# Patient Record
Sex: FEMALE | Race: WHITE | Employment: UNEMPLOYED | URBAN - METROPOLITAN AREA
[De-identification: names, ages, dates, MRNs, and addresses within clinical notes are randomized per-mention and may not be internally consistent; named-entity substitution may affect disease eponyms.]

---

## 2021-07-02 ENCOUNTER — TELEPHONE (OUTPATIENT)
Dept: ORTHOPEDIC SURGERY | Age: 61
End: 2021-07-02

## 2021-07-07 ENCOUNTER — OFFICE VISIT (OUTPATIENT)
Dept: ORTHOPEDIC SURGERY | Age: 61
End: 2021-07-07
Payer: MEDICAID

## 2021-07-07 DIAGNOSIS — M25.561 RIGHT KNEE PAIN, UNSPECIFIED CHRONICITY: Primary | ICD-10-CM

## 2021-07-07 DIAGNOSIS — M25.562 LEFT KNEE PAIN, UNSPECIFIED CHRONICITY: ICD-10-CM

## 2021-07-07 PROCEDURE — G8428 CUR MEDS NOT DOCUMENT: HCPCS | Performed by: ORTHOPAEDIC SURGERY

## 2021-07-07 PROCEDURE — 99203 OFFICE O/P NEW LOW 30 MIN: CPT | Performed by: ORTHOPAEDIC SURGERY

## 2021-07-07 PROCEDURE — G8421 BMI NOT CALCULATED: HCPCS | Performed by: ORTHOPAEDIC SURGERY

## 2021-07-07 PROCEDURE — 3017F COLORECTAL CA SCREEN DOC REV: CPT | Performed by: ORTHOPAEDIC SURGERY

## 2021-07-07 PROCEDURE — 4004F PT TOBACCO SCREEN RCVD TLK: CPT | Performed by: ORTHOPAEDIC SURGERY

## 2021-07-07 NOTE — PROGRESS NOTES
Tamara Jesus M.D.            [address] [place] [address]., [facility], [id number] [place] [address]           Dept Phone: [phone]           Dept Fax:  [phone]           Judy Haile          Dept Phone: [phone]           Dept Fax:  [phone]      Chief Compliant:  Chief Complaint   Patient presents with    Pain     BILATERAL KNEES        History of Present Illness: This is a 64 y.o. female who presents to the clinic today for evaluation / follow up of bilateral knee pain which is severe. Patient is a 49-year-old female who is been seen for knee pain many years ago at outlying facility. She resides in [place]. She has had injections in the past and really not helped out she states that she is tired of living this way. She can barely ambulate. 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 .       Review of Systems   Constitutional: Negative for fever, chills, sweats. Eyes: Negative for changes in vision, or pain. HENT: Negative for ear ache, epistaxis, or sore throat. Respiratory/Cardio: Negative for Chest pain, palpitations, SOB, or cough. Gastrointestinal: Negative for abdominal pain, N/V/D. Genitourinary: Negative for dysuria, frequency, urgency, or hematuria. Neurological: Negative for headache, numbness, or weakness. Integumentary: Negative for rash, itching, laceration, or abrasion. Musculoskeletal: Positive for Pain (BILATERAL KNEES)       Physical Exam:  Constitutional: Patient is oriented to person, place, and time. Patient appears well-developed and well nourished. HENT: Negative otherwise noted  Head: Normocephalic and Atraumatic  Nose: Normal  Eyes: Conjunctivae and EOM are normal  Neck: Normal range of motion Neck supple. Respiratory/Cardio: Effort normal. No respiratory distress.   Musculoskeletal: require preoperative clearance per her primary care physician    Patient was also made aware that bilaterals are typically in a longer recovery time but she wishes to get this all done 1 time she may require rehab facility post discharge and she is aware of this as well. She wishes to schedule for bilateral knee replacements pending medical clearance  Provider Attestation:  I, Sharon Kennedy, personally performed the services described in this documentation. All medical record entries made by the scribe were at my direction and in my presence. I have reviewed the chart and discharge instructions and agree that the records reflect my personal performance and is accurate and complete. Sharon Kennedy MD. 07/07/21      Please note that this chart was generated using voice recognition Dragon dictation software. Although every effort was made to ensure the accuracy of this automated transcription, some errors in transcription may have occurred.

## 2021-08-10 ENCOUNTER — TELEPHONE (OUTPATIENT)
Dept: ORTHOPEDIC SURGERY | Age: 61
End: 2021-08-10

## 2021-08-10 DIAGNOSIS — M25.561 ACUTE PAIN OF BOTH KNEES: Primary | ICD-10-CM

## 2021-08-10 DIAGNOSIS — M25.562 ACUTE PAIN OF BOTH KNEES: Primary | ICD-10-CM

## 2021-08-10 RX ORDER — LISINOPRIL 20 MG/1
20 TABLET ORAL DAILY
COMMUNITY

## 2021-08-10 NOTE — TELEPHONE ENCOUNTER
Called patient and left voice message for her to check with her insurance company to see if this a covered benefit or not and what DME location is approved for her. Are you willing to give patient an order for a ramp to go over her 4 steps for after surgery?

## 2021-08-11 ENCOUNTER — HOSPITAL ENCOUNTER (OUTPATIENT)
Dept: PREADMISSION TESTING | Age: 61
Discharge: HOME | End: 2021-08-15
Payer: MEDICAID

## 2021-08-11 VITALS
DIASTOLIC BLOOD PRESSURE: 112 MMHG | WEIGHT: 245 LBS | RESPIRATION RATE: 16 BRPM | SYSTOLIC BLOOD PRESSURE: 194 MMHG | HEART RATE: 66 BPM | TEMPERATURE: 97.6 F | BODY MASS INDEX: 45.08 KG/M2 | OXYGEN SATURATION: 97 % | HEIGHT: 62 IN

## 2021-08-11 LAB
ABSOLUTE EOS #: 0.1 K/UL (ref 0–0.4)
ABSOLUTE IMMATURE GRANULOCYTE: ABNORMAL K/UL (ref 0–0.3)
ABSOLUTE LYMPH #: 2.7 K/UL (ref 1–4.8)
ABSOLUTE MONO #: 0.6 K/UL (ref 0.1–1.3)
ANION GAP SERPL CALCULATED.3IONS-SCNC: 12 MMOL/L (ref 9–17)
BASOPHILS # BLD: 1 % (ref 0–2)
BASOPHILS ABSOLUTE: 0.1 K/UL (ref 0–0.2)
BILIRUBIN URINE: NEGATIVE
BUN BLDV-MCNC: 7 MG/DL (ref 8–23)
BUN/CREAT BLD: ABNORMAL (ref 9–20)
CALCIUM SERPL-MCNC: 9.1 MG/DL (ref 8.6–10.4)
CHLORIDE BLD-SCNC: 105 MMOL/L (ref 98–107)
CO2: 23 MMOL/L (ref 20–31)
COLOR: YELLOW
COMMENT UA: NORMAL
CREAT SERPL-MCNC: 0.62 MG/DL (ref 0.5–0.9)
DIFFERENTIAL TYPE: ABNORMAL
EOSINOPHILS RELATIVE PERCENT: 2 % (ref 0–4)
GFR AFRICAN AMERICAN: >60 ML/MIN
GFR NON-AFRICAN AMERICAN: >60 ML/MIN
GFR SERPL CREATININE-BSD FRML MDRD: ABNORMAL ML/MIN/{1.73_M2}
GFR SERPL CREATININE-BSD FRML MDRD: ABNORMAL ML/MIN/{1.73_M2}
GLUCOSE BLD-MCNC: 93 MG/DL (ref 70–99)
GLUCOSE URINE: NEGATIVE
HCT VFR BLD CALC: 50.9 % (ref 36–46)
HEMOGLOBIN: 17.1 G/DL (ref 12–16)
IMMATURE GRANULOCYTES: ABNORMAL %
KETONES, URINE: NEGATIVE
LEUKOCYTE ESTERASE, URINE: NEGATIVE
LYMPHOCYTES # BLD: 30 % (ref 24–44)
MCH RBC QN AUTO: 30.3 PG (ref 26–34)
MCHC RBC AUTO-ENTMCNC: 33.6 G/DL (ref 31–37)
MCV RBC AUTO: 90.2 FL (ref 80–100)
MONOCYTES # BLD: 7 % (ref 1–7)
NITRITE, URINE: NEGATIVE
NRBC AUTOMATED: ABNORMAL PER 100 WBC
PDW BLD-RTO: 13.7 % (ref 11.5–14.9)
PH UA: 6.5 (ref 5–8)
PLATELET # BLD: 252 K/UL (ref 150–450)
PLATELET ESTIMATE: ABNORMAL
PMV BLD AUTO: 7.9 FL (ref 6–12)
POTASSIUM SERPL-SCNC: 4.2 MMOL/L (ref 3.7–5.3)
PROTEIN UA: NEGATIVE
RBC # BLD: 5.65 M/UL (ref 4–5.2)
RBC # BLD: ABNORMAL 10*6/UL
SEG NEUTROPHILS: 60 % (ref 36–66)
SEGMENTED NEUTROPHILS ABSOLUTE COUNT: 5.4 K/UL (ref 1.3–9.1)
SODIUM BLD-SCNC: 140 MMOL/L (ref 135–144)
SPECIFIC GRAVITY UA: 1.01 (ref 1–1.03)
TURBIDITY: CLEAR
URINE HGB: NEGATIVE
UROBILINOGEN, URINE: NORMAL
WBC # BLD: 8.9 K/UL (ref 3.5–11)
WBC # BLD: ABNORMAL 10*3/UL

## 2021-08-11 PROCEDURE — 81003 URINALYSIS AUTO W/O SCOPE: CPT

## 2021-08-11 PROCEDURE — 36415 COLL VENOUS BLD VENIPUNCTURE: CPT

## 2021-08-11 PROCEDURE — 85025 COMPLETE CBC W/AUTO DIFF WBC: CPT

## 2021-08-11 PROCEDURE — 87641 MR-STAPH DNA AMP PROBE: CPT

## 2021-08-11 PROCEDURE — 80048 BASIC METABOLIC PNL TOTAL CA: CPT

## 2021-08-11 PROCEDURE — 93005 ELECTROCARDIOGRAM TRACING: CPT | Performed by: ANESTHESIOLOGY

## 2021-08-11 RX ORDER — SENNOSIDES 8.6 MG
650 CAPSULE ORAL EVERY 8 HOURS PRN
Status: ON HOLD | COMMUNITY
End: 2021-09-21

## 2021-08-11 ASSESSMENT — PAIN DESCRIPTION - ORIENTATION: ORIENTATION: RIGHT;LEFT

## 2021-08-11 ASSESSMENT — PAIN DESCRIPTION - LOCATION: LOCATION: KNEE

## 2021-08-11 ASSESSMENT — PAIN DESCRIPTION - DESCRIPTORS: DESCRIPTORS: ACHING

## 2021-08-11 ASSESSMENT — PAIN DESCRIPTION - PAIN TYPE: TYPE: CHRONIC PAIN

## 2021-08-11 ASSESSMENT — PAIN SCALES - GENERAL: PAINLEVEL_OUTOF10: 3

## 2021-08-11 NOTE — PROGRESS NOTES
Dr. Nguyễn Rojo, anesthesia, was contacted and informed of the patient's planned surgery, history, and unconfirmed abnormal EKG results from EKG done today in [facility]. Medical clearance required. Surgery scheduling will notify Dr. Lj Deng office who will be responsible for making sure the clearance is obtained and is in the chart for surgery. Blood pressure today 170/100 manually 194/112 with machine. Patient was instructed to call PCP and tell her BP's from today and encouraged to check BP at home and keep record for her PCP.

## 2021-08-11 NOTE — H&P
vision. Patient has hx of PONV. Patient had Labs and EKG done with sinus bradycardia  And possible venticular hypertrophy. She is not on any anticoagulants. She will require preoperative clearance per her primary care physician    XR KNEE LEFT (1-2 VIEWS) 7/7/2021  X-rays taken today reviewed by me show standing AP and lateral left knee.    Patient has severe varus gonarthrosis left knee with bone-on-bone   apposition and early tibial wear with humongous osteophytes medially and   laterally.  Patient also has severe patellofemoral arthrosis as well     XR KNEE RIGHT (1-2 VIEWS) 7/7/2021  X-rays taken today reviewed by me show standing AP of the right knee as   well as a lateral.  Patient has significant varus gonarthrosis with   approaching bone-on-bone disease medial compartment of the right knee with   acute spur formation both medially and laterally given overall varus   disposition.  Patient also has a a severe patellofemoral arthrosis as   Well. BP Readings from Last 3 Encounters:   08/11/21 (S) (!) 194/112     PAST MEDICAL HISTORY     Past Medical History:   Diagnosis Date    Anxiety     Arthritis     Depression     Hypertension     Knee pain     x10 years    PONV (postoperative nausea and vomiting)     Sleep apnea     no machine       SURGICAL HISTORY       Past Surgical History:   Procedure Laterality Date    EYE SURGERY Left 2018    had a tear in left eye- had laser surgery to repair    ROTATOR CUFF REPAIR Left     SKIN BIOPSY      negative    TUMOR REMOVAL      left foot    ULNAR TUNNEL RELEASE Right     pinched nerve- has a plate and 7 screws        FAMILY HISTORY     History reviewed. No pertinent family history.     SOCIAL HISTORY       Social History     Socioeconomic History    Marital status:      Spouse name: None    Number of children: None    Years of education: None    Highest education level: None   Occupational History    None   Tobacco Use    Smoking status: Current Every Day Smoker     Packs/day: 1.00    Smokeless tobacco: Never Used   Vaping Use    Vaping Use: Some days    Substances: Nicotine, Flavoring   Substance and Sexual Activity    Alcohol use: Not Currently    Drug use: Not Currently    Sexual activity: None   Other Topics Concern    None   Social History Narrative    None     Social Determinants of Health     Financial Resource Strain:     Difficulty of Paying Living Expenses:    Food Insecurity:     Worried About Running Out of Food in the Last Year:     Ran Out of Food in the Last Year:    Transportation Needs:     Lack of Transportation (Medical):  Lack of Transportation (Non-Medical):    Physical Activity:     Days of Exercise per Week:     Minutes of Exercise per Session:    Stress:     Feeling of Stress :    Social Connections:     Frequency of Communication with Friends and Family:     Frequency of Social Gatherings with Friends and Family:     Attends Adventist Services:     Active Member of Clubs or Organizations:     Attends Club or Organization Meetings:     Marital Status:    Intimate Partner Violence:     Fear of Current or Ex-Partner:     Emotionally Abused:     Physically Abused:     Sexually Abused:        REVIEW OF SYSTEMS      No Known Allergies    Current Outpatient Medications on File Prior to Encounter   Medication Sig Dispense Refill    acetaminophen (TYLENOL 8 HOUR ARTHRITIS PAIN) 650 MG extended release tablet Take 650 mg by mouth every 8 hours as needed for Pain      lisinopril (PRINIVIL;ZESTRIL) 20 MG tablet Take 20 mg by mouth daily        No current facility-administered medications on file prior to encounter. General health:  Fairly good. No fever or chills. Skin:  No itching, redness or rash. HEENT:  No headache, epistaxis or sore throat. Neck:  No pain, stiffness or masses.                  Cardiovascular/Respiratory system:  No chest pain, palpitation or shortness of breath. Gastrointestinal tract: No abdominal pain, Dysphagia, nausea, vomiting, diarrhea or constipation. Genitourinary:  No burning on micturition. No hesitancy, urgency, frequency or discoloration of urine. Locomotor:  See HPI. Neuropsychiatric:  No referable complaints. GENERAL PHYSICAL EXAM:     Vitals: BP (S) (!) 194/112 Comment: 170/100 manual  Pulse 66   Temp 97.6 °F (36.4 °C) (Infrared)   Resp 16   Ht 5' 2\" (1.575 m)   Wt 245 lb (111.1 kg)   SpO2 97%   BMI 44.81 kg/m²  Body mass index is 44.81 kg/m². GENERAL APPEARANCE:   Kurtis Meza is 64 y.o.,  female, severely obese, nourished, conscious, alert. Does not appear to be distress or pain at this time. SKIN:  Warm, dry, no cyanosis or jaundice. HEAD:  Normocephalic, atraumatic, no swelling or tenderness. EYES:  Pupils equal, reactive to light. EARS:  No discharge, no marked hearing loss. NOSE:  No rhinorrhea, epistaxis or septal deformity. THROAT:  Not congested. No ulceration bleeding or discharge. NECK:  No stiffness, trachea central.                  CHEST:  Symmetrical and equal on expansion. HEART:  RRR S1 > S2. No audible murmurs or gallops. LUNGS:  Equal on expansion, normal breath sounds. No adventitious sounds. ABDOMEN:  Severely obese. Soft on palpation. No localized tenderness. No guarding or rigidity. LYMPHATICS:  No palpable cervical lymphadenopathy. LOCOMOTOR, BACK AND SPINE:  No tenderness or deformities. EXTREMITIES:  Symmetrical,  mild pretibial edema. No calf tenderness. No discoloration or ulcerations. Tenderness on palpation of Tj Knee  Knee joint space. TJ legs weak with SLRs.      NEUROLOGIC:  The patient is conscious, alert, oriented,Cranial nerve II-XII intact, taste and smell were not examined. No apparent focal sensory or motor deficits. PROVISIONAL DIAGNOSES / SURGERY:      KNEE TOTAL ARTHROPLASTY BILATERAL    RIGHT AND LEFT KNEE PAIN       There are no problems to display for this patient.           FERNANDO PADGETT, CATHY - CNP on 8/11/2021 at 1:13 PM

## 2021-08-12 LAB
EKG ATRIAL RATE: 59 BPM
EKG P AXIS: 63 DEGREES
EKG P-R INTERVAL: 142 MS
EKG Q-T INTERVAL: 444 MS
EKG QRS DURATION: 98 MS
EKG QTC CALCULATION (BAZETT): 439 MS
EKG R AXIS: -10 DEGREES
EKG T AXIS: 30 DEGREES
EKG VENTRICULAR RATE: 59 BPM
MRSA, DNA, NASAL: NORMAL
SPECIMEN DESCRIPTION: NORMAL

## 2021-08-12 PROCEDURE — 93010 ELECTROCARDIOGRAM REPORT: CPT | Performed by: INTERNAL MEDICINE

## 2021-08-13 NOTE — TELEPHONE ENCOUNTER
Placed order and printed for patient to . I called patient and left voice message for her that the order is here for  or she can call with fax number and we can fax it.

## 2021-08-16 ENCOUNTER — TELEPHONE (OUTPATIENT)
Dept: ORTHOPEDIC SURGERY | Age: 61
End: 2021-08-16

## 2021-08-16 NOTE — TELEPHONE ENCOUNTER
The office of Rosemarie Desai, PT's PCP is calling, saying that PT can not be cleared for Surgery due to Elevated BP (180/100). PT is scheduled for an Echo Thursday of this week.

## 2021-09-14 ENCOUNTER — HOSPITAL ENCOUNTER (OUTPATIENT)
Dept: PREADMISSION TESTING | Age: 61
Discharge: HOME | End: 2021-09-18
Payer: MEDICAID

## 2021-09-14 VITALS
TEMPERATURE: 97.8 F | BODY MASS INDEX: 44.16 KG/M2 | OXYGEN SATURATION: 97 % | DIASTOLIC BLOOD PRESSURE: 84 MMHG | RESPIRATION RATE: 16 BRPM | HEART RATE: 70 BPM | WEIGHT: 240 LBS | SYSTOLIC BLOOD PRESSURE: 140 MMHG | HEIGHT: 62 IN

## 2021-09-14 LAB
ABSOLUTE EOS #: 0.1 K/UL (ref 0–0.4)
ABSOLUTE IMMATURE GRANULOCYTE: ABNORMAL K/UL (ref 0–0.3)
ABSOLUTE LYMPH #: 2.7 K/UL (ref 1–4.8)
ABSOLUTE MONO #: 0.6 K/UL (ref 0.1–1.3)
ANION GAP SERPL CALCULATED.3IONS-SCNC: 11 MMOL/L (ref 9–17)
BASOPHILS # BLD: 1 % (ref 0–2)
BASOPHILS ABSOLUTE: 0.1 K/UL (ref 0–0.2)
BILIRUBIN URINE: NEGATIVE
BUN BLDV-MCNC: 12 MG/DL (ref 8–23)
BUN/CREAT BLD: NORMAL (ref 9–20)
CALCIUM SERPL-MCNC: 9.5 MG/DL (ref 8.6–10.4)
CHLORIDE BLD-SCNC: 101 MMOL/L (ref 98–107)
CO2: 24 MMOL/L (ref 20–31)
COLOR: YELLOW
COMMENT UA: NORMAL
CREAT SERPL-MCNC: 0.57 MG/DL (ref 0.5–0.9)
DIFFERENTIAL TYPE: ABNORMAL
EOSINOPHILS RELATIVE PERCENT: 1 % (ref 0–4)
GFR AFRICAN AMERICAN: >60 ML/MIN
GFR NON-AFRICAN AMERICAN: >60 ML/MIN
GFR SERPL CREATININE-BSD FRML MDRD: NORMAL ML/MIN/{1.73_M2}
GFR SERPL CREATININE-BSD FRML MDRD: NORMAL ML/MIN/{1.73_M2}
GLUCOSE BLD-MCNC: 91 MG/DL (ref 70–99)
GLUCOSE URINE: NEGATIVE
HCT VFR BLD CALC: 51.2 % (ref 36–46)
HEMOGLOBIN: 17.1 G/DL (ref 12–16)
IMMATURE GRANULOCYTES: ABNORMAL %
KETONES, URINE: NEGATIVE
LEUKOCYTE ESTERASE, URINE: NEGATIVE
LYMPHOCYTES # BLD: 33 % (ref 24–44)
MCH RBC QN AUTO: 29.8 PG (ref 26–34)
MCHC RBC AUTO-ENTMCNC: 33.4 G/DL (ref 31–37)
MCV RBC AUTO: 89.3 FL (ref 80–100)
MONOCYTES # BLD: 7 % (ref 1–7)
NITRITE, URINE: NEGATIVE
NRBC AUTOMATED: ABNORMAL PER 100 WBC
PDW BLD-RTO: 13.5 % (ref 11.5–14.9)
PH UA: 6 (ref 5–8)
PLATELET # BLD: 278 K/UL (ref 150–450)
PLATELET ESTIMATE: ABNORMAL
PMV BLD AUTO: 8.1 FL (ref 6–12)
POTASSIUM SERPL-SCNC: 4.3 MMOL/L (ref 3.7–5.3)
PROTEIN UA: NEGATIVE
RBC # BLD: 5.73 M/UL (ref 4–5.2)
RBC # BLD: ABNORMAL 10*6/UL
SEG NEUTROPHILS: 58 % (ref 36–66)
SEGMENTED NEUTROPHILS ABSOLUTE COUNT: 4.8 K/UL (ref 1.3–9.1)
SODIUM BLD-SCNC: 136 MMOL/L (ref 135–144)
SPECIFIC GRAVITY UA: 1.02 (ref 1–1.03)
TURBIDITY: CLEAR
URINE HGB: NEGATIVE
UROBILINOGEN, URINE: NORMAL
WBC # BLD: 8.3 K/UL (ref 3.5–11)
WBC # BLD: ABNORMAL 10*3/UL

## 2021-09-14 PROCEDURE — 85025 COMPLETE CBC W/AUTO DIFF WBC: CPT

## 2021-09-14 PROCEDURE — 87641 MR-STAPH DNA AMP PROBE: CPT

## 2021-09-14 PROCEDURE — 36415 COLL VENOUS BLD VENIPUNCTURE: CPT

## 2021-09-14 PROCEDURE — 80048 BASIC METABOLIC PNL TOTAL CA: CPT

## 2021-09-14 PROCEDURE — 81003 URINALYSIS AUTO W/O SCOPE: CPT

## 2021-09-14 RX ORDER — AMLODIPINE BESYLATE 10 MG/1
10 TABLET ORAL DAILY
COMMUNITY

## 2021-09-14 ASSESSMENT — ENCOUNTER SYMPTOMS
RESPIRATORY NEGATIVE: 1
GASTROINTESTINAL NEGATIVE: 1

## 2021-09-14 NOTE — H&P
HISTORY and [facility] [address]       NAME:  Lata Haas  MRN: [id number]   YOB: 1960   Date: 9/14/2021   Age: 64 y.o. Gender: female     COMPLAINT AND PRESENT HISTORY:   Lata Haas is 64 y.o.,  female, presents for pre-anesthesia/admission testing for KNEE TOTAL ARTHROPLASTY BILATERAL Bilateral per Dr. Fidelina Smith       Primary dx: DEGENERATIVE JOINT DISEASE BILATERAL KNEES    HPI:  Patient C/O of dual/ aching  Pain with swelling, stiffness, popping and cracking  bilateral Knee. Pt describes the pain as 10 /10 in intensity at times. Symptoms started  greater than 10 years ago. The knee does buckle, and give way under her, No recent falls or trauma. No redness or rashes. 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 Pain aggravated by  climbing steps,  walking /standing for even short time. pain decreased by taking  Tylenol/ Motrin for pain relief. Pt states resting for long time results in stiffen then causing more pain getting up. Pt had cortisone injection once without any pain relief.    Pt denies fever/chills, chest pain or SOB, no palpitation or headache     Testing completed r/t condition:  XR KNEE LEFT (1-2 VIEWS)  Narrative   X-rays taken today reviewed by me show standing AP and lateral left knee.     Patient has severe varus gonarthrosis left knee with bone-on-bone    apposition and early tibial wear with humongous osteophytes medially and    laterally.  Patient also has severe patellofemoral arthrosis as well     XR KNEE RIGHT (1-2 VIEWS)  Narrative   X-rays taken today reviewed by me show standing AP of the right knee as    well as a lateral.  Patient has significant varus gonarthrosis with    approaching bone-on-bone disease medial compartment of the right knee with    acute spur formation both medially and laterally given overall varus    disposition.  Patient also has a a severe patellofemoral arthrosis as well.       Review of additional significant medical hx:  HTN, sleep apnea , no machine   Medication r/t condition   Norvasc 10 mg tablet, lisinopril 20 mg tablet     BP Readings from Last 3 Encounters:   09/14/21 (!) 140/84   08/11/21 (S) (!) 194/112     Pulse Readings from Last 3 Encounters:   09/14/21 70   08/11/21 66     ECG on 8/11/2021  Narrative & Impression    Sinus bradycardia  Possible Left atrial enlargement  Left ventricular hypertrophy  Abnormal ECG  No previous ECGs available        Echo complete W/O contrast 8/19/2021  Narrative      Left Ventricle: Systolic function is normal with an ejection fraction   of 55-60%.   Aortic Valve: There is no regurgitation. There is mild stenosis. The   calculated aortic valve area is 1.54 cm2. The calculated aortic valve peak   gradient is 18.00 mmHg. The calculated aortic valve mean gradient is 9.00   mmHg.   Right Ventricle: Right ventricular size appears normal. Systolic   function is normal.     Tricuspid Valve: The right ventricular systolic pressure normal. RVSP   calculated at 20 mmHg. RVSP is based on RA pressure of 3 mmHg. Denies hx of MRSA infection. Denies hx of blood clots. Pt has postoperative nausea and vomiting     Clearance requested per anesthesiologist:   Medical clearance requested. Surgery was cancelled Aug 2021 due to high blood pressure. Obtained echo results from 8/2021, placed in chart. Surgery scheduling will notify Dr. Wilfredo Matute office who will be responsible for making sure the clearance is obtained and is in the chart for surgery.   PAST MEDICAL HISTORY     Past Medical History:   Diagnosis Date    Anxiety     Arthritis     Depression     Hypertension     Knee pain     x10 years    Murmur     PONV (postoperative nausea and vomiting)     Sleep apnea     no machine       SURGICAL HISTORY       Past Surgical History:   Procedure Laterality Date    EYE SURGERY Left 2018    had a tear in left eye- had laser surgery to repair    ROTATOR CUFF REPAIR Left     SKIN BIOPSY      negative    TUMOR REMOVAL      left foot    ULNAR TUNNEL RELEASE Right     pinched nerve- has a plate and 7 screws        SOCIAL HISTORY       Social History     Socioeconomic History    Marital status:      Spouse name: None    Number of children: None    Years of education: None    Highest education level: None   Occupational History    None   Tobacco Use    Smoking status: Current Every Day Smoker     Packs/day: 1.00    Smokeless tobacco: Never Used   Vaping Use    Vaping Use: Some days    Substances: Nicotine, Flavoring   Substance and Sexual Activity    Alcohol use: Not Currently    Drug use: Not Currently    Sexual activity: None   Other Topics Concern    None   Social History Narrative    None     Social Determinants of Health     Financial Resource Strain:     Difficulty of Paying Living Expenses:    Food Insecurity:     Worried About Running Out of Food in the Last Year:     Ran Out of Food in the Last Year:    Transportation Needs:     Lack of Transportation (Medical):      Lack of Transportation (Non-Medical):    Physical Activity:     Days of Exercise per Week:     Minutes of Exercise per Session:    Stress:     Feeling of Stress :    Social Connections:     Frequency of Communication with Friends and Family:     Frequency of Social Gatherings with Friends and Family:     Attends Advent Services:     Active Member of Clubs or Organizations:     Attends Club or Organization Meetings:     Marital Status:    Intimate Partner Violence:     Fear of Current or Ex-Partner:     Emotionally Abused:     Physically Abused:     Sexually Abused:        REVIEW OF SYSTEMS    No Known Allergies    Current Outpatient Medications on File Prior to Encounter   Medication Sig Dispense Refill    amLODIPine (NORVASC) 10 MG tablet Take 10 mg by mouth daily      acetaminophen (TYLENOL 8 HOUR ARTHRITIS PAIN) 650 MG extended release tablet Take 650 mg by mouth every 8 hours as needed for Pain      lisinopril (PRINIVIL;ZESTRIL) 20 MG tablet Take 20 mg by mouth daily        No current facility-administered medications on file prior to encounter. Review of Systems   Constitutional: Positive for activity change and fatigue. HENT: Negative. Respiratory: Negative. Gastrointestinal: Negative. Genitourinary: Negative. Musculoskeletal:        Bilateral knee pain    Skin: Negative. Neurological: Negative. Hematological: Negative. Psychiatric/Behavioral: Negative. GENERAL PHYSICAL EXAM     Vitals: BP (!) 140/84   Pulse 70   Temp 97.8 °F (36.6 °C)   Resp 16   Ht 5' 2\" (1.575 m)   Wt 240 lb (108.9 kg)   SpO2 97%   BMI 43.90 kg/m²               Physical Exam  Constitutional:       Appearance: Normal appearance. HENT:      Head: Normocephalic. Right Ear: External ear normal.      Left Ear: External ear normal.      Nose: Nose normal.      Mouth/Throat:      Mouth: Mucous membranes are moist.   Eyes:      General:         Right eye: No discharge. Left eye: No discharge. Pupils: Pupils are equal, round, and reactive to light. Cardiovascular:      Rate and Rhythm: Normal rate and regular rhythm. Pulses: Normal pulses. Radial pulses are 2+ on the right side and 2+ on the left side. Dorsalis pedis pulses are 2+ on the right side and 2+ on the left side. Posterior tibial pulses are 2+ on the right side and 2+ on the left side. Heart sounds: Normal heart sounds. No murmur heard. No gallop. Pulmonary:      Effort: Pulmonary effort is normal.      Breath sounds: Normal breath sounds. No wheezing or rhonchi. Abdominal:      General: Bowel sounds are normal. There is no distension. Palpations: Abdomen is soft. Tenderness: There is no abdominal tenderness. There is no guarding.    Musculoskeletal:      Cervical back: Normal range of motion and neck supple. Right lower leg: No edema. Left lower leg: No edema. Comments: Bilateral knee limitate ROM  Tenderness on palpation of Tj Knee  Knee joint space. TJ legs weak with SLRs. Skin:     General: Skin is warm and dry. Findings: No bruising or erythema. Neurological:      General: No focal deficit present. Mental Status: She is alert and oriented to person, place, and time. Psychiatric:         Mood and Affect: Mood normal.         Behavior: Behavior normal.         LAB REVIEW     Lab Results   Component Value Date    WBC 8.9 08/11/2021    HGB 17.1 (H) 08/11/2021    HCT 50.9 (H) 08/11/2021    MCV 90.2 08/11/2021     08/11/2021     Lab Results   Component Value Date     08/11/2021    K 4.2 08/11/2021     08/11/2021    CO2 23 08/11/2021    BUN 7 08/11/2021    CREATININE 0.62 08/11/2021    GLUCOSE 93 08/11/2021    CALCIUM 9.1 08/11/2021          SURGERY / PROVISIONAL DIAGNOSES:      DEGENERATIVE JOINT DISEASE BILATERAL KNEES  KNEE TOTAL ARTHROPLASTY BILATERAL         There are no problems to display for this patient.               CATHY Bucio - CNP on 9/14/2021 at 12:19 PM

## 2021-09-14 NOTE — H&P (VIEW-ONLY)
HISTORY and [facility]       NAME:  Hannah Steele  MRN: [id number]   YOB: 1960   Date: 9/14/2021   Age: 64 y.o. Gender: female     COMPLAINT AND PRESENT HISTORY:   Hannah Steele is 64 y.o.,  female, presents for pre-anesthesia/admission testing for KNEE TOTAL ARTHROPLASTY BILATERAL Bilateral per Dr. Abel Weston       Primary dx: DEGENERATIVE JOINT DISEASE BILATERAL KNEES    HPI:  Patient C/O of dual/ aching  Pain with swelling, stiffness, popping and cracking  bilateral Knee. Pt describes the pain as 10 /10 in intensity at times. Symptoms started  greater than 10 years ago. The knee does buckle, and give way under her, No recent falls or trauma. No redness or rashes. 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 Pain aggravated by  climbing steps,  walking /standing for even short time. pain decreased by taking  Tylenol/ Motrin for pain relief. Pt states resting for long time results in stiffen then causing more pain getting up. Pt had cortisone injection once without any pain relief.    Pt denies fever/chills, chest pain or SOB, no palpitation or headache     Testing completed r/t condition:  XR KNEE LEFT (1-2 VIEWS)  Narrative   X-rays taken today reviewed by me show standing AP and lateral left knee.     Patient has severe varus gonarthrosis left knee with bone-on-bone    apposition and early tibial wear with humongous osteophytes medially and    laterally.  Patient also has severe patellofemoral arthrosis as well     XR KNEE RIGHT (1-2 VIEWS)  Narrative   X-rays taken today reviewed by me show standing AP of the right knee as    well as a lateral.  Patient has significant varus gonarthrosis with    approaching bone-on-bone disease medial compartment of the right knee with    acute spur formation both medially and laterally given overall varus    disposition.  Patient also has a a severe patellofemoral arthrosis as well.       Review of additional significant medical hx:  HTN, sleep apnea , no machine   Medication r/t condition   Norvasc 10 mg tablet, lisinopril 20 mg tablet     BP Readings from Last 3 Encounters:   09/14/21 (!) 140/84   08/11/21 (S) (!) 194/112     Pulse Readings from Last 3 Encounters:   09/14/21 70   08/11/21 66     ECG on 8/11/2021  Narrative & Impression    Sinus bradycardia  Possible Left atrial enlargement  Left ventricular hypertrophy  Abnormal ECG  No previous ECGs available        Echo complete W/O contrast 8/19/2021  Narrative      Left Ventricle: Systolic function is normal with an ejection fraction   of 55-60%.   Aortic Valve: There is no regurgitation. There is mild stenosis. The   calculated aortic valve area is 1.54 cm2. The calculated aortic valve peak   gradient is 18.00 mmHg. The calculated aortic valve mean gradient is 9.00   mmHg.   Right Ventricle: Right ventricular size appears normal. Systolic   function is normal.     Tricuspid Valve: The right ventricular systolic pressure normal. RVSP   calculated at 20 mmHg. RVSP is based on RA pressure of 3 mmHg. Denies hx of MRSA infection. Denies hx of blood clots. Pt has postoperative nausea and vomiting     Clearance requested per anesthesiologist:   Medical clearance requested. Surgery was cancelled Aug 2021 due to high blood pressure. Obtained echo results from 8/2021, placed in chart. Surgery scheduling will notify Dr. Maloney Snare office who will be responsible for making sure the clearance is obtained and is in the chart for surgery.   PAST MEDICAL HISTORY     Past Medical History:   Diagnosis Date    Anxiety     Arthritis     Depression     Hypertension     Knee pain     x10 years    Murmur     PONV (postoperative nausea and vomiting)     Sleep apnea     no machine       SURGICAL HISTORY       Past Surgical History:   Procedure Laterality Date    EYE SURGERY Left 2018    had a tear in left eye- had laser surgery to repair    ROTATOR CUFF REPAIR Left     SKIN BIOPSY      negative    TUMOR REMOVAL      left foot    ULNAR TUNNEL RELEASE Right     pinched nerve- has a plate and 7 screws        SOCIAL HISTORY       Social History     Socioeconomic History    Marital status:      Spouse name: None    Number of children: None    Years of education: None    Highest education level: None   Occupational History    None   Tobacco Use    Smoking status: Current Every Day Smoker     Packs/day: 1.00    Smokeless tobacco: Never Used   Vaping Use    Vaping Use: Some days    Substances: Nicotine, Flavoring   Substance and Sexual Activity    Alcohol use: Not Currently    Drug use: Not Currently    Sexual activity: None   Other Topics Concern    None   Social History Narrative    None     Social Determinants of Health     Financial Resource Strain:     Difficulty of Paying Living Expenses:    Food Insecurity:     Worried About Running Out of Food in the Last Year:     Ran Out of Food in the Last Year:    Transportation Needs:     Lack of Transportation (Medical):      Lack of Transportation (Non-Medical):    Physical Activity:     Days of Exercise per Week:     Minutes of Exercise per Session:    Stress:     Feeling of Stress :    Social Connections:     Frequency of Communication with Friends and Family:     Frequency of Social Gatherings with Friends and Family:     Attends Worship Services:     Active Member of Clubs or Organizations:     Attends Club or Organization Meetings:     Marital Status:    Intimate Partner Violence:     Fear of Current or Ex-Partner:     Emotionally Abused:     Physically Abused:     Sexually Abused:        REVIEW OF SYSTEMS    No Known Allergies    Current Outpatient Medications on File Prior to Encounter   Medication Sig Dispense Refill    amLODIPine (NORVASC) 10 MG tablet Take 10 mg by mouth daily      acetaminophen (TYLENOL 8 HOUR ARTHRITIS PAIN) 650 MG extended release tablet Take 650 mg by mouth every 8 hours as needed for Pain      lisinopril (PRINIVIL;ZESTRIL) 20 MG tablet Take 20 mg by mouth daily        No current facility-administered medications on file prior to encounter. Review of Systems   Constitutional: Positive for activity change and fatigue. HENT: Negative. Respiratory: Negative. Gastrointestinal: Negative. Genitourinary: Negative. Musculoskeletal:        Bilateral knee pain    Skin: Negative. Neurological: Negative. Hematological: Negative. Psychiatric/Behavioral: Negative. GENERAL PHYSICAL EXAM     Vitals: BP (!) 140/84   Pulse 70   Temp 97.8 °F (36.6 °C)   Resp 16   Ht 5' 2\" (1.575 m)   Wt 240 lb (108.9 kg)   SpO2 97%   BMI 43.90 kg/m²               Physical Exam  Constitutional:       Appearance: Normal appearance. HENT:      Head: Normocephalic. Right Ear: External ear normal.      Left Ear: External ear normal.      Nose: Nose normal.      Mouth/Throat:      Mouth: Mucous membranes are moist.   Eyes:      General:         Right eye: No discharge. Left eye: No discharge. Pupils: Pupils are equal, round, and reactive to light. Cardiovascular:      Rate and Rhythm: Normal rate and regular rhythm. Pulses: Normal pulses. Radial pulses are 2+ on the right side and 2+ on the left side. Dorsalis pedis pulses are 2+ on the right side and 2+ on the left side. Posterior tibial pulses are 2+ on the right side and 2+ on the left side. Heart sounds: Normal heart sounds. No murmur heard. No gallop. Pulmonary:      Effort: Pulmonary effort is normal.      Breath sounds: Normal breath sounds. No wheezing or rhonchi. Abdominal:      General: Bowel sounds are normal. There is no distension. Palpations: Abdomen is soft. Tenderness: There is no abdominal tenderness. There is no guarding.    Musculoskeletal:      Cervical back: Normal range of motion and neck supple. Right lower leg: No edema. Left lower leg: No edema. Comments: Bilateral knee limitate ROM  Tenderness on palpation of Tj Knee  Knee joint space. TJ legs weak with SLRs. Skin:     General: Skin is warm and dry. Findings: No bruising or erythema. Neurological:      General: No focal deficit present. Mental Status: She is alert and oriented to person, place, and time. Psychiatric:         Mood and Affect: Mood normal.         Behavior: Behavior normal.         LAB REVIEW     Lab Results   Component Value Date    WBC 8.9 08/11/2021    HGB 17.1 (H) 08/11/2021    HCT 50.9 (H) 08/11/2021    MCV 90.2 08/11/2021     08/11/2021     Lab Results   Component Value Date     08/11/2021    K 4.2 08/11/2021     08/11/2021    CO2 23 08/11/2021    BUN 7 08/11/2021    CREATININE 0.62 08/11/2021    GLUCOSE 93 08/11/2021    CALCIUM 9.1 08/11/2021          SURGERY / PROVISIONAL DIAGNOSES:      DEGENERATIVE JOINT DISEASE BILATERAL KNEES  KNEE TOTAL ARTHROPLASTY BILATERAL         There are no problems to display for this patient.               Cristofer Villanueva, CATHY - CNP on 9/14/2021 at 12:19 PM

## 2021-09-14 NOTE — PROGRESS NOTES
Medical clearance requested. Surgery was cancelled Aug 2021 due to high blood pressure. Obtained echo results from 8/2021, placed in chart. Surgery scheduling will notify Dr. Genevieve Cote office who will be responsible for making sure the clearance is obtained and is in the chart for surgery.

## 2021-09-15 LAB
MRSA, DNA, NASAL: NORMAL
SPECIMEN DESCRIPTION: NORMAL

## 2021-09-17 ENCOUNTER — HOSPITAL ENCOUNTER (OUTPATIENT)
Dept: LAB | Age: 61
Setting detail: SPECIMEN
Discharge: HOME | End: 2021-09-17
Payer: MEDICAID

## 2021-09-17 DIAGNOSIS — Z01.818 PRE-OP TESTING: Primary | ICD-10-CM

## 2021-09-17 PROCEDURE — U0003 INFECTIOUS AGENT DETECTION BY NUCLEIC ACID (DNA OR RNA); SEVERE ACUTE RESPIRATORY SYNDROME CORONAVIRUS 2 (SARS-COV-2) (CORONAVIRUS DISEASE [COVID-19]), AMPLIFIED PROBE TECHNIQUE, MAKING USE OF HIGH THROUGHPUT TECHNOLOGIES AS DESCRIBED BY CMS-2020-01-R: HCPCS

## 2021-09-17 PROCEDURE — U0005 INFEC AGEN DETEC AMPLI PROBE: HCPCS

## 2021-09-18 LAB
SARS-COV-2: NORMAL
SARS-COV-2: NOT DETECTED
SOURCE: NORMAL

## 2021-09-20 ENCOUNTER — ANESTHESIA EVENT (OUTPATIENT)
Dept: OPERATING ROOM | Age: 61
End: 2021-09-20
Payer: MEDICAID

## 2021-09-21 ENCOUNTER — ANESTHESIA (OUTPATIENT)
Dept: OPERATING ROOM | Age: 61
End: 2021-09-21
Payer: MEDICAID

## 2021-09-21 ENCOUNTER — APPOINTMENT (OUTPATIENT)
Dept: GENERAL RADIOLOGY | Age: 61
End: 2021-09-21
Attending: ORTHOPAEDIC SURGERY
Payer: MEDICAID

## 2021-09-21 ENCOUNTER — HOSPITAL ENCOUNTER (OUTPATIENT)
Age: 61
Setting detail: OBSERVATION
Discharge: NURSING, REHAB OR LONG-TERM CARE | End: 2021-09-24
Attending: ORTHOPAEDIC SURGERY | Admitting: ORTHOPAEDIC SURGERY
Payer: MEDICAID

## 2021-09-21 VITALS — SYSTOLIC BLOOD PRESSURE: 161 MMHG | DIASTOLIC BLOOD PRESSURE: 76 MMHG | OXYGEN SATURATION: 94 % | TEMPERATURE: 97.9 F

## 2021-09-21 DIAGNOSIS — M17.0 PRIMARY OSTEOARTHRITIS OF BOTH KNEES: Primary | ICD-10-CM

## 2021-09-21 LAB
ALLEN TEST: ABNORMAL
CARBOXYHEMOGLOBIN: 2.7 % (ref 0–5)
FIO2: ABNORMAL
HCO3 ARTERIAL: 26.7 MMOL/L (ref 22–26)
METHEMOGLOBIN: 0.7 % (ref 0–1.9)
MODE: ABNORMAL
NEGATIVE BASE EXCESS, ART: ABNORMAL MMOL/L (ref 0–2)
NOTIFICATION TIME: ABNORMAL
NOTIFICATION: ABNORMAL
O2 DEVICE/FLOW/%: ABNORMAL
O2 SAT, ARTERIAL: 93.6 % (ref 95–98)
OXYHEMOGLOBIN: ABNORMAL % (ref 95–98)
PATIENT TEMP: 37
PCO2 ARTERIAL: 54.6 MMHG (ref 35–45)
PCO2, ART, TEMP ADJ: ABNORMAL (ref 35–45)
PEEP/CPAP: ABNORMAL
PH ARTERIAL: 7.3 (ref 7.35–7.45)
PH, ART, TEMP ADJ: ABNORMAL (ref 7.35–7.45)
PO2 ARTERIAL: 91.4 MMHG (ref 80–100)
PO2, ART, TEMP ADJ: ABNORMAL MMHG (ref 80–100)
POSITIVE BASE EXCESS, ART: 0.2 MMOL/L (ref 0–2)
PSV: ABNORMAL
PT. POSITION: ABNORMAL
RESPIRATORY RATE: 16
SAMPLE SITE: ABNORMAL
SET RATE: ABNORMAL
TEXT FOR RESPIRATORY: ABNORMAL
TOTAL HB: ABNORMAL G/DL (ref 12–16)
TOTAL RATE: ABNORMAL
VT: ABNORMAL

## 2021-09-21 PROCEDURE — 2500000003 HC RX 250 WO HCPCS: Performed by: ORTHOPAEDIC SURGERY

## 2021-09-21 PROCEDURE — 64447 NJX AA&/STRD FEMORAL NRV IMG: CPT | Performed by: ANESTHESIOLOGY

## 2021-09-21 PROCEDURE — 6360000002 HC RX W HCPCS: Performed by: ANESTHESIOLOGY

## 2021-09-21 PROCEDURE — 2500000003 HC RX 250 WO HCPCS: Performed by: NURSE ANESTHETIST, CERTIFIED REGISTERED

## 2021-09-21 PROCEDURE — C1776 JOINT DEVICE (IMPLANTABLE): HCPCS | Performed by: ORTHOPAEDIC SURGERY

## 2021-09-21 PROCEDURE — 73560 X-RAY EXAM OF KNEE 1 OR 2: CPT

## 2021-09-21 PROCEDURE — G0378 HOSPITAL OBSERVATION PER HR: HCPCS

## 2021-09-21 PROCEDURE — 6360000002 HC RX W HCPCS: Performed by: NURSE ANESTHETIST, CERTIFIED REGISTERED

## 2021-09-21 PROCEDURE — 6370000000 HC RX 637 (ALT 250 FOR IP): Performed by: ORTHOPAEDIC SURGERY

## 2021-09-21 PROCEDURE — 6360000002 HC RX W HCPCS: Performed by: ORTHOPAEDIC SURGERY

## 2021-09-21 PROCEDURE — C9290 INJ, BUPIVACAINE LIPOSOME: HCPCS | Performed by: ANESTHESIOLOGY

## 2021-09-21 PROCEDURE — 2580000003 HC RX 258: Performed by: ORTHOPAEDIC SURGERY

## 2021-09-21 PROCEDURE — 3600000013 HC SURGERY LEVEL 3 ADDTL 15MIN: Performed by: ORTHOPAEDIC SURGERY

## 2021-09-21 PROCEDURE — 96365 THER/PROPH/DIAG IV INF INIT: CPT

## 2021-09-21 PROCEDURE — 3600000003 HC SURGERY LEVEL 3 BASE: Performed by: ORTHOPAEDIC SURGERY

## 2021-09-21 PROCEDURE — 94761 N-INVAS EAR/PLS OXIMETRY MLT: CPT

## 2021-09-21 PROCEDURE — 2709999900 HC NON-CHARGEABLE SUPPLY: Performed by: ORTHOPAEDIC SURGERY

## 2021-09-21 PROCEDURE — 2500000003 HC RX 250 WO HCPCS: Performed by: ANESTHESIOLOGY

## 2021-09-21 PROCEDURE — 27447 TOTAL KNEE ARTHROPLASTY: CPT | Performed by: ORTHOPAEDIC SURGERY

## 2021-09-21 PROCEDURE — 2580000003 HC RX 258: Performed by: ANESTHESIOLOGY

## 2021-09-21 PROCEDURE — 6370000000 HC RX 637 (ALT 250 FOR IP): Performed by: NURSE ANESTHETIST, CERTIFIED REGISTERED

## 2021-09-21 PROCEDURE — 36600 WITHDRAWAL OF ARTERIAL BLOOD: CPT

## 2021-09-21 PROCEDURE — 3700000000 HC ANESTHESIA ATTENDED CARE: Performed by: ORTHOPAEDIC SURGERY

## 2021-09-21 PROCEDURE — 7100000000 HC PACU RECOVERY - FIRST 15 MIN: Performed by: ORTHOPAEDIC SURGERY

## 2021-09-21 PROCEDURE — C1713 ANCHOR/SCREW BN/BN,TIS/BN: HCPCS | Performed by: ORTHOPAEDIC SURGERY

## 2021-09-21 PROCEDURE — 3700000001 HC ADD 15 MINUTES (ANESTHESIA): Performed by: ORTHOPAEDIC SURGERY

## 2021-09-21 PROCEDURE — 94660 CPAP INITIATION&MGMT: CPT

## 2021-09-21 PROCEDURE — 2720000010 HC SURG SUPPLY STERILE: Performed by: ORTHOPAEDIC SURGERY

## 2021-09-21 PROCEDURE — 82805 BLOOD GASES W/O2 SATURATION: CPT

## 2021-09-21 PROCEDURE — 7100000001 HC PACU RECOVERY - ADDTL 15 MIN: Performed by: ORTHOPAEDIC SURGERY

## 2021-09-21 DEVICE — TRAY TIB L71MM KNEE CO CHROM FIN MOD INTLOK VANGUARD: Type: IMPLANTABLE DEVICE | Site: KNEE | Status: FUNCTIONAL

## 2021-09-21 DEVICE — BEARING TIB L71MM THK10MM KNEE ARCM ANT STBL MOD COMPLT: Type: IMPLANTABLE DEVICE | Site: KNEE | Status: FUNCTIONAL

## 2021-09-21 DEVICE — IMPLANTABLE DEVICE: Type: IMPLANTABLE DEVICE | Site: KNEE | Status: FUNCTIONAL

## 2021-09-21 DEVICE — COMPONENT PAT DIA34MM THK7.8MM THN KNEE POLY 3 PEG SER A: Type: IMPLANTABLE DEVICE | Site: KNEE | Status: FUNCTIONAL

## 2021-09-21 DEVICE — CEMENT BNE 40GM HI VISC RADPQ FOR REV SURG: Type: IMPLANTABLE DEVICE | Site: KNEE | Status: FUNCTIONAL

## 2021-09-21 DEVICE — VNGD ANT STAB BRG 13X71: Type: IMPLANTABLE DEVICE | Site: KNEE | Status: FUNCTIONAL

## 2021-09-21 RX ORDER — FENTANYL CITRATE 50 UG/ML
25 INJECTION, SOLUTION INTRAMUSCULAR; INTRAVENOUS EVERY 5 MIN PRN
Status: DISCONTINUED | OUTPATIENT
Start: 2021-09-21 | End: 2021-09-21 | Stop reason: HOSPADM

## 2021-09-21 RX ORDER — LISINOPRIL 20 MG/1
20 TABLET ORAL DAILY
Status: DISCONTINUED | OUTPATIENT
Start: 2021-09-21 | End: 2021-09-24 | Stop reason: HOSPADM

## 2021-09-21 RX ORDER — ACETAMINOPHEN 500 MG
1000 TABLET ORAL ONCE
Status: COMPLETED | OUTPATIENT
Start: 2021-09-21 | End: 2021-09-21

## 2021-09-21 RX ORDER — ACETAMINOPHEN 325 MG/1
650 TABLET ORAL EVERY 6 HOURS
Status: DISCONTINUED | OUTPATIENT
Start: 2021-09-21 | End: 2021-09-24 | Stop reason: HOSPADM

## 2021-09-21 RX ORDER — LIDOCAINE HYDROCHLORIDE 10 MG/ML
1 INJECTION, SOLUTION EPIDURAL; INFILTRATION; INTRACAUDAL; PERINEURAL
Status: COMPLETED | OUTPATIENT
Start: 2021-09-21 | End: 2021-09-21

## 2021-09-21 RX ORDER — HYDROMORPHONE HCL 110MG/55ML
PATIENT CONTROLLED ANALGESIA SYRINGE INTRAVENOUS PRN
Status: DISCONTINUED | OUTPATIENT
Start: 2021-09-21 | End: 2021-09-21 | Stop reason: SDUPTHER

## 2021-09-21 RX ORDER — LIDOCAINE HYDROCHLORIDE 10 MG/ML
INJECTION, SOLUTION EPIDURAL; INFILTRATION; INTRACAUDAL; PERINEURAL PRN
Status: DISCONTINUED | OUTPATIENT
Start: 2021-09-21 | End: 2021-09-21 | Stop reason: SDUPTHER

## 2021-09-21 RX ORDER — ROCURONIUM BROMIDE 10 MG/ML
INJECTION, SOLUTION INTRAVENOUS PRN
Status: DISCONTINUED | OUTPATIENT
Start: 2021-09-21 | End: 2021-09-21 | Stop reason: SDUPTHER

## 2021-09-21 RX ORDER — SENNA AND DOCUSATE SODIUM 50; 8.6 MG/1; MG/1
1 TABLET, FILM COATED ORAL 2 TIMES DAILY
Status: DISCONTINUED | OUTPATIENT
Start: 2021-09-21 | End: 2021-09-24 | Stop reason: HOSPADM

## 2021-09-21 RX ORDER — HYDROCODONE BITARTRATE AND ACETAMINOPHEN 5; 325 MG/1; MG/1
2 TABLET ORAL PRN
Status: DISCONTINUED | OUTPATIENT
Start: 2021-09-21 | End: 2021-09-21 | Stop reason: HOSPADM

## 2021-09-21 RX ORDER — AMLODIPINE BESYLATE 10 MG/1
10 TABLET ORAL DAILY
Status: DISCONTINUED | OUTPATIENT
Start: 2021-09-22 | End: 2021-09-24 | Stop reason: HOSPADM

## 2021-09-21 RX ORDER — SODIUM CHLORIDE, SODIUM LACTATE, POTASSIUM CHLORIDE, CALCIUM CHLORIDE 600; 310; 30; 20 MG/100ML; MG/100ML; MG/100ML; MG/100ML
INJECTION, SOLUTION INTRAVENOUS CONTINUOUS
Status: DISCONTINUED | OUTPATIENT
Start: 2021-09-21 | End: 2021-09-21

## 2021-09-21 RX ORDER — HYDRALAZINE HYDROCHLORIDE 20 MG/ML
INJECTION INTRAMUSCULAR; INTRAVENOUS PRN
Status: DISCONTINUED | OUTPATIENT
Start: 2021-09-21 | End: 2021-09-21 | Stop reason: SDUPTHER

## 2021-09-21 RX ORDER — OXYCODONE HYDROCHLORIDE 5 MG/1
5 TABLET ORAL EVERY 4 HOURS PRN
Status: DISCONTINUED | OUTPATIENT
Start: 2021-09-21 | End: 2021-09-24 | Stop reason: HOSPADM

## 2021-09-21 RX ORDER — MORPHINE SULFATE 2 MG/ML
2 INJECTION, SOLUTION INTRAMUSCULAR; INTRAVENOUS EVERY 5 MIN PRN
Status: DISCONTINUED | OUTPATIENT
Start: 2021-09-21 | End: 2021-09-21 | Stop reason: HOSPADM

## 2021-09-21 RX ORDER — LABETALOL HYDROCHLORIDE 5 MG/ML
5 INJECTION, SOLUTION INTRAVENOUS EVERY 10 MIN PRN
Status: DISCONTINUED | OUTPATIENT
Start: 2021-09-21 | End: 2021-09-21 | Stop reason: HOSPADM

## 2021-09-21 RX ORDER — SCOLOPAMINE TRANSDERMAL SYSTEM 1 MG/1
1 PATCH, EXTENDED RELEASE TRANSDERMAL ONCE
Status: DISCONTINUED | OUTPATIENT
Start: 2021-09-21 | End: 2021-09-24

## 2021-09-21 RX ORDER — ASPIRIN 81 MG/1
81 TABLET ORAL 2 TIMES DAILY
Status: DISCONTINUED | OUTPATIENT
Start: 2021-09-21 | End: 2021-09-24 | Stop reason: HOSPADM

## 2021-09-21 RX ORDER — OXYCODONE HYDROCHLORIDE 10 MG/1
10 TABLET ORAL EVERY 4 HOURS PRN
Status: DISCONTINUED | OUTPATIENT
Start: 2021-09-21 | End: 2021-09-24 | Stop reason: HOSPADM

## 2021-09-21 RX ORDER — DEXAMETHASONE SODIUM PHOSPHATE 10 MG/ML
10 INJECTION, SOLUTION INTRAMUSCULAR; INTRAVENOUS ONCE
Status: COMPLETED | OUTPATIENT
Start: 2021-09-21 | End: 2021-09-21

## 2021-09-21 RX ORDER — MIDAZOLAM HYDROCHLORIDE 1 MG/ML
INJECTION INTRAMUSCULAR; INTRAVENOUS PRN
Status: DISCONTINUED | OUTPATIENT
Start: 2021-09-21 | End: 2021-09-21 | Stop reason: SDUPTHER

## 2021-09-21 RX ORDER — ALBUTEROL SULFATE 90 UG/1
AEROSOL, METERED RESPIRATORY (INHALATION) PRN
Status: DISCONTINUED | OUTPATIENT
Start: 2021-09-21 | End: 2021-09-21 | Stop reason: SDUPTHER

## 2021-09-21 RX ORDER — TRANEXAMIC ACID 100 MG/ML
INJECTION, SOLUTION INTRAVENOUS PRN
Status: DISCONTINUED | OUTPATIENT
Start: 2021-09-21 | End: 2021-09-21 | Stop reason: SDUPTHER

## 2021-09-21 RX ORDER — SODIUM CHLORIDE 9 MG/ML
25 INJECTION, SOLUTION INTRAVENOUS PRN
Status: DISCONTINUED | OUTPATIENT
Start: 2021-09-21 | End: 2021-09-24 | Stop reason: HOSPADM

## 2021-09-21 RX ORDER — HYDROCODONE BITARTRATE AND ACETAMINOPHEN 5; 325 MG/1; MG/1
1 TABLET ORAL PRN
Status: DISCONTINUED | OUTPATIENT
Start: 2021-09-21 | End: 2021-09-21 | Stop reason: HOSPADM

## 2021-09-21 RX ORDER — KETAMINE HYDROCHLORIDE 50 MG/ML
INJECTION, SOLUTION, CONCENTRATE INTRAMUSCULAR; INTRAVENOUS PRN
Status: DISCONTINUED | OUTPATIENT
Start: 2021-09-21 | End: 2021-09-21 | Stop reason: SDUPTHER

## 2021-09-21 RX ORDER — SODIUM CHLORIDE 0.9 % (FLUSH) 0.9 %
10 SYRINGE (ML) INJECTION EVERY 12 HOURS SCHEDULED
Status: DISCONTINUED | OUTPATIENT
Start: 2021-09-21 | End: 2021-09-24 | Stop reason: HOSPADM

## 2021-09-21 RX ORDER — SODIUM CHLORIDE 9 MG/ML
25 INJECTION, SOLUTION INTRAVENOUS PRN
Status: DISCONTINUED | OUTPATIENT
Start: 2021-09-21 | End: 2021-09-21 | Stop reason: HOSPADM

## 2021-09-21 RX ORDER — GABAPENTIN 400 MG/1
800 CAPSULE ORAL ONCE
Status: COMPLETED | OUTPATIENT
Start: 2021-09-21 | End: 2021-09-21

## 2021-09-21 RX ORDER — ONDANSETRON 2 MG/ML
INJECTION INTRAMUSCULAR; INTRAVENOUS PRN
Status: DISCONTINUED | OUTPATIENT
Start: 2021-09-21 | End: 2021-09-21 | Stop reason: SDUPTHER

## 2021-09-21 RX ORDER — OXYCODONE HYDROCHLORIDE AND ACETAMINOPHEN 5; 325 MG/1; MG/1
1-2 TABLET ORAL EVERY 4 HOURS PRN
Qty: 42 TABLET | Refills: 0 | Status: ON HOLD | OUTPATIENT
Start: 2021-09-21 | End: 2021-10-04 | Stop reason: HOSPADM

## 2021-09-21 RX ORDER — METOCLOPRAMIDE HYDROCHLORIDE 5 MG/ML
10 INJECTION INTRAMUSCULAR; INTRAVENOUS
Status: DISCONTINUED | OUTPATIENT
Start: 2021-09-21 | End: 2021-09-21 | Stop reason: HOSPADM

## 2021-09-21 RX ORDER — PROPOFOL 10 MG/ML
INJECTION, EMULSION INTRAVENOUS PRN
Status: DISCONTINUED | OUTPATIENT
Start: 2021-09-21 | End: 2021-09-21 | Stop reason: SDUPTHER

## 2021-09-21 RX ORDER — ONDANSETRON 2 MG/ML
4 INJECTION INTRAMUSCULAR; INTRAVENOUS
Status: DISCONTINUED | OUTPATIENT
Start: 2021-09-21 | End: 2021-09-21 | Stop reason: HOSPADM

## 2021-09-21 RX ORDER — SODIUM CHLORIDE, SODIUM LACTATE, POTASSIUM CHLORIDE, CALCIUM CHLORIDE 600; 310; 30; 20 MG/100ML; MG/100ML; MG/100ML; MG/100ML
INJECTION, SOLUTION INTRAVENOUS CONTINUOUS
Status: DISCONTINUED | OUTPATIENT
Start: 2021-09-21 | End: 2021-09-24 | Stop reason: HOSPADM

## 2021-09-21 RX ORDER — SODIUM CHLORIDE 0.9 % (FLUSH) 0.9 %
10 SYRINGE (ML) INJECTION PRN
Status: DISCONTINUED | OUTPATIENT
Start: 2021-09-21 | End: 2021-09-24 | Stop reason: HOSPADM

## 2021-09-21 RX ORDER — DIPHENHYDRAMINE HYDROCHLORIDE 50 MG/ML
12.5 INJECTION INTRAMUSCULAR; INTRAVENOUS
Status: DISCONTINUED | OUTPATIENT
Start: 2021-09-21 | End: 2021-09-21 | Stop reason: HOSPADM

## 2021-09-21 RX ORDER — BUPIVACAINE HYDROCHLORIDE 5 MG/ML
INJECTION, SOLUTION EPIDURAL; INTRACAUDAL
Status: DISCONTINUED | OUTPATIENT
Start: 2021-09-21 | End: 2021-09-21 | Stop reason: SDUPTHER

## 2021-09-21 RX ORDER — SODIUM CHLORIDE 0.9 % (FLUSH) 0.9 %
10 SYRINGE (ML) INJECTION PRN
Status: DISCONTINUED | OUTPATIENT
Start: 2021-09-21 | End: 2021-09-21 | Stop reason: HOSPADM

## 2021-09-21 RX ORDER — DEXAMETHASONE SODIUM PHOSPHATE 4 MG/ML
INJECTION, SOLUTION INTRA-ARTICULAR; INTRALESIONAL; INTRAMUSCULAR; INTRAVENOUS; SOFT TISSUE PRN
Status: DISCONTINUED | OUTPATIENT
Start: 2021-09-21 | End: 2021-09-21 | Stop reason: SDUPTHER

## 2021-09-21 RX ORDER — HYDRALAZINE HYDROCHLORIDE 20 MG/ML
5 INJECTION INTRAMUSCULAR; INTRAVENOUS EVERY 10 MIN PRN
Status: DISCONTINUED | OUTPATIENT
Start: 2021-09-21 | End: 2021-09-21 | Stop reason: HOSPADM

## 2021-09-21 RX ORDER — FENTANYL CITRATE 50 UG/ML
50 INJECTION, SOLUTION INTRAMUSCULAR; INTRAVENOUS EVERY 5 MIN PRN
Status: DISCONTINUED | OUTPATIENT
Start: 2021-09-21 | End: 2021-09-21 | Stop reason: HOSPADM

## 2021-09-21 RX ORDER — FENTANYL CITRATE 50 UG/ML
INJECTION, SOLUTION INTRAMUSCULAR; INTRAVENOUS PRN
Status: DISCONTINUED | OUTPATIENT
Start: 2021-09-21 | End: 2021-09-21 | Stop reason: SDUPTHER

## 2021-09-21 RX ORDER — METOPROLOL TARTRATE 5 MG/5ML
INJECTION INTRAVENOUS PRN
Status: DISCONTINUED | OUTPATIENT
Start: 2021-09-21 | End: 2021-09-21 | Stop reason: SDUPTHER

## 2021-09-21 RX ORDER — CALCIUM CHLORIDE 100 MG/ML
INJECTION INTRAVENOUS; INTRAVENTRICULAR PRN
Status: DISCONTINUED | OUTPATIENT
Start: 2021-09-21 | End: 2021-09-21 | Stop reason: ALTCHOICE

## 2021-09-21 RX ORDER — SODIUM CHLORIDE 0.9 % (FLUSH) 0.9 %
10 SYRINGE (ML) INJECTION EVERY 12 HOURS SCHEDULED
Status: DISCONTINUED | OUTPATIENT
Start: 2021-09-21 | End: 2021-09-21 | Stop reason: HOSPADM

## 2021-09-21 RX ORDER — ONDANSETRON 2 MG/ML
4 INJECTION INTRAMUSCULAR; INTRAVENOUS EVERY 6 HOURS PRN
Status: DISCONTINUED | OUTPATIENT
Start: 2021-09-21 | End: 2021-09-24 | Stop reason: HOSPADM

## 2021-09-21 RX ORDER — ONDANSETRON 4 MG/1
4 TABLET, ORALLY DISINTEGRATING ORAL EVERY 8 HOURS PRN
Status: DISCONTINUED | OUTPATIENT
Start: 2021-09-21 | End: 2021-09-24 | Stop reason: HOSPADM

## 2021-09-21 RX ORDER — MEPERIDINE HYDROCHLORIDE 25 MG/ML
12.5 INJECTION INTRAMUSCULAR; INTRAVENOUS; SUBCUTANEOUS EVERY 5 MIN PRN
Status: DISCONTINUED | OUTPATIENT
Start: 2021-09-21 | End: 2021-09-21 | Stop reason: HOSPADM

## 2021-09-21 RX ORDER — ASPIRIN 81 MG/1
81 TABLET ORAL 2 TIMES DAILY
Qty: 90 TABLET | Refills: 1 | Status: ON HOLD | OUTPATIENT
Start: 2021-09-21 | End: 2021-10-04 | Stop reason: HOSPADM

## 2021-09-21 RX ADMIN — ROCURONIUM BROMIDE 5 MG: 10 INJECTION, SOLUTION INTRAVENOUS at 10:38

## 2021-09-21 RX ADMIN — ASPIRIN 81 MG: 81 TABLET, COATED ORAL at 20:43

## 2021-09-21 RX ADMIN — LIDOCAINE HYDROCHLORIDE 50 MG: 10 INJECTION, SOLUTION EPIDURAL; INFILTRATION; INTRACAUDAL; PERINEURAL at 10:38

## 2021-09-21 RX ADMIN — CEFAZOLIN 2000 MG: 10 INJECTION, POWDER, FOR SOLUTION INTRAVENOUS at 20:42

## 2021-09-21 RX ADMIN — FENTANYL CITRATE 50 MCG: 50 INJECTION, SOLUTION INTRAMUSCULAR; INTRAVENOUS at 10:36

## 2021-09-21 RX ADMIN — TRANEXAMIC ACID 1000 MG: 100 INJECTION, SOLUTION INTRAVENOUS at 12:55

## 2021-09-21 RX ADMIN — GABAPENTIN 800 MG: 400 CAPSULE ORAL at 09:05

## 2021-09-21 RX ADMIN — FENTANYL CITRATE 100 MCG: 50 INJECTION, SOLUTION INTRAMUSCULAR; INTRAVENOUS at 11:32

## 2021-09-21 RX ADMIN — HYDROMORPHONE HYDROCHLORIDE 0.5 MG: 2 INJECTION, SOLUTION INTRAMUSCULAR; INTRAVENOUS; SUBCUTANEOUS at 11:43

## 2021-09-21 RX ADMIN — HYDROMORPHONE HYDROCHLORIDE 0.5 MG: 2 INJECTION, SOLUTION INTRAMUSCULAR; INTRAVENOUS; SUBCUTANEOUS at 11:20

## 2021-09-21 RX ADMIN — METOROPROLOL TARTRATE 3 MG: 5 INJECTION, SOLUTION INTRAVENOUS at 13:05

## 2021-09-21 RX ADMIN — TRANEXAMIC ACID 1000 MG: 100 INJECTION, SOLUTION INTRAVENOUS at 10:56

## 2021-09-21 RX ADMIN — SODIUM CHLORIDE, PRESERVATIVE FREE 10 ML: 5 INJECTION INTRAVENOUS at 21:58

## 2021-09-21 RX ADMIN — DEXAMETHASONE SODIUM PHOSPHATE 8 MG: 4 INJECTION, SOLUTION INTRAMUSCULAR; INTRAVENOUS at 11:22

## 2021-09-21 RX ADMIN — SUGAMMADEX 200 MG: 100 INJECTION, SOLUTION INTRAVENOUS at 13:00

## 2021-09-21 RX ADMIN — KETAMINE HYDROCHLORIDE 25 MG: 50 INJECTION, SOLUTION INTRAMUSCULAR; INTRAVENOUS at 10:37

## 2021-09-21 RX ADMIN — ALBUTEROL SULFATE 8 PUFF: 90 AEROSOL, METERED RESPIRATORY (INHALATION) at 10:50

## 2021-09-21 RX ADMIN — LIDOCAINE HYDROCHLORIDE 1 ML: 10 INJECTION, SOLUTION EPIDURAL; INFILTRATION; INTRACAUDAL; PERINEURAL at 09:19

## 2021-09-21 RX ADMIN — FENTANYL CITRATE 100 MCG: 50 INJECTION, SOLUTION INTRAMUSCULAR; INTRAVENOUS at 11:06

## 2021-09-21 RX ADMIN — HYDROMORPHONE HYDROCHLORIDE 0.5 MG: 2 INJECTION, SOLUTION INTRAMUSCULAR; INTRAVENOUS; SUBCUTANEOUS at 11:27

## 2021-09-21 RX ADMIN — MIDAZOLAM 2 MG: 1 INJECTION INTRAMUSCULAR; INTRAVENOUS at 10:25

## 2021-09-21 RX ADMIN — LISINOPRIL 20 MG: 20 TABLET ORAL at 21:57

## 2021-09-21 RX ADMIN — FENTANYL CITRATE 50 MCG: 50 INJECTION, SOLUTION INTRAMUSCULAR; INTRAVENOUS at 11:02

## 2021-09-21 RX ADMIN — DEXAMETHASONE SODIUM PHOSPHATE 10 MG: 10 INJECTION, SOLUTION INTRAMUSCULAR; INTRAVENOUS at 09:24

## 2021-09-21 RX ADMIN — PROPOFOL 200 MG: 10 INJECTION, EMULSION INTRAVENOUS at 10:38

## 2021-09-21 RX ADMIN — ONDANSETRON 4 MG: 2 INJECTION, SOLUTION INTRAMUSCULAR; INTRAVENOUS at 12:48

## 2021-09-21 RX ADMIN — ACETAMINOPHEN 650 MG: 325 TABLET, FILM COATED ORAL at 20:43

## 2021-09-21 RX ADMIN — BUPIVACAINE HYDROCHLORIDE 20 ML: 5 INJECTION, SOLUTION EPIDURAL; INTRACAUDAL at 14:30

## 2021-09-21 RX ADMIN — BUPIVACAINE 20 ML: 13.3 INJECTION, SUSPENSION, LIPOSOMAL INFILTRATION at 14:30

## 2021-09-21 RX ADMIN — HYDRALAZINE HYDROCHLORIDE 5 MG: 20 INJECTION, SOLUTION INTRAMUSCULAR; INTRAVENOUS at 13:00

## 2021-09-21 RX ADMIN — DOCUSATE SODIUM 50MG AND SENNOSIDES 8.6MG 1 TABLET: 8.6; 5 TABLET, FILM COATED ORAL at 20:43

## 2021-09-21 RX ADMIN — SODIUM CHLORIDE, POTASSIUM CHLORIDE, SODIUM LACTATE AND CALCIUM CHLORIDE: 600; 310; 30; 20 INJECTION, SOLUTION INTRAVENOUS at 15:42

## 2021-09-21 RX ADMIN — HYDROMORPHONE HYDROCHLORIDE 0.5 MG: 2 INJECTION, SOLUTION INTRAMUSCULAR; INTRAVENOUS; SUBCUTANEOUS at 11:15

## 2021-09-21 RX ADMIN — HYDRALAZINE HYDROCHLORIDE 5 MG: 20 INJECTION, SOLUTION INTRAMUSCULAR; INTRAVENOUS at 12:20

## 2021-09-21 RX ADMIN — FENTANYL CITRATE 100 MCG: 50 INJECTION, SOLUTION INTRAMUSCULAR; INTRAVENOUS at 12:06

## 2021-09-21 RX ADMIN — CEFAZOLIN SODIUM 2000 MG: 10 INJECTION, POWDER, FOR SOLUTION INTRAVENOUS at 10:48

## 2021-09-21 RX ADMIN — METOROPROLOL TARTRATE 2 MG: 5 INJECTION, SOLUTION INTRAVENOUS at 13:10

## 2021-09-21 RX ADMIN — SODIUM CHLORIDE, POTASSIUM CHLORIDE, SODIUM LACTATE AND CALCIUM CHLORIDE: 600; 310; 30; 20 INJECTION, SOLUTION INTRAVENOUS at 21:54

## 2021-09-21 RX ADMIN — ROCURONIUM BROMIDE 45 MG: 10 INJECTION, SOLUTION INTRAVENOUS at 10:40

## 2021-09-21 RX ADMIN — ACETAMINOPHEN 1000 MG: 500 TABLET, FILM COATED ORAL at 09:05

## 2021-09-21 RX ADMIN — SODIUM CHLORIDE, POTASSIUM CHLORIDE, SODIUM LACTATE AND CALCIUM CHLORIDE: 600; 310; 30; 20 INJECTION, SOLUTION INTRAVENOUS at 09:19

## 2021-09-21 ASSESSMENT — PULMONARY FUNCTION TESTS
PIF_VALUE: 27
PIF_VALUE: 5
PIF_VALUE: 24
PIF_VALUE: 27
PIF_VALUE: 24
PIF_VALUE: 26
PIF_VALUE: 27
PIF_VALUE: 27
PIF_VALUE: 25
PIF_VALUE: 0
PIF_VALUE: 24
PIF_VALUE: 24
PIF_VALUE: 25
PIF_VALUE: 1
PIF_VALUE: 28
PIF_VALUE: 24
PIF_VALUE: 24
PIF_VALUE: 25
PIF_VALUE: 24
PIF_VALUE: 24
PIF_VALUE: 22
PIF_VALUE: 22
PIF_VALUE: 25
PIF_VALUE: 27
PIF_VALUE: 25
PIF_VALUE: 27
PIF_VALUE: 35
PIF_VALUE: 24
PIF_VALUE: 1
PIF_VALUE: 27
PIF_VALUE: 27
PIF_VALUE: 25
PIF_VALUE: 21
PIF_VALUE: 22
PIF_VALUE: 33
PIF_VALUE: 25
PIF_VALUE: 24
PIF_VALUE: 1
PIF_VALUE: 28
PIF_VALUE: 27
PIF_VALUE: 26
PIF_VALUE: 3
PIF_VALUE: 26
PIF_VALUE: 28
PIF_VALUE: 26
PIF_VALUE: 23
PIF_VALUE: 24
PIF_VALUE: 25
PIF_VALUE: 26
PIF_VALUE: 25
PIF_VALUE: 0
PIF_VALUE: 1
PIF_VALUE: 24
PIF_VALUE: 24
PIF_VALUE: 25
PIF_VALUE: 21
PIF_VALUE: 25
PIF_VALUE: 24
PIF_VALUE: 1
PIF_VALUE: 24
PIF_VALUE: 27
PIF_VALUE: 22
PIF_VALUE: 23
PIF_VALUE: 25
PIF_VALUE: 11
PIF_VALUE: 24
PIF_VALUE: 24
PIF_VALUE: 26
PIF_VALUE: 0
PIF_VALUE: 27
PIF_VALUE: 26
PIF_VALUE: 20
PIF_VALUE: 28
PIF_VALUE: 16
PIF_VALUE: 25
PIF_VALUE: 17
PIF_VALUE: 33
PIF_VALUE: 27
PIF_VALUE: 26
PIF_VALUE: 24
PIF_VALUE: 24
PIF_VALUE: 25
PIF_VALUE: 25
PIF_VALUE: 27
PIF_VALUE: 26
PIF_VALUE: 25
PIF_VALUE: 24
PIF_VALUE: 28
PIF_VALUE: 27
PIF_VALUE: 25
PIF_VALUE: 22
PIF_VALUE: 24
PIF_VALUE: 24
PIF_VALUE: 18
PIF_VALUE: 25
PIF_VALUE: 15
PIF_VALUE: 24
PIF_VALUE: 25
PIF_VALUE: 27
PIF_VALUE: 3
PIF_VALUE: 27
PIF_VALUE: 25
PIF_VALUE: 24
PIF_VALUE: 26
PIF_VALUE: 26
PIF_VALUE: 24
PIF_VALUE: 0
PIF_VALUE: 29
PIF_VALUE: 25
PIF_VALUE: 3
PIF_VALUE: 27
PIF_VALUE: 24
PIF_VALUE: 24
PIF_VALUE: 26
PIF_VALUE: 27
PIF_VALUE: 0
PIF_VALUE: 4
PIF_VALUE: 25
PIF_VALUE: 27
PIF_VALUE: 24
PIF_VALUE: 24
PIF_VALUE: 0
PIF_VALUE: 25
PIF_VALUE: 24
PIF_VALUE: 26
PIF_VALUE: 27
PIF_VALUE: 24
PIF_VALUE: 24
PIF_VALUE: 27
PIF_VALUE: 24
PIF_VALUE: 21
PIF_VALUE: 24
PIF_VALUE: 28
PIF_VALUE: 24
PIF_VALUE: 27
PIF_VALUE: 24
PIF_VALUE: 25
PIF_VALUE: 27
PIF_VALUE: 30
PIF_VALUE: 0
PIF_VALUE: 25
PIF_VALUE: 29
PIF_VALUE: 5
PIF_VALUE: 26
PIF_VALUE: 24
PIF_VALUE: 24
PIF_VALUE: 8
PIF_VALUE: 25
PIF_VALUE: 14
PIF_VALUE: 24
PIF_VALUE: 21
PIF_VALUE: 28
PIF_VALUE: 31
PIF_VALUE: 25
PIF_VALUE: 28
PIF_VALUE: 1
PIF_VALUE: 11
PIF_VALUE: 27
PIF_VALUE: 26
PIF_VALUE: 24
PIF_VALUE: 24
PIF_VALUE: 22
PIF_VALUE: 27

## 2021-09-21 ASSESSMENT — PAIN - FUNCTIONAL ASSESSMENT: PAIN_FUNCTIONAL_ASSESSMENT: 0-10

## 2021-09-21 ASSESSMENT — PAIN SCALES - GENERAL
PAINLEVEL_OUTOF10: 0
PAINLEVEL_OUTOF10: 2

## 2021-09-21 ASSESSMENT — ENCOUNTER SYMPTOMS: STRIDOR: 0

## 2021-09-21 ASSESSMENT — LIFESTYLE VARIABLES: SMOKING_STATUS: 1

## 2021-09-21 NOTE — ANESTHESIA POSTPROCEDURE EVALUATION
POST- ANESTHESIA EVALUATION       Pt Name: Zuleyma Fortuen  MRN: [id number]  YOB: 1960  Date of evaluation: 9/21/2021  Time:  3:45 PM      /75   Pulse 76   Temp 98.2 °F (36.8 °C) (Infrared)   Resp 9   Ht 5' 2\" (1.575 m)   Wt 240 lb (108.9 kg)   SpO2 96%   BMI 43.90 kg/m²      Consciousness Level  Awake  Cardiopulmonary Status  Stable  Pain Adequately Treated YES  Nausea / Vomiting  NO  Adequate Hydration  YES  Anesthesia Related Complications NONE      Electronically signed by Keely Hayes MD on 9/21/2021 at 3:45 PM       Department of Anesthesiology  Postprocedure Note    Patient: Zuleyma Fortune  MRN: [id number]  YOB: 1960  Date of evaluation: 9/21/2021  Time:  3:45 PM     Procedure Summary     Date: 09/21/21 Room / Location: [address] [place] [address]  03 / [facility] [place] [facility]    Anesthesia Start: 1027 Anesthesia Stop: 4379    Procedure: KNEE TOTAL ARTHROPLASTY BILATERAL (Bilateral Knee) Diagnosis: (DEGENERATIVE JOINT DISEASE BILATERAL KNEES)    Surgeons: Estrellita Howard MD Responsible Provider: Dilcia Andrew MD    Anesthesia Type: general, regional ASA Status: 3          Anesthesia Type: general, regional    Monico Phase I: Monico Score: 4    Monico Phase II:      Last vitals: Reviewed and per EMR flowsheets.        Anesthesia Post Evaluation

## 2021-09-21 NOTE — ANESTHESIA PROCEDURE NOTES
Peripheral Block    Patient location during procedure: post-op  Start time: 9/21/2021 2:00 PM  End time: 9/21/2021 2:20 PM  Staffing  Anesthesiologist: Zuri Barclay MD  Preanesthetic Checklist  Completed: patient identified, IV checked, site marked, risks and benefits discussed, surgical consent, monitors and equipment checked, pre-op evaluation, timeout performed, anesthesia consent given, oxygen available and patient being monitored  Peripheral Block  Patient position: supine  Prep: ChloraPrep  Patient monitoring: cardiac monitor, continuous pulse ox, frequent blood pressure checks and IV access  Block type: Femoral  Laterality: bilateral  Injection technique: single-shot  Guidance: ultrasound guided  Local infiltration: lidocaine  Infiltration strength: 1 %  Dose: 3 mL  Adductor canal  Provider prep: mask and sterile gloves  Local infiltration: lidocaine  Needle  Needle type: short-bevel   Needle gauge: 20 G  Needle localization: anatomical landmarks and ultrasound guidance  Needle insertion depth: 5 cm  Assessment  Injection assessment: negative aspiration for heme, no paresthesia on injection and local visualized surrounding nerve on ultrasound  Paresthesia pain: none  Slow fractionated injection: yes  Hemodynamics: stable  Medications Administered  Bupivacaine liposome (EXPAREL) injection 1.3%, 20 mL  bupivacaine (MARCAINE) PF injection 0.5%, 20 mL  Reason for block: post-op pain management and at surgeon's request

## 2021-09-21 NOTE — INTERVAL H&P NOTE
Update History & Physical    The patient's History and Physical of September 14, 2021 was reviewed with the patient and I examined the patient. There was no change. I concur with findings. Here today for bilateral total knee arthroscopy. Pt awake, alert, fully oriented. Heart rate and rhythm regular. No respiratory distress. LS CTA bilaterally. Pt has upper dentures in place today. Pt also reports prolonged emergence from anesthesia in the past. NPO. Took amlodipine this am with sip of water. Stopped motrin one week ago. Denies taking any other blood thinning medications. Chronic SOB with exertion. Denies chest pain/pressure, palpitations, N/V/D or constipation, recent URI, fever or chills. Review vitals per RN flowsheet.        Electronically signed by CATHY Barksdale CNP on 9/21/2021 at 8:20 AM

## 2021-09-21 NOTE — ANESTHESIA PRE PROCEDURE
Department of Anesthesiology  Preprocedure Note       Name:  Reese Sweeney   Age:  64 y.o.  :  1960                                          MRN:  [id number]         Date:  2021      Surgeon: Baljit Woodruff):  Antonieta Palacios MD    Procedure: Procedure(s):  KNEE TOTAL ARTHROPLASTY BILATERAL    Medications prior to admission:   Prior to Admission medications    Medication Sig Start Date End Date Taking?  Authorizing Provider   amLODIPine (NORVASC) 10 MG tablet Take 10 mg by mouth daily    Historical Provider, MD   acetaminophen (TYLENOL 8 HOUR ARTHRITIS PAIN) 650 MG extended release tablet Take 650 mg by mouth every 8 hours as needed for Pain    Historical Provider, MD   lisinopril (PRINIVIL;ZESTRIL) 20 MG tablet Take 20 mg by mouth daily     Historical Provider, MD       Current medications:    Current Facility-Administered Medications   Medication Dose Route Frequency Provider Last Rate Last Admin    ceFAZolin (ANCEF) 2000 mg in dextrose 5 % 50 mL IVPB  2,000 mg IntraVENous On Call to Seb Hines MD        acetaminophen (TYLENOL) tablet 1,000 mg  1,000 mg Oral Once Antonieta Palacios MD        dexamethasone (PF) (DECADRON) injection 10 mg  10 mg IntraVENous Once Antonieta Palacios MD        gabapentin (NEURONTIN) capsule 800 mg  800 mg Oral Once Antonieta Palacios MD        scopolamine (TRANSDERM-SCOP) transdermal patch 1 patch  1 patch TransDERmal Once Antonieta Palacios MD        lactated ringers infusion   IntraVENous Continuous Nelson Martinez MD        sodium chloride flush 0.9 % injection 10 mL  10 mL IntraVENous 2 times per day Nelson Martinez MD        sodium chloride flush 0.9 % injection 10 mL  10 mL IntraVENous PRN Nelson Martinez MD        0.9 % sodium chloride infusion  25 mL IntraVENous PRN Nelson Martinez MD        lidocaine PF 1 % injection 1 mL  1 mL IntraDERmal Once PRN Nelson Martinez MD           Allergies:  No Known Allergies    Problem List:  There is no problem list on file for this patient. Past Medical History:        Diagnosis Date    Anxiety     Arthritis     Depression     Hypertension     Knee pain     x10 years    Murmur     PONV (postoperative nausea and vomiting)     Sleep apnea     no machine       Past Surgical History:        Procedure Laterality Date    EYE SURGERY Left 2018    had a tear in left eye- had laser surgery to repair    ROTATOR CUFF REPAIR Left     SKIN BIOPSY      negative    TUMOR REMOVAL      left foot    ULNAR TUNNEL RELEASE Right     pinched nerve- has a plate and 7 screws        Social History:    Social History     Tobacco Use    Smoking status: Current Every Day Smoker     Packs/day: 1.00    Smokeless tobacco: Never Used   Substance Use Topics    Alcohol use: Not Currently                                Ready to quit: Not Answered  Counseling given: Not Answered      Vital Signs (Current): There were no vitals filed for this visit. BP Readings from Last 3 Encounters:   09/14/21 (!) 140/84   08/11/21 (S) (!) 194/112       NPO Status:                                                                                 BMI:   Wt Readings from Last 3 Encounters:   09/14/21 240 lb (108.9 kg)   08/11/21 245 lb (111.1 kg)     There is no height or weight on file to calculate BMI.    CBC:   Lab Results   Component Value Date    WBC 8.3 09/14/2021    RBC 5.73 09/14/2021    HGB 17.1 09/14/2021    HCT 51.2 09/14/2021    MCV 89.3 09/14/2021    RDW 13.5 09/14/2021     09/14/2021       CMP:   Lab Results   Component Value Date     09/14/2021    K 4.3 09/14/2021     09/14/2021    CO2 24 09/14/2021    BUN 12 09/14/2021    CREATININE 0.57 09/14/2021    GFRAA >60 09/14/2021    LABGLOM >60 09/14/2021    GLUCOSE 91 09/14/2021    CALCIUM 9.5 09/14/2021       POC Tests: No results for input(s): POCGLU, POCNA, POCK, POCCL, POCBUN, POCHEMO, POCHCT in the last 72 hours.     Coags: No results found for: PROTIME, INR, APTT    HCG (If Applicable): No results found for: PREGTESTUR, PREGSERUM, HCG, HCGQUANT     ABGs: No results found for: PHART, PO2ART, TLW2YYX, APW7OYN, BEART, C1KNNILX     Type & Screen (If Applicable):  No results found for: LABABO, LABRH    Drug/Infectious Status (If Applicable):  No results found for: HIV, HEPCAB    COVID-19 Screening (If Applicable):   Lab Results   Component Value Date    COVID19 Not Detected 09/17/2021           Anesthesia Evaluation  Patient summary reviewed and Nursing notes reviewed   history of anesthetic complications: PONV. Airway: Mallampati: III  TM distance: >3 FB   Neck ROM: full  Mouth opening: > = 3 FB Dental:    (+) upper dentures      Pulmonary: breath sounds clear to auscultation  (+) sleep apnea: on noncompliant,  current smoker    (-) rhonchi, wheezes, rales, stridor and no decreased breath sounds                           Cardiovascular:    (+) hypertension:,     (-) murmur, weak pulses,  friction rub, systolic click, carotid bruit,  JVD and peripheral edema    ECG reviewed  Rhythm: regular  Rate: normal  Echocardiogram reviewed                  Neuro/Psych:   (+) psychiatric history:            GI/Hepatic/Renal:   (+) morbid obesity          Endo/Other:    (+) : arthritis: no interval change. , . Abdominal:   (+) obese,           Vascular: negative vascular ROS. Other Findings:           Anesthesia Plan      general and regional     ASA 3     (GA/ bilateral adductor N block post op )  Induction: intravenous. MIPS: Postoperative opioids intended and Prophylactic antiemetics administered. Anesthetic plan and risks discussed with patient. Plan discussed with CRNA.                 Michelle Perrin MD   9/21/2021

## 2021-09-21 NOTE — OP NOTE
Operative Note      Patient: Manjinder Tena  YOB: 1960  MRN: [id number]    Date of Procedure: 9/21/2021    Pre-Op Diagnosis: DEGENERATIVE JOINT DISEASE BILATERAL KNEES    Post-Op Diagnosis: Same       Procedure(s):  KNEE TOTAL ARTHROPLASTY BILATERAL    Surgeon(s):  Susan Hassan MD    Assistant:   Resident: DO Harrison Casiano CST  Anesthesia: General    Estimated Blood Loss (mL): less than 50     Complications: None    Specimens:   * No specimens in log *    Implants:  Implant Name Type Inv. Item Serial No.  Lot No. LRB No. Used Action   CEMENT BNE 40GM HI VISC RADPQ FOR REV SURG  CEMENT BNE 40GM HI VISC RADPQ FOR REV SURG  [employer]- [id number] Left 1 Implanted   CEMENT BNE 40GM HI VISC RADPQ FOR REV SURG  CEMENT BNE 40GM HI VISC RADPQ FOR REV SURG  [employer]- [id number] Left 1 Implanted   CEMENT BNE 40GM HI VISC RADPQ FOR REV SURG  CEMENT BNE 40GM HI VISC RADPQ FOR REV SURG  [employer]- [id number] Right 1 Implanted   CEMENT BNE 40GM HI VISC RADPQ FOR REV SURG  CEMENT BNE 40GM HI VISC RADPQ FOR REV SURG  [employer]- [id number] Right 1 Implanted   COMPONENT PAT [id number] THK7. 8MM THN KNEE POLY 3 PEG SER A  COMPONENT PAT [id number] THK7. 8MM THN KNEE POLY 3 PEG SER A  [employer]- [id number] Left 1 Implanted   TRAY TIB L71MM KNEE CO CHROM FIN MOD INTLOK VANGUARD  TRAY TIB L71MM KNEE CO CHROM FIN MOD INTLOK VANGUARD  [employer]- [id number] Left 1 Implanted   COMPONENT FEM 62. 5MM L KNEE CO CHROM NP CRUCE RET INTLOK  COMPONENT FEM 62. 5MM L KNEE CO CHROM NP CRUCE RET INTLOK  [employer]- [id number] Left 1 Implanted   VNGD ANT STAB BRG 13X71  VNGD ANT STAB BRG 13X71  [facility] [id number] Left 1 Implanted   COMPONENT FEM 62. 5MM R KNEE CO CHROM NP CRUCE RET INTLOK  COMPONENT FEM 62. 5MM R KNEE CO CHROM NP CRUCE RET INTLOK  [employer]- [id number] Right 1 flexed and revealed significant  arthrosis. Osteophytes were removed via rongeur. A drill hole was made in the distal femur and the femoral canal was then irrigated and suctioned. A fluted IM apple was inserted and a distal femoral cut was made at 5 degrees of valgus at the +2 setting. The proximal tibia was then exposed after resection of the ACL and lateral meniscus. An extra-medullary tibial cutting jug was then aligned in reference to the tibia tubercle and ankle mortise and positional with a slight posterior slope. The cut was made with minimal cutting of the lowest depth of the tibial wear and the fragment removed. The distal femur was then approached and femoral sizing guide with 3 degrees of external rotation was placed flush with the surface and drill holes made and femoral size determined. The appropriate size cutting jig was then placed and anterior, posterior, and chamfer cuts made with an oscillating saw. A laminar  was inserted with care to avoid damaging the MCL. Posterior osteophytes were removed and the capsule stripped from the posterior femoral condyles. The capsule was then injected with the orthopedic cocktail at this time. The tibial cut was then assessed with a baseplate and alignment apple to assure proper cut. Spacer blocks were then inserted and the knee was assessed to determine the amount of soft tissue release and osteophyte removal necessary  to establish symmetric flexion and extension gaps. The tibia was then elevated, and the above sized tibial plate was positioned for proper external rotation with an alignment apple down the middle-third of the tibial tubercles. This was pinned in place, the proximal tibialis reamed, the fins were then repacked. The appropriate size femur was positioned and various size of the polyethylene trials were inserted. The knee was assessed for  ROM and patellar tracking.  Satisfied with this, this distal femoral logs were drilled and then all the trial components were removed. The knee was irrigated and dried while the cement was prepared. Cement was applied to to both implant and cut surfaces and the implants impacted and the compression with one size larger poly inserted and excess cement removed. The patella was placed and compressed as well. The knee was placed in full extension and then injected with the remainder  of the 100ml of the orthopedic cocktail. The Irrisept lavage was carried out for the 1 minutes. This was then irrigated and a permanent polyethylene was insert was injected with platelet rich/poor plasma and the tourniquet was released at 50 minutes. Hemostasis was excellent. The knee was closed with a #1 Strata-Fix and vastus with a double-layer of O-Vicryl suture. The subcutaneous tissue closed with a 2-0 Monocryl Strata-Fix  and then a Zip-line used for the skin. A temporary dressing was applied on top of this and wrapped with an Ace bandage. During course of closure of the left knee the right knee was exposed and placed in leg dowell. At this point we had a complete change of gloves suction tips Bovie tips and irrigation tips. The right leg was then exsanguinated and the tourniquet inflated to 350 mmHg. An identical procedure was performed to the right knee as well as the left. The difference being in the size the polyethylene is placed in the section above the implants. At the time of tourniquet deflation on the right side was also 50 minutes. The knee was closed in an identical fashion and again covered with Zipline skin incision and covered with an Aquacel and Ace bandage.     Patient was subsequently awakened and taken to postanesthesia care unit in good condition    Electronically signed by Isidoro Lara MD on 9/21/2021 at 1:10 PM

## 2021-09-21 NOTE — PROGRESS NOTES
Patient has been in PACU for over 1 hour and remains minimally reactive with oral airway intact. Dr Yonis Mccauley updated. Dr Yonis Mccauley examines pt at bedside and orders bipap. Patient placed on bipap 14/8 rate of 14 at 55% fio2.

## 2021-09-21 NOTE — DISCHARGE INSTR - COC
Continuity of Care Form    Patient Name: David Patel   :  1960  MRN:  [id number]    Admit date:  (Not on file)  Discharge date:      Code Status Order: No Order   Advance Directives:      Admitting Physician:  Demetria Shannon MD  PCP: Gemma Garcia    Discharging Nurse: Dave Parham 23 Unit/Room#: No information available for this encounter. Discharging Unit Phone Number: [phone]      Emergency Contact:   Extended Emergency Contact Information  Primary Emergency Contact: Sherlyn Nichols, [address] [place] [address] Phone: [phone]  Relation: Brother/Sister  Preferred language: English    Past Surgical History:  Past Surgical History:   Procedure Laterality Date    EYE SURGERY Left 2018    had a tear in left eye- had laser surgery to repair    ROTATOR CUFF REPAIR Left     SKIN BIOPSY      negative    TUMOR REMOVAL      left foot    ULNAR TUNNEL RELEASE Right     pinched nerve- has a plate and 7 screws        Immunization History: There is no immunization history on file for this patient. Active Problems: There is no problem list on file for this patient. Isolation/Infection:   Isolation            No Isolation          Patient Infection Status       Infection Onset Added Last Indicated Last Indicated By Review Planned Expiration Resolved Resolved By    None active    Resolved    COVID-19 Rule Out 21 COVID-19 (Ordered)   21 Rule-Out Test Resulted            Nurse Assessment:  Last Vital Signs: There were no vitals taken for this visit.     Last documented pain score (0-10 scale):    Last Weight:   Wt Readings from Last 1 Encounters:   21 240 lb (108.9 kg)     Mental Status:  oriented and alert    IV Access:  - None    Nursing Mobility/ADLs:  Walking   Assisted  Transfer  Assisted  Bathing  Assisted  Dressing  Assisted  Toileting  Assisted  Feeding  Assisted  Med Admin  Assisted  Med Delivery   whole    Wound Care Documentation and Therapy: Elimination:  Continence:   · Bowel: Yes  · Bladder: Yes  Urinary Catheter: None   Colostomy/Ileostomy/Ileal Conduit: No       Date of Last BM: ***  No intake or output data in the 24 hours ending 09/21/21 0731  No intake/output data recorded. Safety Concerns:     None    Impairments/Disabilities:      None    Nutrition Therapy:  Current Nutrition Therapy:   - Oral Diet:  General    Routes of Feeding: Oral  Liquids: No Restrictions  Daily Fluid Restriction: no  Last Modified Barium Swallow with Video (Video Swallowing Test): not done    Treatments at the Time of Hospital Discharge:   Respiratory Treatments: ***  Oxygen Therapy:  is not on home oxygen therapy.   Ventilator:    - No ventilator support    Rehab Therapies: ***  Weight Bearing Status/Restrictions: No weight bearing restirctions  Other Medical Equipment (for information only, NOT a DME order):  walker  Other Treatments: ***    Patient's personal belongings (please select all that are sent with patient):  clothes, shoes    RN SIGNATURE:  Electronically signed by Park Stokes RN on 9/24/21 at 3:03 PM EDT    CASE MANAGEMENT/SOCIAL WORK SECTION    Inpatient Status Date: ***    Readmission Risk Assessment Score:  Readmission Risk              Risk of Unplanned Readmission:  0           Discharging to Facility/ Agency   · Name:   · Address:  · Phone:  · Fax:    Dialysis Facility (if applicable)   · Name:  · Address:  · Dialysis Schedule:  · Phone:  · Fax:    / signature: {Esignature:[id number]:::0}    PHYSICIAN SECTION    Prognosis: {Prognosis:5507649381:::0}    Condition at Discharge: [address] [place] [address] Patient Condition:862732329:::0}    Rehab Potential (if transferring to Rehab): {Prognosis:6373654344:::0}    Recommended Labs or Other Treatments After Discharge: ***    Physician Certification: I certify the above information and transfer of Coleman Ge  is necessary for the continuing treatment of the diagnosis listed and that she requires {Admit to Appropriate Level of Care:41868:::0} for {GREATER/LESS:846077419} 30 days.      Update Admission H&P: {CHP DME Changes in HandP:265700692:::0}    PHYSICIAN SIGNATURE:  Electronically signed by Roberto Goncalves MD on 9/21/21 at 7:31 AM EDT

## 2021-09-22 ENCOUNTER — APPOINTMENT (OUTPATIENT)
Dept: GENERAL RADIOLOGY | Age: 61
End: 2021-09-22
Attending: ORTHOPAEDIC SURGERY
Payer: MEDICAID

## 2021-09-22 LAB
-: NORMAL
ABSOLUTE EOS #: 0 K/UL (ref 0–0.4)
ABSOLUTE IMMATURE GRANULOCYTE: ABNORMAL K/UL (ref 0–0.3)
ABSOLUTE LYMPH #: 1.37 K/UL (ref 1–4.8)
ABSOLUTE MONO #: 0.98 K/UL (ref 0.1–1.3)
ALLEN TEST: ABNORMAL
ANION GAP SERPL CALCULATED.3IONS-SCNC: 11 MMOL/L (ref 9–17)
BASOPHILS # BLD: 0 % (ref 0–2)
BASOPHILS ABSOLUTE: 0 K/UL (ref 0–0.2)
BUN BLDV-MCNC: 12 MG/DL (ref 8–23)
BUN/CREAT BLD: ABNORMAL (ref 9–20)
CALCIUM SERPL-MCNC: 8.6 MG/DL (ref 8.6–10.4)
CARBOXYHEMOGLOBIN: 1.1 % (ref 0–5)
CHLORIDE BLD-SCNC: 105 MMOL/L (ref 98–107)
CO2: 24 MMOL/L (ref 20–31)
CREAT SERPL-MCNC: 0.62 MG/DL (ref 0.5–0.9)
DIFFERENTIAL TYPE: ABNORMAL
EOSINOPHILS RELATIVE PERCENT: 0 % (ref 0–4)
FIO2: ABNORMAL
GFR AFRICAN AMERICAN: >60 ML/MIN
GFR NON-AFRICAN AMERICAN: >60 ML/MIN
GFR SERPL CREATININE-BSD FRML MDRD: ABNORMAL ML/MIN/{1.73_M2}
GFR SERPL CREATININE-BSD FRML MDRD: ABNORMAL ML/MIN/{1.73_M2}
GLUCOSE BLD-MCNC: 147 MG/DL (ref 70–99)
HCO3 ARTERIAL: 27.5 MMOL/L (ref 22–26)
HCT VFR BLD CALC: 43.4 % (ref 36–46)
HCT VFR BLD CALC: 44.7 % (ref 36–46)
HEMOGLOBIN: 14.3 G/DL (ref 12–16)
HEMOGLOBIN: 14.8 G/DL (ref 12–16)
IMMATURE GRANULOCYTES: ABNORMAL %
LYMPHOCYTES # BLD: 7 % (ref 24–44)
MCH RBC QN AUTO: 30.2 PG (ref 26–34)
MCHC RBC AUTO-ENTMCNC: 33.2 G/DL (ref 31–37)
MCV RBC AUTO: 90.9 FL (ref 80–100)
METHEMOGLOBIN: 0.8 % (ref 0–1.9)
MODE: ABNORMAL
MONOCYTES # BLD: 5 % (ref 1–7)
MORPHOLOGY: NORMAL
NEGATIVE BASE EXCESS, ART: ABNORMAL MMOL/L (ref 0–2)
NOTIFICATION TIME: ABNORMAL
NOTIFICATION: ABNORMAL
NRBC AUTOMATED: ABNORMAL PER 100 WBC
O2 DEVICE/FLOW/%: ABNORMAL
O2 SAT, ARTERIAL: 94 % (ref 95–98)
OXYHEMOGLOBIN: ABNORMAL % (ref 95–98)
PATIENT TEMP: 37
PCO2 ARTERIAL: 41.3 MMHG (ref 35–45)
PCO2, ART, TEMP ADJ: ABNORMAL (ref 35–45)
PDW BLD-RTO: 13.8 % (ref 11.5–14.9)
PEEP/CPAP: ABNORMAL
PH ARTERIAL: 7.43 (ref 7.35–7.45)
PH, ART, TEMP ADJ: ABNORMAL (ref 7.35–7.45)
PLATELET # BLD: 262 K/UL (ref 150–450)
PLATELET ESTIMATE: ABNORMAL
PMV BLD AUTO: 7.5 FL (ref 6–12)
PO2 ARTERIAL: 67.7 MMHG (ref 80–100)
PO2, ART, TEMP ADJ: ABNORMAL MMHG (ref 80–100)
POSITIVE BASE EXCESS, ART: 3.3 MMOL/L (ref 0–2)
POTASSIUM SERPL-SCNC: 4.6 MMOL/L (ref 3.7–5.3)
PSV: ABNORMAL
PT. POSITION: ABNORMAL
RBC # BLD: 4.92 M/UL (ref 4–5.2)
RBC # BLD: ABNORMAL 10*6/UL
REASON FOR REJECTION: NORMAL
RESPIRATORY RATE: 24
SAMPLE SITE: ABNORMAL
SEG NEUTROPHILS: 88 % (ref 36–66)
SEGMENTED NEUTROPHILS ABSOLUTE COUNT: 17.25 K/UL (ref 1.3–9.1)
SET RATE: ABNORMAL
SODIUM BLD-SCNC: 140 MMOL/L (ref 135–144)
TEXT FOR RESPIRATORY: ABNORMAL
TOTAL HB: ABNORMAL G/DL (ref 12–16)
TOTAL RATE: ABNORMAL
VT: ABNORMAL
WBC # BLD: 19.6 K/UL (ref 3.5–11)
WBC # BLD: ABNORMAL 10*3/UL
ZZ NTE CLEAN UP: ORDERED TEST: NORMAL
ZZ NTE WITH NAME CLEAN UP: SPECIMEN SOURCE: NORMAL

## 2021-09-22 PROCEDURE — 6360000002 HC RX W HCPCS: Performed by: ORTHOPAEDIC SURGERY

## 2021-09-22 PROCEDURE — G0378 HOSPITAL OBSERVATION PER HR: HCPCS

## 2021-09-22 PROCEDURE — 6370000000 HC RX 637 (ALT 250 FOR IP): Performed by: ORTHOPAEDIC SURGERY

## 2021-09-22 PROCEDURE — 94660 CPAP INITIATION&MGMT: CPT

## 2021-09-22 PROCEDURE — 82805 BLOOD GASES W/O2 SATURATION: CPT

## 2021-09-22 PROCEDURE — 97166 OT EVAL MOD COMPLEX 45 MIN: CPT

## 2021-09-22 PROCEDURE — 85018 HEMOGLOBIN: CPT

## 2021-09-22 PROCEDURE — 85014 HEMATOCRIT: CPT

## 2021-09-22 PROCEDURE — 94761 N-INVAS EAR/PLS OXIMETRY MLT: CPT

## 2021-09-22 PROCEDURE — 96372 THER/PROPH/DIAG INJ SC/IM: CPT

## 2021-09-22 PROCEDURE — 36415 COLL VENOUS BLD VENIPUNCTURE: CPT

## 2021-09-22 PROCEDURE — 6370000000 HC RX 637 (ALT 250 FOR IP): Performed by: INTERNAL MEDICINE

## 2021-09-22 PROCEDURE — 36600 WITHDRAWAL OF ARTERIAL BLOOD: CPT

## 2021-09-22 PROCEDURE — 97530 THERAPEUTIC ACTIVITIES: CPT

## 2021-09-22 PROCEDURE — 85025 COMPLETE CBC W/AUTO DIFF WBC: CPT

## 2021-09-22 PROCEDURE — 96366 THER/PROPH/DIAG IV INF ADDON: CPT

## 2021-09-22 PROCEDURE — 6360000002 HC RX W HCPCS: Performed by: INTERNAL MEDICINE

## 2021-09-22 PROCEDURE — 71045 X-RAY EXAM CHEST 1 VIEW: CPT

## 2021-09-22 PROCEDURE — 2580000003 HC RX 258: Performed by: ORTHOPAEDIC SURGERY

## 2021-09-22 PROCEDURE — 80048 BASIC METABOLIC PNL TOTAL CA: CPT

## 2021-09-22 PROCEDURE — 94640 AIRWAY INHALATION TREATMENT: CPT

## 2021-09-22 PROCEDURE — 97110 THERAPEUTIC EXERCISES: CPT

## 2021-09-22 PROCEDURE — 97535 SELF CARE MNGMENT TRAINING: CPT

## 2021-09-22 PROCEDURE — 97162 PT EVAL MOD COMPLEX 30 MIN: CPT

## 2021-09-22 PROCEDURE — 99024 POSTOP FOLLOW-UP VISIT: CPT | Performed by: ORTHOPAEDIC SURGERY

## 2021-09-22 PROCEDURE — 2700000000 HC OXYGEN THERAPY PER DAY

## 2021-09-22 RX ORDER — HEPARIN SODIUM 5000 [USP'U]/ML
5000 INJECTION, SOLUTION INTRAVENOUS; SUBCUTANEOUS EVERY 8 HOURS SCHEDULED
Status: DISCONTINUED | OUTPATIENT
Start: 2021-09-22 | End: 2021-09-23

## 2021-09-22 RX ORDER — IPRATROPIUM BROMIDE AND ALBUTEROL SULFATE 2.5; .5 MG/3ML; MG/3ML
1 SOLUTION RESPIRATORY (INHALATION) 4 TIMES DAILY
Status: DISCONTINUED | OUTPATIENT
Start: 2021-09-22 | End: 2021-09-24 | Stop reason: HOSPADM

## 2021-09-22 RX ADMIN — IPRATROPIUM BROMIDE AND ALBUTEROL SULFATE 1 AMPULE: .5; 3 SOLUTION RESPIRATORY (INHALATION) at 15:38

## 2021-09-22 RX ADMIN — ASPIRIN 81 MG: 81 TABLET, COATED ORAL at 08:39

## 2021-09-22 RX ADMIN — ASPIRIN 81 MG: 81 TABLET, COATED ORAL at 19:54

## 2021-09-22 RX ADMIN — ACETAMINOPHEN 650 MG: 325 TABLET, FILM COATED ORAL at 08:39

## 2021-09-22 RX ADMIN — ACETAMINOPHEN 650 MG: 325 TABLET, FILM COATED ORAL at 14:18

## 2021-09-22 RX ADMIN — IPRATROPIUM BROMIDE AND ALBUTEROL SULFATE 1 AMPULE: .5; 3 SOLUTION RESPIRATORY (INHALATION) at 19:06

## 2021-09-22 RX ADMIN — ACETAMINOPHEN 650 MG: 325 TABLET, FILM COATED ORAL at 19:54

## 2021-09-22 RX ADMIN — HEPARIN SODIUM 5000 UNITS: 5000 INJECTION INTRAVENOUS; SUBCUTANEOUS at 14:17

## 2021-09-22 RX ADMIN — CEFAZOLIN 2000 MG: 10 INJECTION, POWDER, FOR SOLUTION INTRAVENOUS at 03:54

## 2021-09-22 RX ADMIN — HEPARIN SODIUM 5000 UNITS: 5000 INJECTION INTRAVENOUS; SUBCUTANEOUS at 22:10

## 2021-09-22 RX ADMIN — OXYCODONE HYDROCHLORIDE 5 MG: 5 TABLET ORAL at 11:53

## 2021-09-22 RX ADMIN — LISINOPRIL 20 MG: 20 TABLET ORAL at 08:40

## 2021-09-22 RX ADMIN — ACETAMINOPHEN 650 MG: 325 TABLET, FILM COATED ORAL at 02:42

## 2021-09-22 RX ADMIN — OXYCODONE HYDROCHLORIDE 5 MG: 5 TABLET ORAL at 18:59

## 2021-09-22 RX ADMIN — OXYCODONE HYDROCHLORIDE 5 MG: 5 TABLET ORAL at 23:03

## 2021-09-22 RX ADMIN — AMLODIPINE BESYLATE 10 MG: 10 TABLET ORAL at 08:40

## 2021-09-22 RX ADMIN — SODIUM CHLORIDE, POTASSIUM CHLORIDE, SODIUM LACTATE AND CALCIUM CHLORIDE: 600; 310; 30; 20 INJECTION, SOLUTION INTRAVENOUS at 11:56

## 2021-09-22 ASSESSMENT — PAIN DESCRIPTION - DESCRIPTORS
DESCRIPTORS: SHARP
DESCRIPTORS: SHARP

## 2021-09-22 ASSESSMENT — PAIN SCALES - GENERAL
PAINLEVEL_OUTOF10: 0
PAINLEVEL_OUTOF10: 4
PAINLEVEL_OUTOF10: 7
PAINLEVEL_OUTOF10: 7
PAINLEVEL_OUTOF10: 0
PAINLEVEL_OUTOF10: 1
PAINLEVEL_OUTOF10: 4
PAINLEVEL_OUTOF10: 4
PAINLEVEL_OUTOF10: 7
PAINLEVEL_OUTOF10: 7

## 2021-09-22 ASSESSMENT — PAIN DESCRIPTION - FREQUENCY
FREQUENCY: CONTINUOUS
FREQUENCY: CONTINUOUS

## 2021-09-22 ASSESSMENT — PAIN DESCRIPTION - PAIN TYPE
TYPE: SURGICAL PAIN
TYPE: SURGICAL PAIN

## 2021-09-22 ASSESSMENT — PAIN DESCRIPTION - ORIENTATION
ORIENTATION: RIGHT
ORIENTATION: RIGHT

## 2021-09-22 ASSESSMENT — PAIN DESCRIPTION - LOCATION
LOCATION: KNEE
LOCATION: KNEE

## 2021-09-22 ASSESSMENT — PAIN DESCRIPTION - ONSET: ONSET: ON-GOING

## 2021-09-22 NOTE — PROGRESS NOTES
Patient transferred per bed with belongings to 2105, hand off to RN at bedside. No complaints at this time.

## 2021-09-22 NOTE — PROGRESS NOTES
[address]   Occupational Therapy Evaluation  Date: 21  Patient Name: Mike Diaz       Room:   MRN: [id number]  Account: [de-identified]   : 1960  (64 y.o.) Gender: female     Discharge Recommendations:  Further Occupational Therapy is recommended upon facility discharge. Referring Practitioner: Bing Quiroz MD  Diagnosis: B TKA       Treatment Diagnosis: Impaired self-care status  Past Medical History:  has a past medical history of Anxiety, Arthritis, Depression, Hypertension, Knee pain, Murmur, PONV (postoperative nausea and vomiting), and Sleep apnea. Past Surgical History:   has a past surgical history that includes Rotator cuff repair (Left); tumor removal; Ulnar tunnel release (Right); skin biopsy; eye surgery (Left, 2018); and knee surgery (Bilateral, 2021).     Restrictions  Restrictions/Precautions: Weight Bearing, Fall Risk, Up as Tolerated  Implants present? : Metal implants (B TKA, Metal plate and screws in R UE)  Other position/activity restrictions: FWBAT RLE/LLE  Right Lower Extremity Weight Bearing: Weight Bearing As Tolerated  Left Lower Extremity Weight Bearing: Weight Bearing As Tolerated  Required Braces or Orthoses?: No     Vitals  Temp: 98.7 °F (37.1 °C)  Pulse: 87  Resp: 19  BP: (!) 141/109  Height: 5' 2\" (157.5 cm)  Weight: 240 lb (108.9 kg)  BMI (Calculated): 44  Oxygen Therapy  SpO2: 93 %  Pulse Oximeter Device Mode: Continuous  Pulse Oximeter Device Location: Finger, Right  O2 Device: Nasal cannula  Skin Assessment: Clean, dry, & intact  Skin Protection for O2 Device: No  FiO2 : 40 %  O2 Flow Rate (L/min): 2.5 L/min  Blood Gas  Performed?: Yes  Garcia's Test #1: Collateral flow confirmed  Site #1: Right Radial  Number of Attempts #1: 1  Pressure Held #1: Yes  Complications #1: None  Post-procedure #1: Standard  Specimen Status #1: Point of care  How Tolerated?: Tolerated well  Level of Consciousness: Alert (0)    Subjective  Comments: Okay for OT per RN. Pt pleasant and agreeable for today's session. Overall Orientation Status: Within Functional Limits  Vision  Vision: Impaired  Vision Exceptions: Wears glasses at all times  Hearing  Hearing: Within functional limits  Social/Functional History  Lives With: Son  Type of Home: Mobile home (Double wide)  Home Layout: One level  Home Access: Stairs to enter without rails, Ramped entrance (Bilateral hand rails on ramp, not by step)  Entrance Stairs - Number of Steps: 1 step in addition to ramp  Bathroom Shower/Tub: Tub/Shower unit  Bathroom Toilet: Standard (sink close by for support)  Bathroom Accessibility: Walker accessible  Home Equipment: 4 wheeled walker  Receives Help From: Family, Friend(s)  ADL Assistance: Independent  Homemaking Assistance: Independent  Homemaking Responsibilities: Yes  Ambulation Assistance: Independent  Transfer Assistance: Independent (used 4WW PRN)  Active : Yes  Mode of Transportation: SUV  IADL Comments: pt reports sleeping in flat bed and denies any recent falls  Additional Comments: Pt has 2 sons, 1 son recently broke his leg, but his GF is able to assist pt and provide 24/hr care (GF does not work). Pt's second son is due to come home Sunday or Monday but works full time. per pt has firned and family to provide transport and meals.   Pain Assessment  Pain Assessment: 0-10  Pain Level: 4  Pain Type: Surgical pain  Pain Location: Knee  Pain Orientation: Right  Pain Descriptors: Sharp (After ambulation, pt reported \"sharp\" pain was gone)  Pain Frequency: Continuous    Objective      Cognition  Overall Cognitive Status: WFL   Sensation  Overall Sensation Status: WFL (Pt denies)   ADL  Feeding: Independent  Grooming: Modified independent   UE Bathing: Contact guard assistance  LE Bathing: Maximum assistance  UE Dressing: Contact guard assistance  LE Dressing: Maximum assistance  Toileting: Dependent/Total  Additional Comments: ADL scores based on skilled observation and clinical reasoning, unless otherwise noted. Per PT, pt unable osorio socks, requiring total A to complete. Pt requested urgent need to go to the bathroom, dependent on perineal care due to pain in BLE. Pt was able to wash her face with setup. Assist required due to pain and limited mobility in BLE, reducing ability to complete meaningful activities    UE Function           LUE Strength  Gross LUE Strength: WFL  L Hand General: 4-/5  LUE Strength Comment: Overall 4-/5     LUE Tone: Normotonic     LUE AROM (degrees)  LUE AROM : WFL     Left Hand AROM (degrees)  Left Hand AROM: WFL  RUE Strength  Gross RUE Strength: WFL  R Hand General: 4-/5  RUE Strength Comment: Overall 4-/5      RUE Tone: Normotonic     RUE AROM (degrees)  RUE AROM : WFL     Right Hand AROM (degrees)  Right Hand AROM: WFL    Fine Motor Skills  Coordination  Movements Are Fluid And Coordinated: Yes (Pt reported B hands cramp intermittently)                           Mobility  Supine to Sit: Minimal assistance  Sit to Supine: Moderate assistance, 2 Person assistance       Balance  Sitting Balance: Supervision  Standing Balance: Contact guard assistance  Standing Balance  Time: ~1-2 minutes, ~2-3 minutes  Activity: self care, transfers, functional mobility  Comment: Bilateral support on RW during perineal care  Functional Mobility  Functional - Mobility Device: Rolling Walker  Activity: Other (To/from bedside commode)  Assist Level: Contact guard assistance  Functional Mobility Comments: 2 - 3 side steps; pt indicated feeling \"unsteady. \" Assist with line management  Bed mobility  Rolling to Right: Minimal assistance  Supine to Sit: Minimal assistance  Sit to Supine: Moderate assistance;2 Person assistance  Scooting: Contact guard assistance  Comment: Supine-to-sit: HOB elevated (~90-degrees) with use of hand rail, assist with lower body. Sit-to-supine:  Mod A x 2 (upper and lower body) due to increased fatigue Transfers  Stand Pivot Transfers: Contact guard assistance, 2 Person assistance  Sit to stand: Minimal assistance, 2 Person assistance  Stand to sit: Minimal assistance, 2 Person assistance  Transfer Comments: Max cues for hand placement for safety with no demonstration of understanding; increased time to complete  Toilet Transfers  Toilet - Technique: Stand pivot  Equipment Used: Extra wide bedside commode  Toilet Transfer: Minimal assistance, 2 Person assistance  Toilet Transfers Comments: cues for hand placement for safety; increased time to complete due to pain  Functional Activity Tolerance  Functional Activity Tolerance: Tolerates 30 min exercise with multiple rests   Assessment  Performance deficits / Impairments: Decreased functional mobility , Decreased ADL status, Decreased ROM, Decreased strength, Decreased safe awareness, Decreased endurance, Decreased balance, Decreased high-level IADLs  Treatment Diagnosis: Impaired self-care status  Prognosis: Good  Decision Making: Medium Complexity  REQUIRES OT FOLLOW UP: Yes  Discharge Recommendations: Patient would benefit from continued therapy after discharge, IP Rehab  Activity Tolerance: Patient limited by pain, Patient limited by fatigue         Functional Outcome Measures  AM-PAC Daily Activity Inpatient   How much help for putting on and taking off regular lower body clothing?: A Lot  How much help for Bathing?: A Lot  How much help for Toileting?: Total  How much help for putting on and taking off regular upper body clothing?: A Little  How much help for taking care of personal grooming?: A Little  How much help for eating meals?: None  -[facility] Inpatient Daily Activity Raw Score: 15  AM-PAC Inpatient ADL T-Scale Score : 34.69  ADL Inpatient CMS 0-100% Score: 56.46  ADL Inpatient CMS G-Code Modifier : CK       Goals  Patient Goals   Patient goals :  To return to independence  Short term goals  Time Frame for Short term goals: By discharge  Short term goal 1: Patient will complete upper body dressing/bathing with modified independence and lower body dressing/bathing with CGA, and good safety  Short term goal 2: Patient will perform transfers/functional mobility, with least restrictive device, with supervision and good safety  Short term goal 3: Patient will tolerate standing 6+ minutes, with least restrictive device, and good safety while engaged in self care/mobility  Short term goal 4: Patient will actively participate in 15+ minutes of therapeutic exercise/functional mobility to promote independence with self care and mobility    Plan  Safety Devices  Safety Devices in place: Yes  Type of devices:  All fall risk precautions in place, Call light within reach, Gait belt, Patient at risk for falls, Left in bed, Nurse notified     Plan  Times per week: 5-7  Current Treatment Recommendations: Strengthening, ROM, Balance Training, Functional Mobility Training, Endurance Training, Pain Management, Safety Education & Training, Patient/Caregiver Education & Training, Equipment Evaluation, Education, & procurement, Self-Care / ADL          OT Individual Minutes  Time In: 0926  Time Out: 1000  Minutes: 34    Electronically signed by Shai Dobbs OT on 9/22/21 at 10:53 AM EDT         09/22/21 1052   OT Individual Minutes   Time In 9144   Time Out 1000   Minutes 29

## 2021-09-22 NOTE — PROGRESS NOTES
Post Operative Progress Note    9/22/2021 2:01 PM  Info:   Admit Date: 9/21/2021  PCP: Narcisa Srinivasan      Medications:   Scheduled Meds:   ipratropium-albuterol  1 ampule Inhalation 4x daily    heparin (porcine)  5,000 Units SubCUTAneous 3 times per day    amLODIPine  10 mg Oral Daily    lisinopril  20 mg Oral Daily    sodium chloride flush  10 mL IntraVENous 2 times per day    acetaminophen  650 mg Oral Q6H    sennosides-docusate sodium  1 tablet Oral BID    aspirin  81 mg Oral BID     Continuous Infusions:   lactated ringers 50 mL/hr at 09/22/21 1225    sodium chloride       PRN Meds:sodium chloride flush, sodium chloride, oxyCODONE **OR** oxyCODONE, HYDROmorphone **OR** HYDROmorphone, magnesium hydroxide, ondansetron **OR** ondansetron    Diet:   Diet: ADULT DIET;  Regular    Subjective:   Systemic or Specific Complaints:Pain Control    Objective:     Patient Vitals for the past 24 hrs:   BP Temp Temp src Pulse Resp SpO2   09/22/21 1215 -- -- -- 90 26 --   09/22/21 1200 (!) 144/64 98.6 °F (37 °C) -- 85 21 --   09/22/21 1145 -- -- -- 73 21 --   09/22/21 1138 -- -- -- 76 22 93 %   09/22/21 1137 -- -- -- 70 22 92 %   09/22/21 1136 -- -- -- 74 23 92 %   09/22/21 1135 -- -- -- 75 23 93 %   09/22/21 1130 -- -- -- 77 22 --   09/22/21 1115 -- -- -- 78 20 --   09/22/21 1100 (!) 142/64 -- -- 80 18 --   09/22/21 1045 -- -- -- 84 19 --   09/22/21 1039 -- -- -- -- 21 93 %   09/22/21 1030 -- -- -- 92 18 --   09/22/21 1015 -- -- -- 80 21 --   09/22/21 1000 (!) 147/66 -- -- 82 24 --   09/22/21 0945 -- -- -- 94 13 --   09/22/21 0926 -- -- -- -- -- 93 %   09/22/21 0915 -- -- -- 94 28 93 %   09/22/21 0900 (!) 146/65 -- -- 84 21 --   09/22/21 0845 -- -- -- 84 20 --   09/22/21 0830 -- -- -- 84 22 --   09/22/21 0815 -- -- -- 87 19 --   09/22/21 0800 (!) 141/109 98.5 °F (36.9 °C) Oral 80 21 --   09/22/21 0745 -- -- -- 71 23 --   09/22/21 0730 -- -- -- 69 21 --   09/22/21 0725 -- -- -- -- 20 --   09/22/21 0715 -- -- -- 89 17 --   09/22/21 0700 129/70 -- -- 67 20 --   09/22/21 0615 -- -- -- 68 20 --   09/22/21 0613 -- -- -- 70 24 93 %   09/22/21 0600 127/69 -- -- 65 18 93 %   09/22/21 0500 137/71 -- -- 67 17 94 %   09/22/21 0400 (!) 150/85 -- -- 76 19 94 %   09/22/21 0318 -- -- -- -- 18 95 %   09/22/21 0315 -- -- -- -- 19 --   09/22/21 0300 (!) 171/94 -- -- 67 16 96 %   09/22/21 0200 130/73 -- -- 70 16 91 %   09/22/21 0100 (!) 156/91 -- -- 75 17 (!) 86 %   09/22/21 0000 (!) 140/76 98.7 °F (37.1 °C) Axillary 52 18 93 %   09/21/21 2342 -- -- -- -- 11 --   09/21/21 2300 127/77 -- -- 60 16 92 %   09/21/21 2244 -- -- -- 67 -- --   09/21/21 2200 (!) 155/83 -- -- 56 11 95 %   09/21/21 2100 (!) 152/87 -- -- 56 10 95 %   09/21/21 2030 (!) 155/81 98.1 °F (36.7 °C) Axillary 57 14 95 %   09/21/21 2020 -- -- -- -- 25 --   09/21/21 1940 117/73 -- -- 59 17 96 %   09/21/21 1930 117/69 98 °F (36.7 °C) -- 59 17 96 %   09/21/21 1920 122/73 -- -- 59 16 96 %   09/21/21 1910 111/69 -- -- 61 16 96 %   09/21/21 1900 117/74 -- -- 64 17 96 %   09/21/21 1850 120/70 -- -- 60 17 96 %   09/21/21 1840 123/80 -- -- 67 17 96 %   09/21/21 1830 124/72 -- -- 64 16 96 %   09/21/21 1820 136/82 -- -- 72 15 96 %   09/21/21 1810 117/77 -- -- 65 17 96 %   09/21/21 1800 129/77 -- -- 67 11 94 %   09/21/21 1750 (!) 143/106 -- -- 78 16 95 %   09/21/21 1740 128/85 -- -- 74 13 94 %   09/21/21 1730 132/80 -- -- 74 16 95 %   09/21/21 1720 139/82 -- -- 72 12 96 %   09/21/21 1710 (!) 123/99 -- -- 70 16 95 %   09/21/21 1700 131/75 -- -- 71 17 95 %   09/21/21 1650 118/71 -- -- 70 12 95 %   09/21/21 1640 136/86 -- -- 78 19 93 %   09/21/21 1630 139/81 -- -- 73 11 92 %   09/21/21 1620 132/79 -- -- 81 17 92 %   09/21/21 1610 128/74 96.9 °F (36.1 °C) -- 78 11 92 %   09/21/21 1600 (!) 141/79 -- -- 79 13 92 %   09/21/21 1550 (!) 141/83 -- -- 80 18 90 %   09/21/21 1540 138/78 -- -- 77 13 97 %   09/21/21 1530 127/75 98.2 °F (36.8 °C) Infrared 76 9 96 %   09/21/21 1520 125/80 -- -- 76 9 96 % 09/21/21 1510 125/72 -- -- 78 15 96 %   09/21/21 1500 128/70 -- -- 78 13 90 %   09/21/21 1452 -- -- -- -- 17 --   09/21/21 1450 128/73 -- -- 79 9 91 %   09/21/21 1440 (!) 151/82 -- -- 81 11 90 %   09/21/21 1430 (!) 151/83 -- -- 84 12 91 %   09/21/21 1420 (!) 159/93 97.1 °F (36.2 °C) Infrared 85 12 91 %   09/21/21 1410 (!) 146/76 -- -- 85 12 91 %         Wound: incision clean and dry without sign of infection   Motion: expected discomfort with range of motion in affected extremity   DVT Exam:  no evidence of DVT on physical examination         Data Review  CBC:   Recent Labs     09/22/21  0536 09/22/21  0949   WBC  --  19.6*   HGB 14.3 14.8   PLT  --  262           Assessment:   Patient is S/P bilateral Knee, Arthoplasty  Pain is not well controlled. She has no nausea and no vomiting.     Current activity is up with assistance  Obstructive sleep apnea  Pulmonary consult    Plan:      1:  Continue Physical Therapy  2:  Continue Deep venous thrombosis prophylaxis  3:  Continue Pain Control  4:  Discharge plans home vs SNF       Electronically signed by Dev Noe MD on 9/22/2021 at 2:01 PM

## 2021-09-22 NOTE — CARE COORDINATION
Joint Replacement Discharge Planning Note:    Admission Date:  9/21/2021 Fátima Longo is a 64 y.o.  female    Admitted for : Primary osteoarthritis of knees, bilateral [M17.0]    Met with:  Patient    PCP:  [address]  [address]    Current Residence/ Living Arrangements:  independently at home             Current Services PTA:  No    Is patient agreeable to [facility] [employer]: Yes    Is patient agreeable to outpatient physical therapy:  Yes    Freedom of choice provided: Yes         [address] [place] [email or web address] Agency/Outpatient Therapy chosen:  No    Potential Fort[facility] Alvarado needed: Yes    Current home DME:  [employer]    Ph[facility]acy:  [facility]    Does Patient want to use MEDS to BEDS?([facility] only) No    Transportation Provider:  Patient and Family                       Discharge Plan:   Patient intends to discharge to [facility] at this time - She prefers outpatient PT, however mentioned possibly needing to go somewhere - [facility] notified to follow patient for possible rehab    Patient needs a wheeled walker.      Anticipated discharge date 9/24/2021      Readmission Risk              Risk of Unplanned Readmission:  0           Electronically signed by: Flavio Hector RN on 9/22/2021 at 11:19 AM

## 2021-09-22 NOTE — PLAN OF CARE
Problem: Gas Exchange - Impaired:  Goal: Levels of oxygenation will improve  Description: Levels of oxygenation will improve  Outcome: Ongoing     Problem: Falls - Risk of:  Goal: Will remain free from falls  Description: Will remain free from falls  Outcome: Ongoing  Goal: Absence of physical injury  Description: Absence of physical injury  Outcome: Ongoing     Problem: Pain:  Goal: Pain level will decrease  Description: Pain level will decrease  Outcome: Ongoing  Goal: Control of acute pain  Description: Control of acute pain  Outcome: Ongoing  Goal: Control of chronic pain  Description: Control of chronic pain  Outcome: Ongoing     Problem: Skin Integrity:  Goal: Will show no infection signs and symptoms  Description: Will show no infection signs and symptoms  Outcome: Ongoing  Goal: Absence of new skin breakdown  Description: Absence of new skin breakdown  Outcome: Ongoing     Problem: Musculor/Skeletal Functional Status  Goal: Highest potential functional level  Outcome: Ongoing  Goal: Absence of falls  Outcome: Ongoing

## 2021-09-22 NOTE — ANESTHESIA POSTPROCEDURE EVALUATION
Department of Anesthesiology  Postprocedure Note    Patient: Susannah Noel  MRN: [id number]  YOB: 1960  Date of evaluation: 9/22/2021  Time:  1:53 PM     Procedure Summary     Date: 09/21/21 Room / Location: [facility] [address] / [facility]: CASIMIRO KRISHNAMURTHY    Anesthesia Start: 1027 Anesthesia Stop: 5397    Procedure: KNEE TOTAL ARTHROPLASTY BILATERAL (Bilateral Knee) Diagnosis: (DEGENERATIVE JOINT DISEASE BILATERAL KNEES)    Surgeons: Pardeep Haq MD Responsible Provider: Matt Basilio MD    Anesthesia Type: general, regional ASA Status: 3          Anesthesia Type: general, regional    Monico Phase I: Monico Score: 8    Monico Phase II:      Last vitals: Reviewed and per EMR flowsheets. Anesthesia Post Evaluation    Comments: POD #1. Patient seen at bedside. Monitored overnight in progressive care due to BIPAP requirement for likely undiagnosed sleep apnea. Saturations noted to be 85% while sleeping on nasal cannula. Patient easily arousable, with saturations increasing to 90s with deep breaths, and responds appropriately. Denies complications with anesthesia and says she just needs to sleep. Advised patient to follow up with pulmonologist for KEVIN.

## 2021-09-22 NOTE — FLOWSHEET NOTE
09/22/21 8994   Encounter Summary   Services provided to: Patient not available  (patient with therapy)

## 2021-09-22 NOTE — CONSULTS
[facility] PULMONARY & CRITICAL CARE SPECIALISTS   CONSULT NOTE:      DATE OF CONSULT 9/22/2021    REASON FOR CONSULTATION:  Hypercapnia and hypoxemia, critical care management      PCP LISSETTE FLORES     CHIEF COMPLAINT: Hypoxia and hypercapnia    HISTORY OF PRESENT ILLNESS:     Dante Malik is a morbidly obese white female with a history of obstructive sleep apnea (noncompliant with CPAP) who underwent bilateral total knee arthroplasty. In the PACU, the patient was felt to be lethargic/minimally reactive and anesthesia ordered BiPAP. An arterial blood gas showed acute hypercapnic and hypoxic respiratory failure with a pH of 7.297, a PCO2 of 56, and a PO2 of 97 but she was on 55% FiO2. The patient does not recall what happened the last thing she remembers is being wheeled to surgery. She was placed on BiPAP overnight. No ABG or labs ordered for the morning so I went ahead and did 1 and it showed that the pH had improved to 7.433 with a PCO2 of 41 and PO2 of 67. At that time, she was on just 1 L nasal cannula according to the blood gas. Currently, the patient is alert and oriented denies any fevers, chills, worsening cough or sputum production. She denies any chest pain. Her Covid swab prior to surgery was negative. Patient admits to smoking approximately a pack a day for roughly 25 years. She also states that she had a CPAP machine but lost her insurance and therefore does not use it anymore. When she had her insurance back, she did not go for repeat sleep study. She also has a nebulizer machine at home presumably for her \"COPD\" but has never had pulmonary function test and does not take it regularly.       ALLERGIES:  No Known Allergies    HOME MEDICATIONS:  Medications Prior to Admission: amLODIPine (NORVASC) 10 MG tablet, Take 10 mg by mouth daily  [DISCONTINUED] acetaminophen (TYLENOL 8 HOUR ARTHRITIS PAIN) 650 MG extended release tablet, Take 650 mg by mouth every 8 hours as needed for Pain  lisinopril (PRINIVIL;ZESTRIL) 20 MG tablet, Take 20 mg by mouth daily       PAST MEDICAL HISTORY:  Past Medical History:   Diagnosis Date    Anxiety     Arthritis     Depression     Hypertension     Knee pain     x10 years    Murmur     PONV (postoperative nausea and vomiting)     Sleep apnea     no machine       PAST SURGICAL HISTORY:  Past Surgical History:   Procedure Laterality Date    EYE SURGERY Left 2018    had a tear in left eye- had laser surgery to repair    KNEE SURGERY Bilateral 9/21/2021    KNEE TOTAL ARTHROPLASTY BILATERAL performed by Amalia Cantrell MD at [place] [address] Left     SKIN BIOPSY      negative    TUMOR REMOVAL      left foot    ULNAR TUNNEL RELEASE Right     pinched nerve- has a plate and 7 screws           SOCIAL HISTORY:  Social History     Socioeconomic History    Marital status:      Spouse name: Not on file    Number of children: Not on file    Years of education: Not on file    Highest education level: Not on file   Occupational History    Not on file   Tobacco Use    Smoking status: Current Every Day Smoker     Packs/day: 1.00    Smokeless tobacco: Never Used   Vaping Use    Vaping Use: Some days    Substances: Nicotine, Flavoring   Substance and Sexual Activity    Alcohol use: Not Currently    Drug use: Not Currently    Sexual activity: Not on file   Other Topics Concern    Not on file   Social History Narrative    Not on file     Social Determinants of Health     Financial Resource Strain:     Difficulty of Paying Living Expenses:    Food Insecurity:     Worried About Running Out of Food in the Last Year:     Clari of Food in the Last Year:    Transportation Needs:     Lack of Transportation (Medical):      Lack of Transportation (Non-Medical):    Physical Activity:     Days of Exercise per Week:     Minutes of Exercise per Session:    Stress:     Feeling of Stress :    Social Connections:     Frequency of Communication with Friends and Family:     Frequency of Social Gatherings with Friends and Family:     Attends [employer] Services:     Active Member of Clubs or Organizations:     Attends Club or Organization Meetings:     Marital Status:    Intimate Partner Violence:     Fear of Current or Ex-Partner:     Emotionally Abused:     Physically Abused:     Sexually Abused:        FAMILY HISTORY:  History reviewed. No pertinent family history. REVIEW OF SYSTEMS:  All other systems reviewed and are negative. PHYSICAL EXAM:  Vital Signs Blood pressure (!) 141/109, pulse 87, temperature 98.7 °F (37.1 °C), temperature source Axillary, resp. rate 19, height 5' 2\" (1.575 m), weight 240 lb (108.9 kg), SpO2 93 %. Oxygen Amount and Delivery: O2 Flow Rate (L/min): 5 L/min    Admission Weight Weight: 240 lb (108.9 kg)    General Appearance   [place] obese middle-aged female no acute respite distress  Head  Normocephalic, without obvious abnormality, atraumatic    Eyes  conjunctivae/corneas clear. PERRL, EOM's intact. Fundi benign. ENT macroglossia low overhanging soft palate neck size 17 inches in diameter  Neck  no adenopathy, no carotid bruit, no JVD, supple, symmetrical, trachea midline and thyroid not enlarged, symmetric, no tenderness/mass/nodules  Lungs diminished poor air movement  Heart: regular rate and rhythm, S1, S2 normal, no murmur, click, rub or gallop  Abdomen  soft, non-tender; bowel sounds normal; no masses,  no organomegaly  Extremities  Lower extremities are wrapped  Skin  Skin color, texture, turgor normal. No rashes or lesions  Neurologic: Alert and oriented X 3, normal strength and tone.          Imaging      Lab Review  CBC     Lab Results   Component Value Date    WBC 19.6 09/22/2021    RBC 4.92 09/22/2021    HGB 14.8 09/22/2021    HCT 44.7 09/22/2021     09/22/2021    MCV 90.9 09/22/2021    MCH 30.2 09/22/2021    MCHC 33.2 09/22/2021    RDW 13.8 09/22/2021    LYMPHOPCT 7 09/22/2021 MONOPCT 5 09/22/2021    BASOPCT 0 09/22/2021    MONOSABS 0.98 09/22/2021    LYMPHSABS 1.37 09/22/2021    EOSABS 0.00 09/22/2021    BASOSABS 0.00 09/22/2021    DIFFTYPE NOT REPORTED 09/22/2021       BMP   Lab Results   Component Value Date     09/14/2021    K 4.3 09/14/2021     09/14/2021    CO2 24 09/14/2021    BUN 12 09/14/2021    CREATININE 0.57 09/14/2021    GLUCOSE 91 09/14/2021    CALCIUM 9.5 09/14/2021       LFTS  No results found for: ALKPHOS, ALT, AST, PROT, BILITOT, BILIDIR, IBILI, LABALBU    INR  No results for input(s): PROTIME, INR in the last 72 hours. APTT  No results for input(s): APTT in the last 72 hours. Lactic Acid  No results found for: LACTA     PRO-BNP   No results for input(s): PROBNP in the last 72 hours.         ABGs:   Lab Results   Component Value Date    PHART 7.297 09/21/2021    PO2ART 91.4 09/21/2021    VNE4YVW 54.6 09/21/2021       Lab Results   Component Value Date    MODE 14/8 09/21/2021         Impression    Acute hypoxic and hypercapnic respiratory failure, improved with BiPAP; most likely this was due to anesthesia was given for the procedure  Clinical diagnosis of COPD given her history  KEVIN, clinically severe  Morbid obesity  History of tobacco use  Leukocytosis     Plan:      Do not feel that she is stable for discharge today  We will place her on nebulizer treatments 4 times a day, does not need steroids  Continue BiPAP while in the hospital when she is sleeping  Told her to use her incentive spirometer which she is not too happy about  Smoking cessation  She needs outpatient PFTs  Outpatient sleep study  We will decrease IV fluids since she is eating and drinking  Will recheck labs including CBC and BMP tomorrow  Unclear to me why her white count should be up other than stress related  Her chest x-ray is fairly clear may have some mild microatelectasis  Wean oxygen off keep saturation greater than 88%  DVT prophylaxis with subcu heparin  Transfer to stepdown

## 2021-09-22 NOTE — PROGRESS NOTES
Physical Therapy    Facility/Department: [facility] ICU  Initial Assessment    NAME: Hannah Steele  : 1960  MRN: [id number]    Date of Service: 2021    Discharge Recommendations: The patient would benefit from an intensive level of therapy after discharge from the facility. They should be able to tolerate 3-hours of Combined OT/PT/ST over 5 days/week or at least 900 minutes of  Combined Therapy over 7 days. Patient would benefit from continued therapy after discharge   PT Equipment Recommendations  Equipment Needed: Yes  Mobility Devices: Walker (TBD)  Walker: Rolling (TBD)  Other: Need for RW TBD, pt has a 4WW but may require a more stable AD upon d/c    Assessment   Body structures, Functions, Activity limitations: Decreased functional mobility ; Decreased ROM; Decreased strength;Decreased endurance;Decreased balance; Increased pain  Assessment: pt requires Sue - ModAx2 for bed mobility, MinAx2 for transfers with RW, and CGAx1 for 4 side steps and 2 steps forward. Stairs assessment deferred this date d/t inc fatigue and pain with level ground mobility. pt requires significant assistance at this time with limited assistance that can be provided at home. Pt is not safe to return home at their current functional level. Recommending continued PT services in ARU to address impaired balance, ROM, strength, and functional mobility and to progress pt towards prior level of independence to allow a safe return home. Treatment Diagnosis: impaired functional mobility secondary to Bilateral TKA procedure  Specific instructions for Next Treatment: Gait with RW, Strengthening/ROM Exercises, Review HEP, Assess Stair mobility (pt has ramp and 1 step to enter house)  Prognosis: Fair  Decision Making: Medium Complexity  History: H/O HTN, Sleep apnea, current smoker, depression/anxiety. Exam: ROM, MMT, Balance, and functional mobility assessments.   Clinical Presentation: pt presents with BLE deficits in ROM, strength, and increased pain. Impaired balance and mobility requiring 1-2 person assist for mobility at this time. REQUIRES PT FOLLOW UP: Yes  Activity Tolerance  Activity Tolerance: Patient limited by pain; Patient limited by endurance  Activity Tolerance: seated rest breaks provided PRN. SpO2 levels remain above 90 throughout most of the session on room air. Post mobility pt desats to 88% and is placed back on O2 @ 1L/min and O2 saturation returns to 93% while resting in bed. Patient Diagnosis(es): The encounter diagnosis was Primary osteoarthritis of both knees. has a past medical history of Anxiety, Arthritis, Depression, Hypertension, Knee pain, Murmur, PONV (postoperative nausea and vomiting), and Sleep apnea. has a past surgical history that includes Rotator cuff repair (Left); tumor removal; Ulnar tunnel release (Right); skin biopsy; eye surgery (Left, 2018); and knee surgery (Bilateral, 9/21/2021). Restrictions  Restrictions/Precautions  Restrictions/Precautions: Weight Bearing, Fall Risk, Up as Tolerated  Required Braces or Orthoses?: No  Implants present? : Metal implants (B TKA, Metal plate and screws in R UE)  Lower Extremity Weight Bearing Restrictions  Right Lower Extremity Weight Bearing: Weight Bearing As Tolerated  Left Lower Extremity Weight Bearing: Weight Bearing As Tolerated  Position Activity Restriction  Other position/activity restrictions: FWBAT RLE/LLE  Vision/Hearing  Vision: Impaired  Vision Exceptions: Wears glasses at all times  Hearing: Within functional limits     Subjective  General  Chart Reviewed: Yes  Patient assessed for rehabilitation services?: Yes  Additional Pertinent Hx: pt is a 64 y.o. woman recently admitted to hospital for Bilateral TKA procedure on 9/21/21 by Dr. Keerthi Grande. Pt with initial diagnosis of Bilateral knee Degenerative Joint Disease and H/O osteoarthritis, sleep apnea (no machine), and HTN.   Response To Previous Treatment: Not applicable  Family / Caregiver Present: No  Referring Practitioner: Dr. Ru Carver  Referral Date : 09/21/21  Diagnosis: Primary Osteoarthritis of bilateral knees, s/p Bilateral TKAs on 9/21/21 by Dr. Nance Rho: Within Functional Limits  General Comment  Comments: OK per nurse Amanda Nation to proceed with PT eval  Subjective  Subjective: pt is pleasant and agreeable to therapy assessment. States \"I really need to go to the bathroom\"  Pain Screening  Patient Currently in Pain: Yes  Pain Assessment  Pain Assessment: 0-10  Pain Level: 4 (pt states that L surgical Knee has no pain currently)  Pain Type: Surgical pain  Pain Location: Knee  Pain Orientation: Right  Pain Descriptors: Sharp  Pain Frequency: Continuous  Pain Onset: On-going  Vital Signs  Patient Currently in Pain: Yes  Oxygen Therapy  SpO2: 93 %  Pulse Oximeter Device Mode: Continuous  Pulse Oximeter Device Location: Finger;Right  O2 Device: Nasal cannula  O2 Flow Rate (L/min): 2.5 L/min  Patient Observation  Observations: pt resting supine with HOB elevated, continuous BP, Telemetry, SpO2 monitoring, Bilateral Ace wraps on BLEs.  O2 2.5 L/min via nasal cannula       Orientation  Orientation  Overall Orientation Status: Within Functional Limits  Social/Functional History  Social/Functional History  Lives With: Son  Type of Home: Mobile home (Double wide)  Home Layout: One level  Home Access: Stairs to enter without rails, Ramped entrance (Bilateral hand rails on ramp, not by step)  Entrance Stairs - Number of Steps: 1 step in addition to ramp  Bathroom Shower/Tub: Tub/Shower unit  [employer]: Standard (sink close by for support)  Bathroom Accessibility: Walker accessible  Home Equipment: 4 wheeled walker  Receives Help From: Family, Friend(s)  ADL Assistance: Independent  Homemaking Assistance: Independent  Homemaking Responsibilities: Yes  Ambulation Assistance: Independent  Transfer Assistance: Independent (used 4WW PRN)  Active : Yes  Mode of Transportation: [place]  IADL Comments: pt reports sleeping in flat bed and denies any recent falls  Additional Comments: Pt has 2 sons, 1 son recently broke his leg, but his GF is able to assist pt and provide 24/hr care (GF does not work). Pt's second son is due to come home Sunday or Monday but works full time. per pt has firned and family to provide transport and meals. Cognition        Objective    PROM RLE (degrees)  RLE PROM: Exceptions  R Knee Flexion 0-145: 4- 75  R Knee Extension 0: lacking 4 degrees from 0  AROM RLE (degrees)  RLE AROM: Exceptions  R Knee Flexion 0-145: 4- 75  R Knee Extension 0: lacking 4 degrees from 0  PROM LLE (degrees)  LLE PROM: Exceptions  L Knee Flexion 0-145: 5- 63  L Knee Extension 0: lacking 5 degrees from 0  AROM LLE (degrees)  LLE AROM : Exceptions  L Knee Flexion 0-145: 8- 60  L Knee Extension 0: lacking 8 degrees from 0  PROM RUE (degrees)  RUE General PROM:  (See OT assessments)  AROM RUE (degrees)  RUE General AROM:  (See OT assessments)  PROM LUE (degrees)  LUE General PROM:  (See OT assessments)  AROM LUE (degrees)  LUE General AROM:  (See OT assessments)  Strength RLE  Comment: pt with at least 3/5 grossly observed during bed mobility, standing, and transfers  Strength LLE  Comment: pt with at least 3/5 grossly observed during bed mobility, standing, and transfers  Strength RUE  Comment:  (See OT assessments)  Strength LUE  Comment:  (See OT assessments)     Sensation  Overall Sensation Status: WFL (pt denies N/T)  Bed mobility  Scooting: Contact guard assistance  Transfers  Sit to Stand: Minimal Assistance;2 Person Assistance (MinAx2)  Stand to sit: Contact guard assistance  Bed to Chair: 2 Person Assistance (CGAx2 for line/lead management and safety)  Stand Pivot Transfers: 2 Person Assistance (CGAx2)  Comment: All transfers performed with RW. 2 person assist (MinAx2 - CGAx2). VCs for hands placement. Ed on RW use vs [id number] as RW offers more stability.   Ambulation  Ambulation?: Yes  WB Status: FWBAT BLEs  Ambulation 1  Surface: level tile  Device: Rolling Walker  Assistance: Contact guard assistance  Quality of Gait: inc pain with WB, antalgic gait, Wide LUX, pt moves slowly/cautiously and barely clears BLEs from floor  Gait Deviations: Slow Janet; Increased LUX; Decreased step length;Decreased step height  Distance: 4 side steps; 2 steps forward  Comments: pt amb short distance within room using RW and CGA. pt with forward flexed trunk and heavy reliance of BUEs on RW to support self during gait. pt becomes SOB during task.   Stairs/Curb  Stairs?: No (unsafe to assess currently; pt fatigued post mobility )     Balance  Posture: Fair  Sitting - Static: Fair  Sitting - Dynamic: Fair  Standing - Static: Fair (with RW)  Standing - Dynamic: Fair (with RW)  Comments: standing balance assessed with RW  Exercises  Quad Sets: x5 R/L  Heelslides: x5 R/L  Gluteal Sets: x10  Ankle Pumps: x10 R/L  Comments: Ed on importance of performing exercises throughout the day, Handouts provided     Plan   Plan  Times per week:  (once per day in ICU, once out of ICU proceed with BID tx's)  Times per day:  (once per day in ICU, once out of ICU proceed with BID tx's)  Specific instructions for Next Treatment: Gait with RW, Strengthening/ROM Exercises, Review HEP, Assess Stair mobility (pt has ramp and 1 step to enter house)  Current Treatment Recommendations: Strengthening, ROM, Balance Training, Functional Mobility Training, Gait Training, Stair training, Pain Management  Safety Devices  Type of devices: Call light within reach, Gait belt, Patient at risk for falls, Left in bed, Nurse notified (Nurse Juju Quintanilla)  Restraints  Initially in place: No    G-Code       OutComes Score    AM-PAC Score  AM-PAC Inpatient Mobility Raw Score : 9 (09/22/21 0915)  AM-PAC Inpatient T-Scale Score : 30.55 (09/22/21 0915)  Mobility Inpatient CMS 0-100% Score: 81.38 (09/22/21 0915)  Mobility Inpatient CMS G-Code Modifier : CM (09/22/21 0915) Goals  Short term goals  Time Frame for Short term goals: 7 visits  Short term goal 1: pt to improve Bilat knee flexion AROM by 20 degrees R knee and 30 degrees L knee to improve overall functional mobility during transfers, gait, stairs   Short term goal 2: pt to improve BLE strength to 4/5 to improve strength and safety during functional transfers and mobility  Short term goal 3: pt to improve standing balance with RW from fair to fair+ to improve safety with functional mobility   Short term goal 4: pt to ambulate 22' with RW and CGA to improve mobility for household distances  Short term goal 5: pt to improve Bilat knee extension ROM to 0 degrees to improve joint mechanics for safe amb and transfers  Short term goal 6: Continue to assess mobility, plan to assess stair mobility (pt has ramp and 1 step to enter house)  Patient Goals   Patient goals : pt does not state goals       Therapy Time   Individual Concurrent Group Co-treatment   Time In 0915         Time Out 1019         Minutes 64         Timed Code Treatment Minutes: 89 Evan Cotto, PT

## 2021-09-22 NOTE — PLAN OF CARE
Problem: Gas Exchange - Impaired:  Goal: Levels of oxygenation will improve  Description: Levels of oxygenation will improve  Outcome: Ongoing  Note: Pt on bipap ,sat 94     Problem: Falls - Risk of:  Goal: Will remain free from falls  Description: Will remain free from falls  Outcome: Ongoing  Note: Bed alarm is on  Goal: Absence of physical injury  Description: Absence of physical injury  Outcome: Ongoing     Problem: Pain:  Goal: Pain level will decrease  Description: Pain level will decrease  Outcome: Ongoing  Goal: Control of acute pain  Description: Control of acute pain  Outcome: Ongoing  Note: Ass pain,  Goal: Control of chronic pain  Description: Control of chronic pain  Outcome: Ongoing

## 2021-09-23 PROBLEM — E66.01 MORBID OBESITY (HCC): Chronic | Status: ACTIVE | Noted: 2021-09-23

## 2021-09-23 PROBLEM — J96.01 ACUTE RESPIRATORY FAILURE WITH HYPOXIA AND HYPERCAPNIA (HCC): Status: ACTIVE | Noted: 2021-09-23

## 2021-09-23 PROBLEM — J44.9 COPD (CHRONIC OBSTRUCTIVE PULMONARY DISEASE) (HCC): Chronic | Status: ACTIVE | Noted: 2021-09-23

## 2021-09-23 PROBLEM — J96.02 ACUTE RESPIRATORY FAILURE WITH HYPOXIA AND HYPERCAPNIA (HCC): Status: ACTIVE | Noted: 2021-09-23

## 2021-09-23 PROBLEM — Z87.891 HISTORY OF TOBACCO USE: Chronic | Status: ACTIVE | Noted: 2021-09-23

## 2021-09-23 PROBLEM — G47.33 OSA (OBSTRUCTIVE SLEEP APNEA): Chronic | Status: ACTIVE | Noted: 2021-09-23

## 2021-09-23 LAB
ABSOLUTE EOS #: 0 K/UL (ref 0–0.4)
ABSOLUTE IMMATURE GRANULOCYTE: ABNORMAL K/UL (ref 0–0.3)
ABSOLUTE LYMPH #: 2.2 K/UL (ref 1–4.8)
ABSOLUTE MONO #: 1 K/UL (ref 0.1–1.3)
ANION GAP SERPL CALCULATED.3IONS-SCNC: 6 MMOL/L (ref 9–17)
BASOPHILS # BLD: 0 % (ref 0–2)
BASOPHILS ABSOLUTE: 0 K/UL (ref 0–0.2)
BUN BLDV-MCNC: 9 MG/DL (ref 8–23)
BUN/CREAT BLD: ABNORMAL (ref 9–20)
CALCIUM SERPL-MCNC: 8.2 MG/DL (ref 8.6–10.4)
CHLORIDE BLD-SCNC: 106 MMOL/L (ref 98–107)
CO2: 25 MMOL/L (ref 20–31)
CREAT SERPL-MCNC: 0.54 MG/DL (ref 0.5–0.9)
DIFFERENTIAL TYPE: ABNORMAL
EOSINOPHILS RELATIVE PERCENT: 0 % (ref 0–4)
GFR AFRICAN AMERICAN: >60 ML/MIN
GFR NON-AFRICAN AMERICAN: >60 ML/MIN
GFR SERPL CREATININE-BSD FRML MDRD: ABNORMAL ML/MIN/{1.73_M2}
GFR SERPL CREATININE-BSD FRML MDRD: ABNORMAL ML/MIN/{1.73_M2}
GLUCOSE BLD-MCNC: 131 MG/DL (ref 70–99)
HCT VFR BLD CALC: 39.8 % (ref 36–46)
HEMOGLOBIN: 13 G/DL (ref 12–16)
IMMATURE GRANULOCYTES: ABNORMAL %
LYMPHOCYTES # BLD: 19 % (ref 24–44)
MCH RBC QN AUTO: 29.7 PG (ref 26–34)
MCHC RBC AUTO-ENTMCNC: 32.8 G/DL (ref 31–37)
MCV RBC AUTO: 90.6 FL (ref 80–100)
MONOCYTES # BLD: 9 % (ref 1–7)
NRBC AUTOMATED: ABNORMAL PER 100 WBC
PDW BLD-RTO: 13.9 % (ref 11.5–14.9)
PLATELET # BLD: 196 K/UL (ref 150–450)
PLATELET ESTIMATE: ABNORMAL
PMV BLD AUTO: 7.4 FL (ref 6–12)
POTASSIUM SERPL-SCNC: 4.2 MMOL/L (ref 3.7–5.3)
RBC # BLD: 4.39 M/UL (ref 4–5.2)
RBC # BLD: ABNORMAL 10*6/UL
SEG NEUTROPHILS: 72 % (ref 36–66)
SEGMENTED NEUTROPHILS ABSOLUTE COUNT: 8.3 K/UL (ref 1.3–9.1)
SODIUM BLD-SCNC: 137 MMOL/L (ref 135–144)
WBC # BLD: 11.5 K/UL (ref 3.5–11)
WBC # BLD: ABNORMAL 10*3/UL

## 2021-09-23 PROCEDURE — 6370000000 HC RX 637 (ALT 250 FOR IP): Performed by: ORTHOPAEDIC SURGERY

## 2021-09-23 PROCEDURE — 97535 SELF CARE MNGMENT TRAINING: CPT

## 2021-09-23 PROCEDURE — 80048 BASIC METABOLIC PNL TOTAL CA: CPT

## 2021-09-23 PROCEDURE — 99024 POSTOP FOLLOW-UP VISIT: CPT | Performed by: ORTHOPAEDIC SURGERY

## 2021-09-23 PROCEDURE — 97110 THERAPEUTIC EXERCISES: CPT

## 2021-09-23 PROCEDURE — G0378 HOSPITAL OBSERVATION PER HR: HCPCS

## 2021-09-23 PROCEDURE — 6370000000 HC RX 637 (ALT 250 FOR IP): Performed by: INTERNAL MEDICINE

## 2021-09-23 PROCEDURE — 97530 THERAPEUTIC ACTIVITIES: CPT

## 2021-09-23 PROCEDURE — 99253 IP/OBS CNSLTJ NEW/EST LOW 45: CPT | Performed by: STUDENT IN AN ORGANIZED HEALTH CARE EDUCATION/TRAINING PROGRAM

## 2021-09-23 PROCEDURE — 36415 COLL VENOUS BLD VENIPUNCTURE: CPT

## 2021-09-23 PROCEDURE — 94660 CPAP INITIATION&MGMT: CPT

## 2021-09-23 PROCEDURE — 94640 AIRWAY INHALATION TREATMENT: CPT

## 2021-09-23 PROCEDURE — 2700000000 HC OXYGEN THERAPY PER DAY

## 2021-09-23 PROCEDURE — 94761 N-INVAS EAR/PLS OXIMETRY MLT: CPT

## 2021-09-23 PROCEDURE — 6360000002 HC RX W HCPCS: Performed by: INTERNAL MEDICINE

## 2021-09-23 PROCEDURE — 85025 COMPLETE CBC W/AUTO DIFF WBC: CPT

## 2021-09-23 PROCEDURE — 97116 GAIT TRAINING THERAPY: CPT

## 2021-09-23 PROCEDURE — 96372 THER/PROPH/DIAG INJ SC/IM: CPT

## 2021-09-23 RX ADMIN — HEPARIN SODIUM 5000 UNITS: 5000 INJECTION INTRAVENOUS; SUBCUTANEOUS at 05:30

## 2021-09-23 RX ADMIN — IPRATROPIUM BROMIDE AND ALBUTEROL SULFATE 1 AMPULE: .5; 3 SOLUTION RESPIRATORY (INHALATION) at 11:02

## 2021-09-23 RX ADMIN — OXYCODONE HYDROCHLORIDE 10 MG: 10 TABLET ORAL at 08:26

## 2021-09-23 RX ADMIN — AMLODIPINE BESYLATE 10 MG: 10 TABLET ORAL at 08:08

## 2021-09-23 RX ADMIN — ASPIRIN 81 MG: 81 TABLET, COATED ORAL at 08:08

## 2021-09-23 RX ADMIN — OXYCODONE HYDROCHLORIDE 5 MG: 5 TABLET ORAL at 12:32

## 2021-09-23 RX ADMIN — OXYCODONE HYDROCHLORIDE 10 MG: 10 TABLET ORAL at 03:55

## 2021-09-23 RX ADMIN — IPRATROPIUM BROMIDE AND ALBUTEROL SULFATE 1 AMPULE: .5; 3 SOLUTION RESPIRATORY (INHALATION) at 07:34

## 2021-09-23 RX ADMIN — IPRATROPIUM BROMIDE AND ALBUTEROL SULFATE 1 AMPULE: .5; 3 SOLUTION RESPIRATORY (INHALATION) at 19:01

## 2021-09-23 RX ADMIN — ACETAMINOPHEN 650 MG: 325 TABLET, FILM COATED ORAL at 08:08

## 2021-09-23 RX ADMIN — ACETAMINOPHEN 650 MG: 325 TABLET, FILM COATED ORAL at 02:25

## 2021-09-23 RX ADMIN — IPRATROPIUM BROMIDE AND ALBUTEROL SULFATE 1 AMPULE: .5; 3 SOLUTION RESPIRATORY (INHALATION) at 15:47

## 2021-09-23 RX ADMIN — LISINOPRIL 20 MG: 20 TABLET ORAL at 08:08

## 2021-09-23 RX ADMIN — ACETAMINOPHEN 650 MG: 325 TABLET, FILM COATED ORAL at 20:29

## 2021-09-23 RX ADMIN — OXYCODONE HYDROCHLORIDE 5 MG: 5 TABLET ORAL at 17:17

## 2021-09-23 RX ADMIN — ACETAMINOPHEN 650 MG: 325 TABLET, FILM COATED ORAL at 14:45

## 2021-09-23 RX ADMIN — ASPIRIN 81 MG: 81 TABLET, COATED ORAL at 20:29

## 2021-09-23 ASSESSMENT — PAIN SCALES - GENERAL
PAINLEVEL_OUTOF10: 4
PAINLEVEL_OUTOF10: 7
PAINLEVEL_OUTOF10: 4
PAINLEVEL_OUTOF10: 2
PAINLEVEL_OUTOF10: 4
PAINLEVEL_OUTOF10: 3
PAINLEVEL_OUTOF10: 7
PAINLEVEL_OUTOF10: 7
PAINLEVEL_OUTOF10: 3
PAINLEVEL_OUTOF10: 4
PAINLEVEL_OUTOF10: 7
PAINLEVEL_OUTOF10: 4
PAINLEVEL_OUTOF10: 5

## 2021-09-23 ASSESSMENT — PAIN DESCRIPTION - PAIN TYPE
TYPE: SURGICAL PAIN

## 2021-09-23 ASSESSMENT — ENCOUNTER SYMPTOMS
SHORTNESS OF BREATH: 1
ABDOMINAL PAIN: 0

## 2021-09-23 ASSESSMENT — PAIN DESCRIPTION - ORIENTATION
ORIENTATION: LEFT;RIGHT
ORIENTATION: RIGHT
ORIENTATION_2: LEFT
ORIENTATION: LEFT;RIGHT

## 2021-09-23 ASSESSMENT — PAIN DESCRIPTION - FREQUENCY
FREQUENCY: CONTINUOUS
FREQUENCY: CONTINUOUS

## 2021-09-23 ASSESSMENT — PAIN DESCRIPTION - DESCRIPTORS
DESCRIPTORS: SHARP
DESCRIPTORS: SHARP

## 2021-09-23 ASSESSMENT — PAIN DESCRIPTION - LOCATION
LOCATION: KNEE

## 2021-09-23 ASSESSMENT — PAIN DESCRIPTION - INTENSITY: RATING_2: 7

## 2021-09-23 ASSESSMENT — PAIN DESCRIPTION - ONSET: ONSET: ON-GOING

## 2021-09-23 NOTE — PROGRESS NOTES
NONINVASIVE VENTILATION    PROVIDE OPTIMAL VENTILATION/ACCEPTABLE SPO2   IMPLEMENT NONINVASIVE VENTILATION PROTOCOL   MAINTAIN ACCEPTABLE SPO2   ASSESS SKIN INTEGRITY/BREAKDOWN SCORE   PATIENT EDUCATION AS NEEDED   BIPAP AS NEEDED        Pt refusing BIPAP at this time.   Resting on 3lpm NC 90%

## 2021-09-23 NOTE — PROGRESS NOTES
Dr Ashanti Monteroy ok with pt transfer to med surg. Ok to remove ace wraps   . Awaiting dr Lucy Mccall to see pt today to ensure he is also ok with med surg.  -420 pm dr Lucy Mccall is ok with pt transfer to med surg with heart monitor on.  [facility] notified bed needed

## 2021-09-23 NOTE — PROGRESS NOTES
Pulmonary Progress Note  [facility]      Patient - Ambar Burgos,  Age - 64 y.o.    - 1960      Room Number - 2105/2105-01   N -  [id number]   [facility]  Date of Admission -  2021  8:12 AM        Consulting Service/Physician   Consulting - Sharmila Busch MD  Primary Care Physician - [facility]     SUBJECTIVE   Did not use her BiPAP, feels exhausted after therapy    OBJECTIVE   VITALS    height is 5' 2\" (1.575 m) and weight is 258 lb 2.5 oz (117.1 kg). Her oral temperature is 98.1 °F (36.7 °C). Her blood pressure is 121/71 and her pulse is 79. Her respiration is 18 and oxygen saturation is 94%. Body mass index is 47.22 kg/m². Temperature Range: Temp: 98.1 °F (36.7 °C) Temp  Av.4 °F (36.9 °C)  Min: 98.1 °F (36.7 °C)  Max: 98.6 °F (37 °C)  BP Range:  Systolic (35GIX), DVT:584 , Min:121 , AQP:846     Diastolic (87CEH), VAC:79, Min:60, Max:71    Pulse Range: Pulse  Av.5  Min: 77  Max: 89  Respiration Range: Resp  Av.4  Min: 18  Max: 20  Current Pulse Ox[de-identified]  SpO2: 94 %  24HR Pulse Ox Range:  SpO2  Av %  Min: 90 %  Max: 94 %  Oxygen Amount and Delivery: O2 Flow Rate (L/min): 4 L/min    Wt Readings from Last 3 Encounters:   21 258 lb 2.5 oz (117.1 kg)   21 240 lb (108.9 kg)   21 245 lb (111.1 kg)       I/O (24 Hours)    Intake/Output Summary (Last 24 hours) at 2021 1620  Last data filed at 2021 0845  Gross per 24 hour   Intake 2498 ml   Output 2000 ml   Net 498 ml       EXAM     General Appearance  Awake, alert, oriented, in no acute distress  HEENT - normocephalic, atraumatic.  [x]  Mallampati  [x] Crowded airway   [x] Macroglossia  []  Retrognathia  [] Micrognathia  []  Normal tongue size []  Normal Bite  [] [place] sign positive    Neck - Supple,  trachea midline   Lungs -diminished  Cardiovascular - Heart sounds are normal.  Regular rate and rhythm   Abdomen - Soft, nontender, nondistended, no masses or organomegaly  Neurologic - There are no focal motor or sensory deficits  Skin - No bruising or bleeding  Extremities - No clubbing, cyanosis, edema    MEDS      ipratropium-albuterol  1 ampule Inhalation 4x daily    amLODIPine  10 mg Oral Daily    lisinopril  20 mg Oral Daily    sodium chloride flush  10 mL IntraVENous 2 times per day    acetaminophen  650 mg Oral Q6H    sennosides-docusate sodium  1 tablet Oral BID    aspirin  81 mg Oral BID      lactated ringers 50 mL/hr at 09/22/21 1225    sodium chloride       sodium chloride flush, sodium chloride, oxyCODONE **OR** oxyCODONE, HYDROmorphone **OR** HYDROmorphone, magnesium hydroxide, ondansetron **OR** ondansetron    LABS   CBC   Recent Labs     09/23/21  0549   WBC 11.5*   HGB 13.0   HCT 39.8   MCV 90.6        BMP:   Lab Results   Component Value Date     09/23/2021    K 4.2 09/23/2021     09/23/2021    CO2 25 09/23/2021    BUN 9 09/23/2021    CREATININE 0.54 09/23/2021    CALCIUM 8.2 09/23/2021    GFRAA >60 09/23/2021    LABGLOM >60 09/23/2021     ABGs:  Lab Results   Component Value Date    PHART 7.433 09/22/2021    PO2ART 67.7 09/22/2021    JQB2DNX 41.3 09/22/2021      Lab Results   Component Value Date    MODE NOT REPORTED 09/22/2021     Ionized Calcium:  No results found for: IONCA  Magnesium:  No results found for: MG  Phosphorus:  No results found for: PHOS     LIVER PROFILE No results for input(s): AST, ALT, LIPASE, BILIDIR, BILITOT, ALKPHOS in the last 72 hours. Invalid input(s): AMYLASE,  ALB  INR No results for input(s): INR in the last 72 hours.   PTT No results found for: APTT      RADIOLOGY     (See actual reports for details)    ASSESSMENT/PLAN   Active Problems:    Primary osteoarthritis of knees, bilateral    Acute respiratory failure with hypoxia and hypercapnia (HCC)    KEVIN (obstructive sleep apnea)    Morbid obesity (HCC)    History of tobacco use    COPD (chronic obstructive pulmonary disease) ([facility] [address]  Resolved Problems:    * No resolved hospital problems.  *    Seems to be close to her baseline  Agree she will need rehab  Discontinue BiPAP as she is no longer using and it is needed for other patients in the hospital  Okay to move to medical surgical floor with telemetry  Watch for oversedation with pain medications due to KEVIN  Encourage her to use incentive spirometry    Electronically signed by Abrahan Davis MD on 9/23/2021 at 4:20 PM

## 2021-09-23 NOTE — PLAN OF CARE
Problem: Gas Exchange - Impaired:  Goal: Levels of oxygenation will improve  Description: Levels of oxygenation will improve  9/23/2021 0516 by Shannan Martinez RN  Outcome: Ongoing     Problem: Falls - Risk of:  Goal: Will remain free from falls  Description: Will remain free from falls  9/23/2021 0516 by Shannan Martinez RN  Outcome: Ongoing     Problem: Falls - Risk of:  Goal: Absence of physical injury  Description: Absence of physical injury  9/23/2021 0516 by Shannan Martinez RN  Outcome: Ongoing     Problem: Pain:  Goal: Pain level will decrease  Description: Pain level will decrease  9/23/2021 0516 by Shannan Martinez RN  Outcome: Ongoing     Problem: Pain:  Goal: Control of acute pain  Description: Control of acute pain  9/23/2021 0516 by Shannan Martinez RN  Outcome: Ongoing     Problem: Pain:  Goal: Control of chronic pain  Description: Control of chronic pain  9/23/2021 0516 by Shannan Martinez RN  Outcome: Ongoing     Problem: Skin Integrity:  Goal: Will show no infection signs and symptoms  Description: Will show no infection signs and symptoms  9/23/2021 0516 by Shannan Martinez RN  Outcome: Ongoing     Problem: Skin Integrity:  Goal: Absence of new skin breakdown  Description: Absence of new skin breakdown  9/23/2021 0516 by Shannan Martinez RN  Outcome: Ongoing     Problem: Musculor/Skeletal Functional Status  Goal: Highest potential functional level  9/23/2021 0516 by Shannan Martinez RN  Outcome: Ongoing     Problem: Musculor/Skeletal Functional Status  Goal: Absence of falls  9/23/2021 0516 by Shannan Martinez RN  Outcome: Ongoing

## 2021-09-23 NOTE — PROGRESS NOTES
[address] [facility]    INPATIENT OCCUPATIONAL THERAPY  PROGRESS NOTE  Date: 2021  Patient Name: Jennifer Terry      Room: 4286/8891-42  MRN: [id number]    : 1960  (64 y.o.) Gender: female     Discharge Recommendations: The patient would benefit from an intensive level of therapy after discharge from the facility. They should be able to tolerate 3-hours of Combined OT/PT/ST over 5 days/week or at least 900 minutes of  Combined Therapy over 7 days. Referring Practitioner: Sharon Godinez MD  Diagnosis: B TKA  General  Chart Reviewed: Yes  Patient assessed for rehabilitation services?: Yes  Family / Caregiver Present: No  Referring Practitioner: Sharon Godinez MD  Diagnosis: B TKA    Restrictions  Restrictions/Precautions: Weight Bearing, Fall Risk, Up as Tolerated  Implants present? : Metal implants (B TKA, Metal plate and screws in R UE)  Other position/activity restrictions: FWBAT RLE/LLE  Right Lower Extremity Weight Bearing: Weight Bearing As Tolerated  Left Lower Extremity Weight Bearing: Weight Bearing As Tolerated  Required Braces or Orthoses?: No      Subjective  Subjective: pt states that she will not have any support at home  Comments: pt alert and motivated  Patient Currently in Pain: Yes  Pain Level: 7  Pain Location: Knee  Pain Orientation: Left;Right  Overall Orientation Status: Within Functional Limits  Patient Observation  Observations: BLE wrapped knee>ankles  Pain Assessment  Pain Assessment: 0-10  Pain Level: 7  Pain Type: Surgical pain  Pain Location: Knee  Pain Orientation: Left, Right  Pain Descriptors: Sharp  Pain Frequency: Continuous  Response to Pain Intervention: Patient Satisfied    Objective  Cognition  Overall Cognitive Status: WFL  Bed mobility  Rolling to Right: Minimal assistance  Supine to Sit: Minimal assistance  Sit to Supine:  Moderate assistance;2 Person assistance  Scooting: Maximal assistance (x1 to EOB)  Balance  Sitting Balance: Supervision  Standing Balance: Contact guard assistance  Standing Balance  Time: AM: ~1-2 minutes, PM: 2-3 minutes x 2  Activity: self care, transfers, functional mobility  Comment: 1 UE support on RW during perineal care  Functional Mobility  Functional - Mobility Device: Rolling Walker  Activity: Other (AM:4-5 steps forward/back; pt indicated feeling \"unsteady. \" PM: bed>BSC, BSC>chair transfers, Assist with line management, no LOB noted)  Assist Level: Contact guard assistance  Functional Mobility Comments: slow guarded movements noted    ADL  Feeding: Independent  Grooming: Setup  UE Bathing: Setup  LE Bathing: Maximum assistance  UE Dressing: Contact guard assistance  LE Dressing: Maximum assistance  Toileting: Contact guard assistance   Additional Comments: pt completes grooming and UE bathing sitting EOB with SBA for safety other ADL scores based on skilled observation and clinical reasoning unless otherwise noted. Per PT, pt unable osorio socks, requiring TA to complete. Fiona care completed in stand with 1 UE support on RW and CGA x 2     Transfers  Stand Pivot Transfers: Contact guard assistance;2 Person assistance  Sit to stand: Minimal assistance;2 Person assistance  Stand to sit: Minimal assistance;2 Person assistance  Transfer Comments: VCs for hand placement for safety with F understanding; increased time to complete  Toilet Transfers  Toilet - Technique: Stand pivot  Equipment Used: Extra wide bedside commode  Toilet Transfer: Minimal assistance;2 Person assistance  Toilet Transfers Comments: cues for hand placement for safety; increased time to complete due to pain           Assessment  Performance deficits / Impairments: Decreased functional mobility ; Decreased ADL status; Decreased ROM; Decreased strength;Decreased safe awareness;Decreased endurance;Decreased balance;Decreased high-level IADLs  Prognosis: Good  Discharge Recommendations: Patient would benefit from continued therapy after discharge;IP Rehab  Activity Tolerance: Patient Tolerated treatment well  Activity Tolerance: pt pushes through the pain  Safety Devices in place: Yes  Type of devices: All fall risk precautions in place;Call light within reach;Gait belt;Patient at risk for falls; Left in bed;Nurse notified; Left in chair (pt placed in chair after PM tx)             Patient Education:  Patient Education: OT POC, AE/DME, RW saferty and tech during func mobility/transfers, bed mobility, home safety/fall prevention,  Learner:patient  Method: demonstration, explanation and handout       Outcome: acknowledged understanding, demonstrated understanding and asked questions     Plan  Safety Devices  Safety Devices in place: Yes  Type of devices:  All fall risk precautions in place, Call light within reach, Gait belt, Patient at risk for falls, Left in bed, Nurse notified, Left in chair (pt placed in chair after PM tx)  Plan  Times per week: 5-7  Current Treatment Recommendations: Strengthening, ROM, Balance Training, Functional Mobility Training, Endurance Training, Pain Management, Safety Education & Training, Patient/Caregiver Education & Training, Equipment Evaluation, Education, & procurement, Self-Care / ADL      Goals  Short term goals  Time Frame for Short term goals: By discharge  Short term goal 1: Patient will complete upper body dressing/bathing with modified independence and lower body dressing/bathing with CGA, and good safety  Short term goal 2: Patient will perform transfers/functional mobility, with least restrictive device, with supervision and good safety  Short term goal 3: Patient will tolerate standing 6+ minutes, with least restrictive device, and good safety while engaged in self care/mobility  Short term goal 4: Patient will actively participate in 15+ minutes of therapeutic exercise/functional mobility to promote independence with self care and mobility      Electronically signed by CALISTA Strong on 9/23/21 at 2:26 PM EDT 09/23/21 1402 09/23/21 1424   OT Individual Minutes   Time In [phone]   Time Out 1029 1350   Minutes 43 45

## 2021-09-23 NOTE — CARE COORDINATION
EMILE spoke with Sonya in ARU; stated Dr. Davis Perrin will see pt later today that rehab beds would be opening up tomorrow 9/24/2021.

## 2021-09-23 NOTE — CARE COORDINATION
SW spoke with pt to discuss discharge plans. Pt stated that she was willing to do what staff recommends whether it is intensive therapy or a SNF. SW contacted nurse Delmi Conroy to request a PMNR evaluation. SW left message with Sonya in ARU to continue referral process.

## 2021-09-23 NOTE — FLOWSHEET NOTE
PT said \"I am OK today. \"       09/23/21 1447   Encounter Summary   Services provided to: Patient   Referral/Consult From: Rounding   Continue Visiting   (9/23/21)   Complexity of Encounter Moderate   Length of Encounter 15 minutes   Routine   Type Initial   Assessment Calm; Approachable;Coping   Intervention Active listening;[place];Explored feelings, thoughts, concerns;Sustaining presence/ Ministry of presence   Outcome Acceptance; Coping;Engaged in conversation

## 2021-09-23 NOTE — PROGRESS NOTES
Post Operative Progress Note    9/23/2021 10:22 AM  Info:   Admit Date: 9/21/2021  PCP: Rodríguez Lo      Medications:   Scheduled Meds:   ipratropium-albuterol  1 ampule Inhalation 4x daily    heparin (porcine)  5,000 Units SubCUTAneous 3 times per day    amLODIPine  10 mg Oral Daily    lisinopril  20 mg Oral Daily    sodium chloride flush  10 mL IntraVENous 2 times per day    acetaminophen  650 mg Oral Q6H    sennosides-docusate sodium  1 tablet Oral BID    aspirin  81 mg Oral BID     Continuous Infusions:   lactated ringers 50 mL/hr at 09/22/21 1225    sodium chloride       PRN Meds:sodium chloride flush, sodium chloride, oxyCODONE **OR** oxyCODONE, HYDROmorphone **OR** HYDROmorphone, magnesium hydroxide, ondansetron **OR** ondansetron    Diet:   Diet: ADULT DIET; Regular    Subjective:   Systemic or Specific Complaints: Patient having fairly moderate pain in both knees and moving very slowly around her room. Patient seen while up with therapy. She is moderate at 90% after a few steps.     Objective:     Patient Vitals for the past 24 hrs:   BP Temp Temp src Pulse Resp SpO2 Weight   09/23/21 0901 -- -- -- -- -- 93 % --   09/23/21 0845 -- -- -- -- -- 94 % --   09/23/21 0741 129/70 98.4 °F (36.9 °C) Oral 77 18 92 % --   09/23/21 0734 -- -- -- -- 18 91 % --   09/23/21 0530 -- -- -- -- -- -- 258 lb 2.5 oz (117.1 kg)   09/23/21 0505 -- -- -- -- 18 92 % --   09/23/21 0000 123/64 98.4 °F (36.9 °C) Oral 81 19 91 % --   09/22/21 2356 -- -- -- -- 20 90 % --   09/22/21 1946 (!) 143/60 98.6 °F (37 °C) Oral 89 18 91 % --   09/22/21 1906 -- -- -- -- -- 91 % --   09/22/21 1538 -- -- -- -- -- 91 % --   09/22/21 1430 (!) 141/82 98.3 °F (36.8 °C) Oral 80 20 91 % --   09/22/21 1407 -- -- -- 76 21 90 % --   09/22/21 1406 -- -- -- 76 21 90 % --   09/22/21 1405 -- -- -- 79 20 90 % --   09/22/21 1404 -- -- -- 79 21 90 % --   09/22/21 1400 (!) 140/67 -- -- 77 22 -- --   09/22/21 1345 -- -- -- 80 20 -- --   09/22/21 1330 -- -- -- 83 21 -- --   09/22/21 1315 -- -- -- 81 26 -- --   09/22/21 1215 -- -- -- 90 26 -- --   09/22/21 1200 (!) 144/64 98.6 °F (37 °C) -- 85 21 -- --   09/22/21 1145 -- -- -- 73 21 -- --   09/22/21 1138 -- -- -- 76 22 93 % --   09/22/21 1137 -- -- -- 70 22 92 % --   09/22/21 1136 -- -- -- 74 23 92 % --   09/22/21 1135 -- -- -- 75 23 93 % --   09/22/21 1130 -- -- -- 77 22 -- --   09/22/21 1115 -- -- -- 78 20 -- --   09/22/21 1100 (!) 142/64 -- -- 80 18 -- --   09/22/21 1045 -- -- -- 84 19 -- --   09/22/21 1039 -- -- -- -- 21 93 % --   09/22/21 1030 -- -- -- 92 18 -- --         Wound: incision clean and dry without sign of infection   Motion: Painful range of Motion in affected extremity   DVT Exam:  no evidence of DVT on physical examination         Data Review  CBC:   Recent Labs     09/22/21  0536 09/22/21  0949 09/23/21  0549   WBC  --  19.6* 11.5*   HGB 14.3 14.8 13.0   PLT  --  262 196           Assessment:   Patient is S/P bilateral Knee, Arthoplasty  Pain is not well controlled. She has no nausea and no vomiting.     Current activity is up with assistance, requiring 2 standby assist with minimal ambulation at this time        Plan:      1:  Continue Physical Therapy  2:  Continue Deep venous thrombosis prophylaxis  3:  Continue Pain Control  4:  Discharge plans SNF, patient definitely will require and benefit from being in a acute rehab setting      Electronically signed by Amalia Cantrell MD on 9/23/2021 at 10:22 AM

## 2021-09-23 NOTE — PROGRESS NOTES
Patient transferred into room 2063. Patient is A&Ox4. Vitals stable. Call light within reach. Bed wheels locked. Telemetry initiated. No signs of distress.

## 2021-09-23 NOTE — PROGRESS NOTES
Physical Therapy  [address] [facility]   Physical Therapy Progress Note    Date: 21  Patient Name: Matt Arenas       Room: 1980/5227-26  MRN: [id number]   Account: [de-identified]   : 1960  (64 y.o.) Gender: female     Referring Practitioner: Shaggy Michelle MD (P)  Diagnosis: (P) B TKA  Past Medical History:  has a past medical history of Anxiety, Arthritis, Depression, Hypertension, Knee pain, Murmur, PONV (postoperative nausea and vomiting), and Sleep apnea. Past Surgical History:   has a past surgical history that includes Rotator cuff repair (Left); tumor removal; Ulnar tunnel release (Right); skin biopsy; eye surgery (Left, 2018); and knee surgery (Bilateral, 2021). Additional Pertinent Hx: pt is a 64 y.o. woman recently admitted to hospital for Bilateral TKA procedure on 21 by Dr. Alexandru Davis. Pt with initial diagnosis of Bilateral knee Degenerative Joint Disease and H/O osteoarthritis, sleep apnea (no machine), and HTN. Restrictions/Precautions  Restrictions/Precautions: Weight Bearing; Fall Risk;Up as Tolerated  Required Braces or Orthoses?: No  Implants present? : Metal implants (B TKA, Metal plate and screws in R UE)  Lower Extremity Weight Bearing Restrictions  Right Lower Extremity Weight Bearing: Weight Bearing As Tolerated  Left Lower Extremity Weight Bearing: Weight Bearing As Tolerated  Position Activity Restriction  Other position/activity restrictions: FWBAT RLE/LLE       Comments: Pt awake resting in bed upon arrival to room. Agreeable to with with PT/OT for therapy. Co-treat done with BATEMAN both am/pm . Ace wraps removed from (B) LE's during afternoon session. Dr. Alexandru Davis had verbalized they could come off during am tx session. Vital Signs  Patient Currently in Pain: Yes  Pain Assessment: 0-10  Pain Level: 7  Pain Type: Surgical pain  Pain Location: Knee  Pain Orientation: Right  Multiple Pain Sites:  Yes                Bed Mobility:   Bed Mobility  Supine to Sit: Moderate assistance (CGA for safety/back support of 2nd person)  Sit to Supine: Dependent/Total (Mod Ax2)  Scooting: Maximal assistance (x1 to EOB, Mod A in pm)  Bed mobility  Scooting: Maximal assistance (x1 to EOB, Mod A in pm)    Transfers:  Sit to Stand: Moderate Assistance;2 Person Assistance  Stand to sit: Contact guard assistance;2 Person Assistance              WB Status: FWBAT BLEs  Ambulation 1  Surface: level tile  Device: Rolling Walker  Other Apparatus: O2 (1)  Assistance: Contact guard assistance;2 Person assistance  Quality of Gait: inc pain with WB, antalgic gait, Wide LUX, pt moves slowly/cautiously and barely clears BLEs from floor  Gait Deviations: Slow Janet; Increased LUX; Decreased step length;Decreased step height  Distance: 4ft fwd/4ft retro and 2 side steps up toward [facility]  Comments: Declined sitting up in chair as she had already been up to chair earlier this morning. Just wanted to get back into bed to take a nap. Ambulation 2  Surface - 2: level tile  Device 2: Rolling Walker  Other Apparatus 2: O2 (1)  Assistance 2: Contact guard assistance;2 Person assistance  Quality of Gait 2: slow antalgic gait, continues to rely heavily on UE's  Gait Deviations: Slow Janet; Increased LUX; Decreased step length;Decreased step height  Distance: 4ft  Comments: Stand pivot completed from bed to [facility] initially during pm session. From there pt amb 4ft from [facility] to recliner.      Stairs/Curb  Stairs?: No          BALANCE Posture: Fair  Sitting - Static: Good;-  Standing - Static: Fair;+ (w/RW)  Standing - Dynamic: Fair (w/RW)    EXERCISES    Other exercises?: Yes  Other exercises 1: (B) LE seated ex AAROM x 5ea  Other exercises 2: standing holly at RW x 4min total-heavy reliance on RW with UE's  Other exercises 3: Dangled at EOB x 15mins total while performing functional tasks and strengthening ex  Other exercises 4: (B) seated heel slides working on ROM  Other exercises 5: Seated Hip add isometric

## 2021-09-23 NOTE — PLAN OF CARE
Problem: Gas Exchange - Impaired:  Goal: Levels of oxygenation will improve  Description: Levels of oxygenation will improve  9/23/2021 1625 by Magdiel Forbes RN  Outcome: Ongoing   Pt on NC 1L awake, 4L night. Refusing PAP    Problem: Falls - Risk of:  Goal: Will remain free from falls  Description: Will remain free from falls  9/23/2021 1625 by Magdiel Forbes RN  Outcome: Ongoing   High fall risk. Precautions in place    Problem: Pain:  Goal: Pain level will decrease  Description: Pain level will decrease  9/23/2021 1625 by Magdiel Forbes RN  Outcome: Ongoing   Non pharm utilized and meds. Problem: Skin Integrity:  Goal: Will show no infection signs and symptoms  Description: Will show no infection signs and symptoms  9/23/2021 1625 by Magdiel Forbes RN  Outcome: Ongoing   Head to toe charted. LDAs on    Problem: Musculor/Skeletal Functional Status  Goal: Highest potential functional level  9/23/2021 1625 by Magdiel Forbes RN  Outcome: Ongoing   Pt/ot on for pt.  Pt up to chair and ambulate in room multiple times

## 2021-09-24 ENCOUNTER — HOSPITAL ENCOUNTER (INPATIENT)
Age: 61
LOS: 11 days | Discharge: HOME HEALTH | DRG: 862 | End: 2021-10-05
Attending: STUDENT IN AN ORGANIZED HEALTH CARE EDUCATION/TRAINING PROGRAM | Admitting: STUDENT IN AN ORGANIZED HEALTH CARE EDUCATION/TRAINING PROGRAM
Payer: MEDICAID

## 2021-09-24 VITALS
HEART RATE: 80 BPM | WEIGHT: 258.16 LBS | BODY MASS INDEX: 47.51 KG/M2 | TEMPERATURE: 98.2 F | SYSTOLIC BLOOD PRESSURE: 144 MMHG | HEIGHT: 62 IN | DIASTOLIC BLOOD PRESSURE: 71 MMHG | OXYGEN SATURATION: 93 % | RESPIRATION RATE: 18 BRPM

## 2021-09-24 DIAGNOSIS — Z96.653 S/P TOTAL KNEE ARTHROPLASTY, BILATERAL: Primary | ICD-10-CM

## 2021-09-24 PROBLEM — Z47.1 AFTERCARE FOLLOWING BILATERAL KNEE JOINT REPLACEMENT SURGERY: Status: ACTIVE | Noted: 2021-09-24

## 2021-09-24 PROCEDURE — 6370000000 HC RX 637 (ALT 250 FOR IP): Performed by: INTERNAL MEDICINE

## 2021-09-24 PROCEDURE — 97535 SELF CARE MNGMENT TRAINING: CPT

## 2021-09-24 PROCEDURE — 1180000000 HC REHAB R&B

## 2021-09-24 PROCEDURE — 97530 THERAPEUTIC ACTIVITIES: CPT

## 2021-09-24 PROCEDURE — 94640 AIRWAY INHALATION TREATMENT: CPT

## 2021-09-24 PROCEDURE — 97116 GAIT TRAINING THERAPY: CPT

## 2021-09-24 PROCEDURE — 6370000000 HC RX 637 (ALT 250 FOR IP): Performed by: ORTHOPAEDIC SURGERY

## 2021-09-24 PROCEDURE — G0378 HOSPITAL OBSERVATION PER HR: HCPCS

## 2021-09-24 PROCEDURE — 97110 THERAPEUTIC EXERCISES: CPT

## 2021-09-24 PROCEDURE — 1200000000 HC SEMI PRIVATE

## 2021-09-24 PROCEDURE — 2700000000 HC OXYGEN THERAPY PER DAY

## 2021-09-24 PROCEDURE — 94761 N-INVAS EAR/PLS OXIMETRY MLT: CPT

## 2021-09-24 RX ORDER — SENNA PLUS 8.6 MG/1
2 TABLET ORAL DAILY PRN
Status: DISCONTINUED | OUTPATIENT
Start: 2021-09-24 | End: 2021-10-02

## 2021-09-24 RX ORDER — BISACODYL 10 MG
10 SUPPOSITORY, RECTAL RECTAL DAILY PRN
Status: DISCONTINUED | OUTPATIENT
Start: 2021-09-24 | End: 2021-10-05 | Stop reason: HOSPADM

## 2021-09-24 RX ORDER — ACETAMINOPHEN 325 MG/1
650 TABLET ORAL EVERY 6 HOURS
Status: CANCELLED | OUTPATIENT
Start: 2021-09-24

## 2021-09-24 RX ORDER — LISINOPRIL 20 MG/1
20 TABLET ORAL DAILY
Status: DISCONTINUED | OUTPATIENT
Start: 2021-09-25 | End: 2021-10-05 | Stop reason: HOSPADM

## 2021-09-24 RX ORDER — AMLODIPINE BESYLATE 10 MG/1
10 TABLET ORAL DAILY
Status: DISCONTINUED | OUTPATIENT
Start: 2021-09-25 | End: 2021-10-05 | Stop reason: HOSPADM

## 2021-09-24 RX ORDER — IPRATROPIUM BROMIDE AND ALBUTEROL SULFATE 2.5; .5 MG/3ML; MG/3ML
1 SOLUTION RESPIRATORY (INHALATION) 4 TIMES DAILY
Status: CANCELLED | OUTPATIENT
Start: 2021-09-24

## 2021-09-24 RX ORDER — LISINOPRIL 20 MG/1
20 TABLET ORAL DAILY
Status: CANCELLED | OUTPATIENT
Start: 2021-09-25

## 2021-09-24 RX ORDER — SENNA AND DOCUSATE SODIUM 50; 8.6 MG/1; MG/1
1 TABLET, FILM COATED ORAL 2 TIMES DAILY
Status: CANCELLED | OUTPATIENT
Start: 2021-09-24

## 2021-09-24 RX ORDER — ASPIRIN 81 MG/1
81 TABLET ORAL 2 TIMES DAILY
Status: DISCONTINUED | OUTPATIENT
Start: 2021-09-24 | End: 2021-10-05 | Stop reason: HOSPADM

## 2021-09-24 RX ORDER — IPRATROPIUM BROMIDE AND ALBUTEROL SULFATE 2.5; .5 MG/3ML; MG/3ML
1 SOLUTION RESPIRATORY (INHALATION) 4 TIMES DAILY
Status: DISCONTINUED | OUTPATIENT
Start: 2021-09-24 | End: 2021-10-05

## 2021-09-24 RX ORDER — OXYCODONE HYDROCHLORIDE 5 MG/1
5 TABLET ORAL EVERY 4 HOURS PRN
Status: CANCELLED | OUTPATIENT
Start: 2021-09-24

## 2021-09-24 RX ORDER — POLYETHYLENE GLYCOL 3350 17 G/17G
17 POWDER, FOR SOLUTION ORAL DAILY
Status: DISCONTINUED | OUTPATIENT
Start: 2021-09-24 | End: 2021-10-05 | Stop reason: HOSPADM

## 2021-09-24 RX ORDER — OXYCODONE HYDROCHLORIDE 10 MG/1
10 TABLET ORAL EVERY 4 HOURS PRN
Status: CANCELLED | OUTPATIENT
Start: 2021-09-24

## 2021-09-24 RX ORDER — ASPIRIN 81 MG/1
81 TABLET ORAL 2 TIMES DAILY
Status: CANCELLED | OUTPATIENT
Start: 2021-09-24

## 2021-09-24 RX ORDER — OXYCODONE HYDROCHLORIDE 10 MG/1
10 TABLET ORAL EVERY 4 HOURS PRN
Status: DISCONTINUED | OUTPATIENT
Start: 2021-09-24 | End: 2021-09-28

## 2021-09-24 RX ORDER — OXYCODONE HYDROCHLORIDE 5 MG/1
5 TABLET ORAL EVERY 4 HOURS PRN
Status: DISCONTINUED | OUTPATIENT
Start: 2021-09-24 | End: 2021-09-28

## 2021-09-24 RX ORDER — SENNA AND DOCUSATE SODIUM 50; 8.6 MG/1; MG/1
1 TABLET, FILM COATED ORAL 2 TIMES DAILY
Status: DISCONTINUED | OUTPATIENT
Start: 2021-09-24 | End: 2021-10-02

## 2021-09-24 RX ORDER — ACETAMINOPHEN 325 MG/1
650 TABLET ORAL EVERY 4 HOURS PRN
Status: DISCONTINUED | OUTPATIENT
Start: 2021-09-24 | End: 2021-10-05 | Stop reason: HOSPADM

## 2021-09-24 RX ORDER — AMLODIPINE BESYLATE 10 MG/1
10 TABLET ORAL DAILY
Status: CANCELLED | OUTPATIENT
Start: 2021-09-25

## 2021-09-24 RX ADMIN — IPRATROPIUM BROMIDE AND ALBUTEROL SULFATE 1 AMPULE: .5; 3 SOLUTION RESPIRATORY (INHALATION) at 15:06

## 2021-09-24 RX ADMIN — IPRATROPIUM BROMIDE AND ALBUTEROL SULFATE 1 AMPULE: .5; 3 SOLUTION RESPIRATORY (INHALATION) at 20:28

## 2021-09-24 RX ADMIN — OXYCODONE HYDROCHLORIDE 5 MG: 5 TABLET ORAL at 01:18

## 2021-09-24 RX ADMIN — ASPIRIN 81 MG: 81 TABLET, COATED ORAL at 09:00

## 2021-09-24 RX ADMIN — ASPIRIN 81 MG: 81 TABLET, COATED ORAL at 21:01

## 2021-09-24 RX ADMIN — LISINOPRIL 20 MG: 20 TABLET ORAL at 09:00

## 2021-09-24 RX ADMIN — ACETAMINOPHEN 650 MG: 325 TABLET, FILM COATED ORAL at 01:17

## 2021-09-24 RX ADMIN — IPRATROPIUM BROMIDE AND ALBUTEROL SULFATE 1 AMPULE: .5; 3 SOLUTION RESPIRATORY (INHALATION) at 06:57

## 2021-09-24 RX ADMIN — OXYCODONE HYDROCHLORIDE 10 MG: 10 TABLET ORAL at 08:59

## 2021-09-24 RX ADMIN — IPRATROPIUM BROMIDE AND ALBUTEROL SULFATE 1 AMPULE: .5; 3 SOLUTION RESPIRATORY (INHALATION) at 10:42

## 2021-09-24 RX ADMIN — OXYCODONE HYDROCHLORIDE 10 MG: 10 TABLET ORAL at 17:30

## 2021-09-24 RX ADMIN — ACETAMINOPHEN 650 MG: 325 TABLET, FILM COATED ORAL at 14:18

## 2021-09-24 RX ADMIN — AMLODIPINE BESYLATE 10 MG: 10 TABLET ORAL at 08:59

## 2021-09-24 ASSESSMENT — PAIN SCALES - GENERAL
PAINLEVEL_OUTOF10: 7
PAINLEVEL_OUTOF10: 6
PAINLEVEL_OUTOF10: 5
PAINLEVEL_OUTOF10: 6
PAINLEVEL_OUTOF10: 7
PAINLEVEL_OUTOF10: 10
PAINLEVEL_OUTOF10: 5

## 2021-09-24 ASSESSMENT — PAIN DESCRIPTION - ONSET: ONSET: GRADUAL

## 2021-09-24 ASSESSMENT — PAIN DESCRIPTION - PAIN TYPE
TYPE: SURGICAL PAIN;ACUTE PAIN
TYPE: ACUTE PAIN;SURGICAL PAIN
TYPE: ACUTE PAIN;SURGICAL PAIN

## 2021-09-24 ASSESSMENT — PAIN DESCRIPTION - LOCATION
LOCATION: KNEE

## 2021-09-24 ASSESSMENT — PAIN DESCRIPTION - FREQUENCY
FREQUENCY: INTERMITTENT
FREQUENCY: CONTINUOUS

## 2021-09-24 ASSESSMENT — PAIN DESCRIPTION - ORIENTATION
ORIENTATION: RIGHT;LEFT

## 2021-09-24 ASSESSMENT — PAIN DESCRIPTION - DESCRIPTORS
DESCRIPTORS: THROBBING
DESCRIPTORS: SHARP

## 2021-09-24 ASSESSMENT — PAIN DESCRIPTION - PROGRESSION: CLINICAL_PROGRESSION: NOT CHANGED

## 2021-09-24 ASSESSMENT — PAIN - FUNCTIONAL ASSESSMENT: PAIN_FUNCTIONAL_ASSESSMENT: PREVENTS OR INTERFERES SOME ACTIVE ACTIVITIES AND ADLS

## 2021-09-24 NOTE — PROGRESS NOTES
Pt's IV was leaking and could not be saved. IV was removed and pt states that she does wish to be poked again at this time.

## 2021-09-24 NOTE — PROGRESS NOTES
Physical Therapy  Facility/Department: [facility] MED SURG  Daily Treatment Note  NAME: Nohemi Bullock  : 1960  MRN: [id number]    Date of Service: 2021    Discharge Recommendations:  Patient would benefit from continued therapy after discharge   PT Equipment Recommendations  Equipment Needed: Yes  Walker: Rolling  Other: Need for RW TBD, pt has a 4WW but may require a more stable AD upon d/c    Assessment   Body structures, Functions, Activity limitations: Decreased functional mobility ; Decreased ROM; Decreased strength;Decreased endurance;Decreased balance; Increased pain  Assessment: Pt is not safe to return home at their current functional level. Recommending continued PT services in ARU to address impaired balance, ROM, strength, and functional mobility and to progress pt towards prior level of independence to allow a safe return home. Treatment Diagnosis: impaired functional mobility secondary to Bilateral TKA procedure  Specific instructions for Next Treatment: Gait with RW, Strengthening/ROM Exercises, Review HEP, Assess Stair mobility (pt has ramp and 1 step to enter house)  Prognosis: Fair  Decision Making: Medium Complexity  History: H/O HTN, Sleep apnea, current smoker, depression/anxiety. Exam: ROM, MMT, Balance, and functional mobility assessments. Clinical Presentation: pt presents with BLE deficits in ROM, strength, and increased pain. Impaired balance and mobility requiring 1-2 person assist for mobility at this time. REQUIRES PT FOLLOW UP: Yes  Activity Tolerance  Activity Tolerance: Patient limited by pain; Patient limited by endurance  Activity Tolerance: seated rest breaks provided PRN. Patient Diagnosis(es): The encounter diagnosis was Primary osteoarthritis of both knees. has a past medical history of Anxiety, Arthritis, Depression, Hypertension, Knee pain, Murmur, PONV (postoperative nausea and vomiting), and Sleep apnea.    has a past surgical history that includes Rotator cuff repair (Left); tumor removal; Ulnar tunnel release (Right); skin biopsy; eye surgery (Left, 2018); and knee surgery (Bilateral, 9/21/2021). Restrictions  Restrictions/Precautions  Restrictions/Precautions: Weight Bearing, Fall Risk, Up as Tolerated  Required Braces or Orthoses?: No  Implants present? : Metal implants (B TKA, Metal plate and screws in R UE)  Lower Extremity Weight Bearing Restrictions  Right Lower Extremity Weight Bearing: Weight Bearing As Tolerated  Left Lower Extremity Weight Bearing: Weight Bearing As Tolerated  Position Activity Restriction  Other position/activity restrictions: FWBAT RLE/LLE  Subjective   General  Chart Reviewed: Yes  Additional Pertinent Hx: pt is a 64 y.o. woman recently admitted to hospital for Bilateral TKA procedure on 9/21/21 by Dr. Mikala Jiménez. Pt with initial diagnosis of Bilateral knee Degenerative Joint Disease and H/O osteoarthritis, sleep apnea (no machine), and HTN. Family / Caregiver Present: No  Referring Practitioner: Dr. Long Lerma: Pt is in bed working with OT upon arrival. Pt reports increase pain in BLE knees. Pt also reports decrease sleep last night. General Comment  Comments: Co-treat with Ambreen Carpio. Pain Screening  Patient Currently in Pain: Yes  Pain Assessment  Pain Assessment: 0-10  Pain Level: 7  Pain Type: Acute pain;Surgical pain  Pain Location: Knee  Pain Orientation: Right;Left  Non-Pharmaceutical Pain Intervention(s): Ambulation/Increased Activity;Cold applied;Distraction;Repositioned  Response to Pain Intervention: Patient Satisfied  Vital Signs  Patient Currently in Pain: Yes  Oxygen Therapy  O2 Device: Nasal cannula  Patient Observation  Observations: BLE not ace wrapped this date.         Orientation  Orientation  Overall Orientation Status: Within Functional Limits  Objective   Bed mobility  Rolling to Left: Moderate assistance  Rolling to Right: Moderate assistance  Supine to Sit: Moderate assistance  Sit to Supine: Moderate assistance  Scooting: Maximal assistance (x2 to HOB, x1 to EOB)  Comment: Pt requires Min A with BLE's to completed bed mob. instructed pt to utilized bed rails with UE's to increase independence, good carryover. Pt requires increase time to complete all tasks due to pain. Upon sitting EOB, pt requires Mod Ax1 to scoot to EOB. Encouraged use of bed rails however unable to at this time. Pt demos reaching for BATEMAN's UE for assistance. Pt requires Mod A to complete partial rolling in each direction x2. Transfers  Sit to Stand: Moderate Assistance;2 Person Assistance;Minimal Assistance (Second person Min A x1)  Stand to sit: Contact guard assistance;2 Person Assistance  Comment: All transfers performed with RW. VCs for hand placement. Pt continues to require two person A to stand from EOB and BSC at this time. Second person Min A for safety. Pt initialy demos a very forward flexed posture however able to correct with increase time and cues. Edu provided on the importance of improving posture for energy conservation and stability. Ambulation  Ambulation?: Yes  WB Status: FWBAT BLEs  More Ambulation?: Yes  Ambulation 1  Surface: level tile  Device: Rolling Walker  Other Apparatus: O2  Assistance: Contact guard assistance;2 Person assistance  Quality of Gait: inc pain with WB, antalgic gait, Wide LUX, pt moves slowly/cautiously and barely clears BLEs from floor. Edu provided on the importance of improving heel strike and increasing WB into BLE's to offload UE's energy expediture. Gait Deviations: Slow Janet; Increased LUX; Decreased step length;Decreased step height  Distance: 6'x2  Comments: Declined sitting up in chair at this time despite edu. Just wanted to get back into bed to take a nap.   Stairs/Curb  Stairs?: No     Balance  Posture: Fair  Sitting - Static: Good;-  Sitting - Dynamic: Fair  Standing - Static: Fair;+ (w/RW)  Standing - Dynamic: Fair (w/RW)  Comments: standing balance assessed with RW  Exercises  Comments: Ed on importance of performing exercises throughout the day, Handouts provided  Other exercises  Other exercises?: Yes  Other exercises 1: (B) LE seated ex AAROM x 10 LAQ's and HS curls. [place] TB utilized for HS curls. Other exercises 2: standing holly at RW x 4min total-heavy reliance on RW with UE's. PT requires total assist this date for analia care despite encouragement to complete with independence. Other exercises 3: Dangled at EOB x 30mins total while performing functional tasks and strengthening ex  Other exercises 6: STS x2 total.  Other exercises 7: Edu on the imporance of CP, heel protection, continued ther ex, relaxing without anything under BLE's knees to precent knee flexion contracture, and the importance of OOB mobility/sitting up in a chair throughout the day. Comment: rest breaks prn, increase time required for all tasks.                Goals  Short term goals  Time Frame for Short term goals: 7 visits  Short term goal 1: pt to improve Bilat knee flexion AROM by 20 degrees R knee and 30 degrees L knee to improve overall functional mobility during transfers, gait, stairs   Short term goal 2: pt to improve BLE strength to 4/5 to improve strength and safety during functional transfers and mobility  Short term goal 3: pt to improve standing balance with RW from fair to fair+ to improve safety with functional mobility   Short term goal 4: pt to ambulate 22' with RW and CGA to improve mobility for household distances  Short term goal 5: pt to improve Bilat knee extension ROM to 0 degrees to improve joint mechanics for safe amb and transfers  Short term goal 6: Continue to assess mobility, plan to assess stair mobility (pt has ramp and 1 step to enter house)  Patient Goals   Patient goals : pt does not state goals    Plan    Plan  Times per week:  (once per day in ICU, once out of ICU proceed with BID tx's)  Times per day:  (once per day in ICU, once out of ICU proceed with BID tx's)  Specific instructions for Next Treatment: Gait with RW, Strengthening/ROM Exercises, Review HEP, Assess Stair mobility (pt has ramp and 1 step to enter house)  Current Treatment Recommendations: Strengthening, ROM, Balance Training, Functional Mobility Training, Gait Training, Stair training, Pain Management  Safety Devices  Type of devices:  All fall risk precautions in place, Call light within reach, Gait belt, Left in bed  Restraints  Initially in place: No     Therapy Time   Individual Concurrent Group Co-treatment   Time In 0929         Time Out 1032         Minutes 6 [address]

## 2021-09-24 NOTE — PROGRESS NOTES
Pulmonary Progress Note  [facility]      Patient - Zhao Valle,  Age - 64 y.o.    - 1960      Room Number - 3-01   N -  [id number]   [facility]  Date of Admission -  2021  8:12 AM        Consulting Service/Physician   Consulting - Tracey Waggoner MD  Primary Care Physician - [facility]     SUBJECTIVE   Seen on her way to rehab, oxygen weaned off saturations during the day are 93% on room air    OBJECTIVE   VITALS    height is 5' 2\" (1.575 m) and weight is 258 lb 2.5 oz (117.1 kg). Her temperature is 98.2 °F (36.8 °C). Her blood pressure is 144/71 (abnormal) and her pulse is 80. Her respiration is 18 and oxygen saturation is 93%. Body mass index is 47.22 kg/m². Temperature Range: Temp: 98.2 °F (36.8 °C) Temp  Av.1 °F (37.3 °C)  Min: 98.2 °F (36.8 °C)  Max: 100.2 °F (37.9 °C)  BP Range:  Systolic (18TBG), SIT:851 , Min:124 , BFX:850     Diastolic (55USZ), PLF:24, Min:68, Max:77    Pulse Range: Pulse  Av.5  Min: 77  Max: 92  Respiration Range: Resp  Av.5  Min: 16  Max: 20  Current Pulse Ox[de-identified]  SpO2: 93 %  24HR Pulse Ox Range:  SpO2  Av.7 %  Min: 90 %  Max: 96 %  Oxygen Amount and Delivery: O2 Flow Rate (L/min): 1 L/min    Wt Readings from Last 3 Encounters:   21 258 lb 2.5 oz (117.1 kg)   21 240 lb (108.9 kg)   21 245 lb (111.1 kg)       I/O (24 Hours)    Intake/Output Summary (Last 24 hours) at 2021 1532  Last data filed at 2021 0850  Gross per 24 hour   Intake --   Output 950 ml   Net -950 ml       EXAM     General Appearance  Awake, alert, oriented, in no acute distress  HEENT - normocephalic, atraumatic.  []  Mallampati  [x] Crowded airway   [x] Macroglossia  []  Retrognathia  [] Micrognathia  []  Normal tongue size []  Normal Bite  [] Erin sign positive    Neck - Supple,  trachea midline   Lungs -diminished but clear  Cardiovascular - Heart sounds are normal. Regular rate and rhythm   Abdomen - Soft, nontender, nondistended, no masses or organomegaly  Neurologic - There are no focal motor or sensory deficits  Skin - No bruising or bleeding  Extremities - No clubbing, cyanosis, edema    MEDS      ipratropium-albuterol  1 ampule Inhalation 4x daily    amLODIPine  10 mg Oral Daily    lisinopril  20 mg Oral Daily    sodium chloride flush  10 mL IntraVENous 2 times per day    acetaminophen  650 mg Oral Q6H    sennosides-docusate sodium  1 tablet Oral BID    aspirin  81 mg Oral BID      lactated ringers 50 mL/hr at 09/22/21 1225    sodium chloride       sodium chloride flush, sodium chloride, oxyCODONE **OR** oxyCODONE, HYDROmorphone **OR** HYDROmorphone, magnesium hydroxide, ondansetron **OR** ondansetron    LABS   CBC   Recent Labs     09/23/21  0549   WBC 11.5*   HGB 13.0   HCT 39.8   MCV 90.6        BMP:   Lab Results   Component Value Date     09/23/2021    K 4.2 09/23/2021     09/23/2021    CO2 25 09/23/2021    BUN 9 09/23/2021    CREATININE 0.54 09/23/2021    CALCIUM 8.2 09/23/2021    GFRAA >60 09/23/2021    LABGLOM >60 09/23/2021     ABGs:  Lab Results   Component Value Date    PHART 7.433 09/22/2021    PO2ART 67.7 09/22/2021    PSG1TOR 41.3 09/22/2021      Lab Results   Component Value Date    MODE NOT REPORTED 09/22/2021     Ionized Calcium:  No results found for: IONCA  Magnesium:  No results found for: MG  Phosphorus:  No results found for: PHOS     LIVER PROFILE No results for input(s): AST, ALT, LIPASE, BILIDIR, BILITOT, ALKPHOS in the last 72 hours. Invalid input(s): AMYLASE,  ALB  INR No results for input(s): INR in the last 72 hours.   PTT No results found for: APTT      RADIOLOGY     (See actual reports for details)    ASSESSMENT/PLAN   Active Problems:    Primary osteoarthritis of knees, bilateral    Acute respiratory failure with hypoxia and hypercapnia (HCC)    KEVIN (obstructive sleep apnea)    Morbid obesity ([facility] [address]    History

## 2021-09-24 NOTE — PROGRESS NOTES
Writer called rehab to let Arpit perry RN know about last BM on Monday. JOSE Perez took message to give to [place].

## 2021-09-24 NOTE — PLAN OF CARE
Problem: Falls - Risk of:  Goal: Will remain free from falls  Outcome: Ongoing     Problem: Falls - Risk of:  Goal: Absence of physical injury  Outcome: Ongoing     Problem: Skin Integrity:  Goal: Will show no infection signs and symptoms  Outcome: Ongoing     Problem: Skin Integrity:  Goal: Absence of new skin breakdown  Outcome: Ongoing

## 2021-09-24 NOTE — PROGRESS NOTES
Patient admitted from [address] [place] [address], with diagnosis of bilateral knee arthroscopy, by Dr. Kareen Cody. Orders verified with Dr. Mario Keith. Dr Gail Kaiser notified for medical management.

## 2021-09-24 NOTE — PROGRESS NOTES
Notified Tracey, [address], that per Dr Juliet Hutchison pt is approp for ARU, and Zack Ross confirmed that pt is requesting [facility]. [facility] can [facility] pt later today after a bed is available. Will need d/c readmit completed with continuation of DVT prophylaxis and IV BP meds d/c'd as [place] to [phone]. Admission Assessment completed and Dr Juliet Hutchison notified. Writer initiated precert with Louis Sahni @ [facility] (for notification purposes only as Managed Medicaid plans are following the precert waiver r/t COVID as mandated by the governor of [place]. Auth # is [id number].

## 2021-09-24 NOTE — PROGRESS NOTES
[facility]   Acute Inpatient Rehab Preadmission Assessment    Patient Name: Anais Patino        MRN: [id number]    : 1960  (64 y.o.)  Gender: female     Admitted from:   [address] [place] [address]  [][facility]   [][facility]   [][facility]   []Outside Admission - Location:                                 [x]Initial         []Updated    Date of Onset / Admission to the acute hospital: 21    Inpatient Rehabilitation Admitting Diagnosis:  Bilat TKA    Did patient have surgery/procedures? []No  [x]Yes:      Procedure(s):  KNEE TOTAL ARTHROPLASTY BILATERAL    Physicians: Leela Mancera for clinical complications: Moderate    Co-morbidities:      1. Acute hypercapnic and hypoxic respiratory failure  2. Leukocytosis  3. HTN  4. Clinical diagnosis of COPD  5. KEVIN  6. Depression  7. Anxiety  8.  Obesity    Financial Information  Primary insurance:  []Medicare [] Medicare HMO  []Commercial insurance    []Medicaid   [x] Medicaid HMO []Workers Compensation   []Personal Pay    Secondary Insurance:  []Medicare [] Medicare HMO  []Commercial insurance    []Medicaid  []Medicaid HMO  []Workers Compensation [x]None    Precautions:   []Cardiac Precautions:    []Total hip precautions:    [x]Weight Bearing status: FWBAT BLE  [x]Safety Precautions/Concerns:  Fall Risk, General Precautions  [x]Visually impaired:  Wears glasses at all times    []Hard of Hearing:     Isolation Precautions:         []Yes  [x]No  If Yes:  [] Droplet  []Contact []Airborne     []VRE     []MRSA     []C-diff         [] TB       [] ESBL [] MDRO          [] Other:        Physiatrist:  []   Dr. Ortega Weathers     []   Dr. Becky Tovar  [x]   Dr. Pasquale Pina  []   Dr. Poly Ayon    Patients Occupation:  Unknown    Reviewed Lab and Diagnostic reports from Current Admission: Yes    Patients Prior Functional  Level: Prior Function  Receives Help From: Family, Friend(s)  ADL Assistance: Independent  Homemaking Assistance: Independent  Ambulation Assistance: Independent  Transfer Assistance: Independent (used 4WW PRN)  Additional Comments: Pt has 2 sons, 1 son recently broke his leg, but his GF is able to assist pt and provide 24/hr care (GF does not work). Pt's second son is due to come home Sunday or Monday but works full time. per pt has firned and family to provide transport and meals. History of current illness, per PM&R Consult:  Wanda Vallejo is a 64 y.o. female with history of bilateral knee osteoarthritis, HTN, KEVIN, depression, and anxiety admitted to [facility] on 9/21/2021.       She initially presented for elective bilateral total knee arthroplasty, which took place on 9/21/21 (Dr. Radha Austin). Hospital course was complicated by acute hypercapnic and hypoxic respiratory failure after surgery, which was treated with bipap.     She currently reports feeling fatigued after therapies. She also notes ongoing pain in the bilateral knees. She denies any numbness/tingling in the bilateral lower limbs. Prognosis: Fair    Current functional status for upper extremity ADLs:  UE Bathing: Contact guard assistance  UE Dressing: Contact guard assistance    Current functional status for lower extremity ADLs:  LE Bathing: Maximum assistance  LE Dressing: Setup (pt able to osorio socks after sock aid education)    Current functional status for bed, chair, wheelchair transfers:  Transfers  Sit to Stand: Moderate Assistance, 2 Person Assistance, Minimal Assistance (Second person Min A x1)  Stand to sit: Contact guard assistance, 2 Person Assistance  Bed to Chair: 2 Person Assistance (CGAx2 for line/lead management and safety)  Stand Pivot Transfers: 2 Person Assistance (CGAx2)  Comment: All transfers performed with RW. VCs for hand placement. Pt continues to require two person A to stand from EOB and BSC at this time. Second person Min A for safety. Pt initialy demos a very forward flexed posture however able to correct with increase time and cues.  Edu provided on the importance of improving posture for energy conservation and stability. Current functional status for toilet transfers: Toilet Transfers  Toilet - Technique: Ambulating (with RW)  Equipment Used: Extra wide bedside commode  Toilet Transfer: 2 Person assistance, Moderate assistance  Toilet Transfers Comments: cues for hand placement for safety; increased time to complete due to pain    Current functional status for locomotion:  Ambulation  Ambulation?: Yes  WB Status: FWBAT BLEs  More Ambulation?: Yes  Ambulation 1  Surface: level tile  Device: Rolling Walker  Other Apparatus: O2  Assistance: Contact guard assistance, 2 Person assistance  Quality of Gait: inc pain with WB, antalgic gait, Wide LUX, pt moves slowly/cautiously and barely clears BLEs from floor. Edu provided on the importance of improving heel strike and increasing WB into BLE's to offload UE's energy expediture. Gait Deviations: Slow Janet, Increased LUX, Decreased step length, Decreased step height  Distance: 6'x2  Comments: Declined sitting up in chair at this time despite edu. Just wanted to get back into bed to take a nap.     Current functional status for comprehension: Complete independence    Current functional status for expression: Complete independence    Current functional status for social interaction: Complete independence    Current functional status for problem solving: Complete independence    Current functional status for memory: Complete independence    Current Deficits R/T Impairment: Impaired Functional Mobility and Decreased ADLs    Required Therapy:   [x] Physical Therapy  [x] Occupational Therapy   [] Speech Therapy, as appropriate    Additional Services:    [x]     [] Recreational Therapy, as appropriate    [x] Nutrition    [] Dialysis  [] Cultural Needs Identified  [] Special Equipment Needs  [] Other    Rehab Justification:  Needs 3 hrs therapy per day or 15 hours per week:  Yes  Identified Rehab Nursing needs: Yes  Intense Interdisciplinary need:  Yes  Need for 24 hr physician supervision:  Yes  Measurable improved quality of life:  Yes  Willingness to participate:  Yes  Medical Necessity:  Yes  Patient able to tolerate care proposed:  Yes    Expected Discharge Destination/Functional Level:  Home with assist  Expected length of time to achieve that level of improvement: 1-2 weeks  Expected Post Discharge Treatments: Home with possible Home Care    Other information relevant to patient's care needs:  N/A    Acute Inpatient Rehabilitation Disclosure Statement will be provided to patient upon admission to ARU with patient's verbalization of understanding. I have reviewed and concur with the findings and results of the pre-admission screening assessment completed by the Inpatient Rehabilitation Admissions Coordinator.

## 2021-09-24 NOTE — CONSULTS
Late Entry - Patient was evaluated this afternoon. Physical Medicine & Rehabilitation  Consult Note      Admitting Physician:  Jason Roe MD    Primary Care Provider:  Yunior Rios     Reason for Consult:  Acute Inpatient Rehabilitation    Chief Complaint:  Bilateral knee osteoarthritis    History of Present Illness:  Referring Provider is requesting an evaluation for appropriate placement upon discharge from acute care. History from chart review and patient. Dufm Cheadle is a 64 y.o. female with history of bilateral knee osteoarthritis, HTN, KEVIN, depression, and anxiety admitted to [facility] on 9/21/2021. She initially presented for elective bilateral total knee arthroplasty, which took place on 9/21/21 (Dr. Ophelia Castaneda). Hospital course was complicated by acute hypercapnic and hypoxic respiratory failure after surgery, which was treated with bipap. She currently reports feeling fatigued after therapies. She also notes ongoing pain in the bilateral knees. She denies any numbness/tingling in the bilateral lower limbs. Review of Systems:  Review of Systems   Constitutional: Positive for malaise/fatigue. Negative for fever. Respiratory: Positive for shortness of breath. Cardiovascular: Negative for chest pain. Gastrointestinal: Negative for abdominal pain. Musculoskeletal: Positive for joint pain. Neurological: Negative for sensory change. Premorbid function:  Independent    Current function:    PT:  Restrictions/Precautions: Weight Bearing, Fall Risk, Up as Tolerated  Implants present? : Metal implants (B TKA, Metal plate and screws in R UE)  Other position/activity restrictions: FWBAT RLE/LLE  Right Lower Extremity Weight Bearing: Weight Bearing As Tolerated  Left Lower Extremity Weight Bearing: Weight Bearing As Tolerated   Transfers  Sit to Stand:  Moderate Assistance, 2 Person Assistance  Stand to sit: Contact guard assistance, 2 Person Assistance  Bed to Chair: 2 Person wanted to get back into bed to take a nap. Surface: level tile  Ambulation 1  Surface: level tile  Device: Rolling Walker  Other Apparatus: O2 (1)  Assistance: Contact guard assistance, 2 Person assistance  Quality of Gait: inc pain with WB, antalgic gait, Wide LUX, pt moves slowly/cautiously and barely clears BLEs from floor  Gait Deviations: Slow Janet, Increased LUX, Decreased step length, Decreased step height  Distance: 4ft fwd/4ft retro and 2 side steps up toward [facility]  Comments: Declined sitting up in chair as she had already been up to chair earlier this morning. Just wanted to get back into bed to take a nap. OT:   ADL  Feeding: Independent  Grooming: Modified independent   UE Bathing: Contact guard assistance  LE Bathing: Maximum assistance  UE Dressing: Contact guard assistance  LE Dressing: Maximum assistance  Toileting: Dependent/Total  Additional Comments: ADL scores based on skilled observation and clinical reasoning, unless otherwise noted. Per PT, pt unable osorio socks, requiring total A to complete. Pt requested urgent need to go to the bathroom, dependent on perineal care due to pain in BLE. Pt was able to wash her face with setup. Assist required due to pain and limited mobility in BLE, reducing ability to complete meaningful activities         Balance  Sitting Balance: Supervision  Standing Balance: Contact guard assistance   Standing Balance  Time: AM: ~1-2 minutes, PM: 2-3 minutes x 2  Activity: self care, transfers, functional mobility  Comment: Bilateral support on RW during perineal care  Functional Mobility  Functional - Mobility Device: Rolling Walker  Activity: Other (To/from bedside commode)  Assist Level: Contact guard assistance  Functional Mobility Comments: AM:4-5 steps forward/back; pt indicated feeling \"unsteady. \" PM: bed>BSC, BSC>chair transfers, Assist with line management     Bed mobility  Rolling to Right: Minimal assistance  Supine to Sit: Minimal assistance  Sit to Supine: Moderate assistance, 2 Person assistance  Scooting: Maximal assistance (x1 to EOB, Mod A in pm)  Comment: Supine-to-sit: HOB elevated (~90-degrees) with use of hand rail, assist with lower body. Sit-to-supine:  Mod A x 2 (upper and lower body) due to increased fatigue  Transfers  Stand Pivot Transfers: Contact guard assistance, 2 Person assistance  Sit to stand: Minimal assistance, 2 Person assistance  Stand to sit: Minimal assistance, 2 Person assistance  Transfer Comments: VCs for hand placement for safety with F understanding; increased time to complete   Toilet Transfers  Toilet - Technique: Stand pivot  Equipment Used: Extra wide bedside commode  Toilet Transfer: Minimal assistance, 2 Person assistance  Toilet Transfers Comments: cues for hand placement for safety; increased time to complete due to pain             SLP:      Past Medical History:        Diagnosis Date    Anxiety     Arthritis     Depression     Hypertension     Knee pain     x10 years    Murmur     PONV (postoperative nausea and vomiting)     Sleep apnea     no machine       Past Surgical History:        Procedure Laterality Date    EYE SURGERY Left 2018    had a tear in left eye- had laser surgery to repair    KNEE SURGERY Bilateral 9/21/2021    KNEE TOTAL ARTHROPLASTY BILATERAL performed by Byron Ramirez MD at [address] [place] [address] Left     SKIN BIOPSY      negative    TUMOR REMOVAL      left foot    ULNAR TUNNEL RELEASE Right     pinched nerve- has a plate and 7 screws        Allergies:    No Known Allergies     Current Medications:   Current Facility-Administered Medications: ipratropium-albuterol (DUONEB) nebulizer solution 1 ampule, 1 ampule, Inhalation, 4x daily  amLODIPine (NORVASC) tablet 10 mg, 10 mg, Oral, Daily  lisinopril (PRINIVIL;ZESTRIL) tablet 20 mg, 20 mg, Oral, Daily  lactated ringers infusion, , IntraVENous, Continuous  sodium chloride flush 0.9 % injection 10 mL, 10 mL, IntraVENous, 2 times per day  sodium chloride flush 0.9 % injection 10 mL, 10 mL, IntraVENous, PRN  0.9 % sodium chloride infusion, 25 mL, IntraVENous, PRN  acetaminophen (TYLENOL) tablet 650 mg, 650 mg, Oral, Q6H  oxyCODONE (ROXICODONE) immediate release tablet 5 mg, 5 mg, Oral, Q4H PRN **OR** oxyCODONE HCl (OXY-IR) immediate release tablet 10 mg, 10 mg, Oral, Q4H PRN  HYDROmorphone (DILAUDID) injection 0.25 mg, 0.25 mg, IntraVENous, Q3H PRN **OR** HYDROmorphone (DILAUDID) injection 0.5 mg, 0.5 mg, IntraVENous, Q3H PRN  sennosides-docusate sodium (SENOKOT-S) 8.6-50 MG tablet 1 tablet, 1 tablet, Oral, BID  magnesium hydroxide (MILK OF MAGNESIA) 400 MG/5ML suspension 30 mL, 30 mL, Oral, Daily PRN  ondansetron (ZOFRAN-ODT) disintegrating tablet 4 mg, 4 mg, Oral, Q8H PRN **OR** ondansetron (ZOFRAN) injection 4 mg, 4 mg, IntraVENous, Q6H PRN  aspirin EC tablet 81 mg, 81 mg, Oral, BID    Family History:   History reviewed. No pertinent family history. Social History:  Lives With: Son  Type of Home: Mobile home (Double wide)  Home Layout: One level  Home Access: Stairs to enter without rails, Ramped entrance (Bilateral hand rails on ramp, not by step)  Entrance Stairs - Number of Steps: 1 step in addition to ramp  Bathroom Shower/Tub: Tub/Shower unit  Bathroom Toilet: Standard (sink close by for support)  Bathroom Accessibility: Walker accessible  Home Equipment: 4 wheeled walker  Receives Help From: Family, Friend(s)  ADL Assistance: Independent  Homemaking Assistance: Independent  Homemaking Responsibilities: Yes  Ambulation Assistance: Independent  Transfer Assistance: Independent (used 4WW PRN)  Active : Yes  Mode of Transportation: SUV  IADL Comments: pt reports sleeping in flat bed and denies any recent falls  Additional Comments: Pt has 2 sons, 1 son recently broke his leg, but his GF is able to assist pt and provide 24/hr care (GF does not work). Pt's second son is due to come home Sunday or Monday but works full time.  per pt has firned and family to provide transport and meals. Social History     Socioeconomic History    Marital status:      Spouse name: None    Number of children: None    Years of education: None    Highest education level: None   Occupational History    None   Tobacco Use    Smoking status: Current Every Day Smoker     Packs/day: 1.00    Smokeless tobacco: Never Used   Vaping Use    Vaping Use: Some days    Substances: Nicotine, Flavoring   Substance and Sexual Activity    Alcohol use: Not Currently    Drug use: Not Currently    Sexual activity: None   Other Topics Concern    None   Social History Narrative    None     Social Determinants of Health     Financial Resource Strain:     Difficulty of Paying Living Expenses:    Food Insecurity:     Worried About Running Out of Food in the Last Year:     Ran Out of Food in the Last Year:    Transportation Needs:     Lack of Transportation (Medical):  Lack of Transportation (Non-Medical):    Physical Activity:     Days of Exercise per Week:     Minutes of Exercise per Session:    Stress:     Feeling of Stress :    Social Connections:     Frequency of Communication with Friends and Family:     Frequency of Social Gatherings with Friends and Family:     Attends Christian Services:     Active Member of Clubs or Organizations:     Attends Club or Organization Meetings:     Marital Status:    Intimate Partner Violence:     Fear of Current or Ex-Partner:     Emotionally Abused:     Physically Abused:     Sexually Abused:        Physical Exam:  /77   Pulse 81   Temp 100.2 °F (37.9 °C)   Resp 16   Ht 5' 2\" (1.575 m)   Wt 258 lb 2.5 oz (117.1 kg)   SpO2 92%   BMI 47.22 kg/m²     GEN: Well developed, well nourished, no acute distress  HEENT: NCAT. EOM grossly intact. Hearing grossly intact. Mucous membranes pink and moist.  RESP: Normal breath sounds with no wheezing, rales, or rhonchi. Respirations WNL and unlabored.   On nasal cannula oxygen. CV: Regular rate and rhythm. No murmurs, rubs, or gallops. ABD: Soft, non-distended, BS+ and equal.  NEURO: Alert but appears tired. Speech fluent. Sensation to light touch intact. MSK:  Muscle tone and bulk are normal bilaterally. Moving bilateral upper limbs with at least antigravity strength. Strength 4/5 with bilateral ankle dorsiflexion and plantarflexion. LIMBS: Edema present in bilateral lower limbs. SKIN: Warm and dry with good turgor. PSYCH: Mood WNL. Affect WNL. Appropriately interactive. Diagnostics:    CBC:   Recent Labs     09/22/21  0536 09/22/21  0949 09/23/21  0549   WBC  --  19.6* 11.5*   RBC  --  4.92 4.39   HGB 14.3 14.8 13.0   HCT 43.4 44.7 39.8   MCV  --  90.9 90.6   RDW  --  13.8 13.9   PLT  --  262 196     BMP:   Recent Labs     09/22/21  1253 09/23/21  0549    137   K 4.6 4.2    106   CO2 24 25   BUN 12 9   CREATININE 0.62 0.54   GLUCOSE 147* 131*      HbA1c: No results found for: LABA1C  BNP: No results for input(s): BNP in the last 72 hours. PT/INR: No results for input(s): PROTIME, INR in the last 72 hours. APTT: No results for input(s): APTT in the last 72 hours. CARDIAC ENZYMES: No results for input(s): CKMB, CKMBINDEX, TROPONINT in the last 72 hours. Invalid input(s): CKTOTAL;3  FASTING LIPID PANEL:No results found for: CHOL, HDL, TRIG  LIVER PROFILE: No results for input(s): AST, ALT, ALB, BILIDIR, BILITOT, ALKPHOS in the last 72 hours. Radiology:  XR CHEST PORTABLE   Final Result   No acute cardiopulmonary findings         XR KNEE LEFT (1-2 VIEWS)   Final Result   1. Status post right knee arthroplasty without evidence of acute   postoperative complication. 2. Status post left knee arthroplasty without evidence of acute postoperative   complication. XR KNEE RIGHT (1-2 VIEWS)   Final Result   1. Status post right knee arthroplasty without evidence of acute   postoperative complication.    2. Status post left knee arthroplasty without evidence of acute postoperative   complication. Impression:    1. Bilateral knee osteoarthritis s/p bilateral total knee arthroplasty on 9/21  2. Acute hypercapnic and hypoxic respiratory failure - improved  3. Leukocytosis  4. HTN  5. Clinical diagnosis of COPD  6. KEVIN  7. Depression  8. Anxiety  9. Obesity    Recommendations:    1. Diagnosis:  Bilateral knee osteoarthritis s/p bilateral TKA  2. Therapy: Has PT/OT needs  3. Medical Necessity: As above  4. Support: Lives with son  5. Rehab Recommendation: The patient will benefit from acute inpatient rehabilitation once medically stable per primary service. Anticipate she will be able to tolerate 3 hours of therapy per day in rehabilitation. The patient requires multidisciplinary rehabilitation treatment including medical management by a PM&R physician, 24 hour rehabilitation nursing, physical therapy, occupational therapy, rehabilitation social work, and nutrition services. Patient and family also require education in post-hospital precautions and home exercise routine, adaptive techniques and deficit compensation strategies, strengthening and conditioning, equipment prescription and instructions in use. 6. DVT Prophylaxis: Aspirin BID    It was my pleasure to evaluate Cookie Botello today. Please call with questions.     Daily Lagunas MD

## 2021-09-24 NOTE — PLAN OF CARE
Problem: Gas Exchange - Impaired:  Goal: Levels of oxygenation will improve  9/24/2021 0204 by Sagar Ross RN  Outcome: Ongoing  Note: Pt oxygenation maintained above 90% throughout the shift. Pt remains on 2L of oxygen during the shift. 9/23/2021 1625 by Maryana Dover RN  Outcome: Ongoing     Problem: Falls - Risk of:  Goal: Will remain free from falls  9/24/2021 0204 by Sagar Ross RN  Outcome: Ongoing  9/23/2021 1625 by Maryana Dover RN  Outcome: Ongoing  Goal: Absence of physical injury  9/24/2021 0204 by Sagar Ross RN  Outcome: Ongoing  9/23/2021 1625 by Maryana Dover RN  Outcome: Ongoing     Problem: Pain:  Goal: Pain level will decrease  9/24/2021 0204 by Sagar Ross RN  Outcome: Ongoing  9/23/2021 1625 by Maryana Dover RN  Outcome: Ongoing  Goal: Control of acute pain  9/24/2021 0204 by Sagar Ross RN  Outcome: Ongoing  Note: Pt was given medications and non pharmacological methods to help control pain. Pt states satisfaction with pain control. 9/23/2021 1625 by Maryana Dover RN  Outcome: Ongoing  Goal: Control of chronic pain  9/24/2021 0204 by Sagar Ross RN  Outcome: Ongoing  9/23/2021 1625 by Maryana Dover RN  Outcome: Ongoing     Problem: Skin Integrity:  Goal: Will show no infection signs and symptoms  9/24/2021 0204 by Sagar Ross RN  Outcome: Ongoing  9/23/2021 1625 by Maryana Dover RN  Outcome: Ongoing  Goal: Absence of new skin breakdown  9/24/2021 0204 by Sagar Ross RN  Outcome: Ongoing  Note: Pt showed no new s/s of skin breakdown throughout the shift. Pt repositioned herself and was up as tolerated. Skin was assessed and maintained integrity.    9/23/2021 1625 by Maryana Dover RN  Outcome: Ongoing     Problem: Musculor/Skeletal Functional Status  Goal: Highest potential functional level  9/24/2021 0204 by Sagar Ross RN  Outcome: Ongoing  9/23/2021 1625 by Maryana Dover RN  Outcome: Ongoing  Goal: Absence of falls  9/24/2021 0204 by Ernestina Abbasi RN  Outcome: Ongoing  Note: Pt remained free of any injury or falls throughout the shift. Bed remained in lowest position, side rails up x2, call light within reach.   9/23/2021 1625 by Nicky Gaming RN  Outcome: Ongoing

## 2021-09-24 NOTE — PROGRESS NOTES
SW spoke to pt regarding discharge. Pt would like to got to ARU. Her back up choice is [facility].

## 2021-09-25 PROBLEM — Z96.653 S/P TOTAL KNEE ARTHROPLASTY, BILATERAL: Status: ACTIVE | Noted: 2021-09-25

## 2021-09-25 LAB
ANION GAP SERPL CALCULATED.3IONS-SCNC: 8 MMOL/L (ref 9–17)
BUN BLDV-MCNC: 12 MG/DL (ref 8–23)
BUN/CREAT BLD: ABNORMAL (ref 9–20)
CALCIUM SERPL-MCNC: 8.8 MG/DL (ref 8.6–10.4)
CHLORIDE BLD-SCNC: 101 MMOL/L (ref 98–107)
CO2: 29 MMOL/L (ref 20–31)
CREAT SERPL-MCNC: 0.53 MG/DL (ref 0.5–0.9)
GFR AFRICAN AMERICAN: >60 ML/MIN
GFR NON-AFRICAN AMERICAN: >60 ML/MIN
GFR SERPL CREATININE-BSD FRML MDRD: ABNORMAL ML/MIN/{1.73_M2}
GFR SERPL CREATININE-BSD FRML MDRD: ABNORMAL ML/MIN/{1.73_M2}
GLUCOSE BLD-MCNC: 139 MG/DL (ref 70–99)
HCT VFR BLD CALC: 37.3 % (ref 36–46)
HEMOGLOBIN: 12.4 G/DL (ref 12–16)
MCH RBC QN AUTO: 30.2 PG (ref 26–34)
MCHC RBC AUTO-ENTMCNC: 33.3 G/DL (ref 31–37)
MCV RBC AUTO: 90.8 FL (ref 80–100)
NRBC AUTOMATED: NORMAL PER 100 WBC
PDW BLD-RTO: 13.6 % (ref 11.5–14.9)
PLATELET # BLD: 233 K/UL (ref 150–450)
PMV BLD AUTO: 7.9 FL (ref 6–12)
POTASSIUM SERPL-SCNC: 4.6 MMOL/L (ref 3.7–5.3)
RBC # BLD: 4.11 M/UL (ref 4–5.2)
SODIUM BLD-SCNC: 138 MMOL/L (ref 135–144)
WBC # BLD: 9.4 K/UL (ref 3.5–11)

## 2021-09-25 PROCEDURE — 85027 COMPLETE CBC AUTOMATED: CPT

## 2021-09-25 PROCEDURE — 97140 MANUAL THERAPY 1/> REGIONS: CPT

## 2021-09-25 PROCEDURE — 97530 THERAPEUTIC ACTIVITIES: CPT

## 2021-09-25 PROCEDURE — 99222 1ST HOSP IP/OBS MODERATE 55: CPT | Performed by: STUDENT IN AN ORGANIZED HEALTH CARE EDUCATION/TRAINING PROGRAM

## 2021-09-25 PROCEDURE — 94761 N-INVAS EAR/PLS OXIMETRY MLT: CPT

## 2021-09-25 PROCEDURE — 36415 COLL VENOUS BLD VENIPUNCTURE: CPT

## 2021-09-25 PROCEDURE — 1180000000 HC REHAB R&B

## 2021-09-25 PROCEDURE — 6370000000 HC RX 637 (ALT 250 FOR IP): Performed by: STUDENT IN AN ORGANIZED HEALTH CARE EDUCATION/TRAINING PROGRAM

## 2021-09-25 PROCEDURE — 97110 THERAPEUTIC EXERCISES: CPT

## 2021-09-25 PROCEDURE — 97535 SELF CARE MNGMENT TRAINING: CPT

## 2021-09-25 PROCEDURE — 97162 PT EVAL MOD COMPLEX 30 MIN: CPT

## 2021-09-25 PROCEDURE — 6370000000 HC RX 637 (ALT 250 FOR IP): Performed by: ORTHOPAEDIC SURGERY

## 2021-09-25 PROCEDURE — 2700000000 HC OXYGEN THERAPY PER DAY

## 2021-09-25 PROCEDURE — 97116 GAIT TRAINING THERAPY: CPT

## 2021-09-25 PROCEDURE — 99253 IP/OBS CNSLTJ NEW/EST LOW 45: CPT | Performed by: INTERNAL MEDICINE

## 2021-09-25 PROCEDURE — 97166 OT EVAL MOD COMPLEX 45 MIN: CPT

## 2021-09-25 PROCEDURE — 80048 BASIC METABOLIC PNL TOTAL CA: CPT

## 2021-09-25 PROCEDURE — 1200000000 HC SEMI PRIVATE

## 2021-09-25 PROCEDURE — 94640 AIRWAY INHALATION TREATMENT: CPT

## 2021-09-25 RX ORDER — LIDOCAINE 40 MG/G
CREAM TOPICAL 4 TIMES DAILY PRN
Status: DISCONTINUED | OUTPATIENT
Start: 2021-09-25 | End: 2021-09-27

## 2021-09-25 RX ADMIN — OXYCODONE HYDROCHLORIDE 10 MG: 10 TABLET ORAL at 19:53

## 2021-09-25 RX ADMIN — OXYCODONE HYDROCHLORIDE 10 MG: 10 TABLET ORAL at 02:51

## 2021-09-25 RX ADMIN — POLYETHYLENE GLYCOL 3350 17 G: 17 POWDER, FOR SOLUTION ORAL at 07:50

## 2021-09-25 RX ADMIN — DOCUSATE SODIUM 50MG AND SENNOSIDES 8.6MG 1 TABLET: 8.6; 5 TABLET, FILM COATED ORAL at 20:49

## 2021-09-25 RX ADMIN — ASPIRIN 81 MG: 81 TABLET, COATED ORAL at 07:49

## 2021-09-25 RX ADMIN — OXYCODONE HYDROCHLORIDE 10 MG: 10 TABLET ORAL at 09:59

## 2021-09-25 RX ADMIN — AMLODIPINE BESYLATE 10 MG: 10 TABLET ORAL at 07:48

## 2021-09-25 RX ADMIN — DOCUSATE SODIUM 50MG AND SENNOSIDES 8.6MG 1 TABLET: 8.6; 5 TABLET, FILM COATED ORAL at 12:19

## 2021-09-25 RX ADMIN — MAGNESIUM HYDROXIDE 30 ML: 400 SUSPENSION ORAL at 20:49

## 2021-09-25 RX ADMIN — LISINOPRIL 20 MG: 20 TABLET ORAL at 07:49

## 2021-09-25 RX ADMIN — IPRATROPIUM BROMIDE AND ALBUTEROL SULFATE 1 AMPULE: .5; 3 SOLUTION RESPIRATORY (INHALATION) at 20:05

## 2021-09-25 RX ADMIN — IPRATROPIUM BROMIDE AND ALBUTEROL SULFATE 1 AMPULE: .5; 3 SOLUTION RESPIRATORY (INHALATION) at 08:08

## 2021-09-25 RX ADMIN — STANDARDIZED SENNA CONCENTRATE 17.2 MG: 8.6 TABLET ORAL at 18:12

## 2021-09-25 RX ADMIN — IPRATROPIUM BROMIDE AND ALBUTEROL SULFATE 1 AMPULE: .5; 3 SOLUTION RESPIRATORY (INHALATION) at 15:13

## 2021-09-25 RX ADMIN — ASPIRIN 81 MG: 81 TABLET, COATED ORAL at 20:49

## 2021-09-25 ASSESSMENT — PAIN DESCRIPTION - FREQUENCY
FREQUENCY: CONTINUOUS

## 2021-09-25 ASSESSMENT — ENCOUNTER SYMPTOMS
ABDOMINAL PAIN: 0
SHORTNESS OF BREATH: 1
DOUBLE VISION: 0
NAUSEA: 0
BLURRED VISION: 0

## 2021-09-25 ASSESSMENT — PAIN DESCRIPTION - DESCRIPTORS
DESCRIPTORS: DULL;ACHING
DESCRIPTORS_2: CRAMPING
DESCRIPTORS: ACHING
DESCRIPTORS_2: CRAMPING
DESCRIPTORS: ACHING

## 2021-09-25 ASSESSMENT — PAIN SCALES - GENERAL
PAINLEVEL_OUTOF10: 10
PAINLEVEL_OUTOF10: 8
PAINLEVEL_OUTOF10: 4
PAINLEVEL_OUTOF10: 7
PAINLEVEL_OUTOF10: 4
PAINLEVEL_OUTOF10: 5
PAINLEVEL_OUTOF10: 8

## 2021-09-25 ASSESSMENT — PAIN DESCRIPTION - LOCATION
LOCATION_2: HAND
LOCATION: KNEE
LOCATION_2: HAND
LOCATION: BACK;KNEE
LOCATION: KNEE

## 2021-09-25 ASSESSMENT — PAIN DESCRIPTION - ORIENTATION
ORIENTATION: RIGHT;LEFT
ORIENTATION_2: RIGHT
ORIENTATION_2: RIGHT;LEFT
ORIENTATION: LEFT;RIGHT
ORIENTATION: RIGHT;LEFT

## 2021-09-25 ASSESSMENT — PAIN DESCRIPTION - PROGRESSION: CLINICAL_PROGRESSION_2: GRADUALLY WORSENING

## 2021-09-25 ASSESSMENT — PAIN DESCRIPTION - INTENSITY
RATING_2: 3
RATING_2: 7

## 2021-09-25 ASSESSMENT — PAIN DESCRIPTION - DURATION
DURATION_2: INTERMITTENT
DURATION_2: INTERMITTENT

## 2021-09-25 ASSESSMENT — PAIN DESCRIPTION - PAIN TYPE
TYPE: SURGICAL PAIN

## 2021-09-25 NOTE — PROGRESS NOTES
Physical Therapy    Facility/Department: [facility]  Initial Assessment    NAME: Lorne Urbano  : 1960  MRN: [id number]    Date of Service: 2021    Discharge Recommendations:  Patient would benefit from continued therapy after discharge   PT Equipment Recommendations  Equipment Needed: Yes  Mobility Devices: Deepthi Inches: Rolling  Other: Need for RW TBD, pt has a 4WW but may require a more stable AD upon d/c    Assessment   Body structures, Functions, Activity limitations: Decreased functional mobility ; Decreased ROM; Decreased strength;Decreased endurance;Decreased balance; Increased pain  Assessment: pt performs bed mobility with MaxAx1, transfers with Mod-MaxAx1 with RW, Amb x45' with CGA, VCs for technique, and RW; Requires MinAx1 for negotiating 1 step and overall demonstrates BLE weakness, decreased ROM, low endurance, and decreased balance. PT services are warranted to address the following impairments and progress patient towards prior level of indepedence to allow for a safe return home. Treatment Diagnosis: impaired functional mobility secondary to Bilateral TKA procedure  Specific instructions for Next Treatment: transfer pt to sitting up in comfortable chair, seated ROM exercises for Bilateral knees. Prognosis: Fair  Decision Making: Medium Complexity  History: H/O HTN, Sleep apnea, current smoker, depression/anxiety. Exam: ROM, MMT, Balance, and functional mobility assessments. Clinical Presentation: pt presents with BLE deficits in ROM, strength, and increased pain. Impaired balance and mobility requiring 1-2 person assist for mobility at this time. REQUIRES PT FOLLOW UP: Yes  Activity Tolerance  Activity Tolerance: Patient limited by pain; Patient limited by endurance  Activity Tolerance: Frequent seated rest breaks provided, pt's SpO2 levels remain in 90-93% range during mobility activities while pt on 2L/min of O2 via nasal cannula. pt demo's dyspnea with most tasks.         Patient Diagnosis(es): There were no encounter diagnoses. has a past medical history of Anxiety, Arthritis, Depression, Hypertension, Knee pain, Murmur, PONV (postoperative nausea and vomiting), and Sleep apnea. has a past surgical history that includes Rotator cuff repair (Left); tumor removal; Ulnar tunnel release (Right); skin biopsy; eye surgery (Left, 2018); and knee surgery (Bilateral, 9/21/2021). Restrictions  Restrictions/Precautions  Restrictions/Precautions: Weight Bearing, Surgical Protocols, Fall Risk  Required Braces or Orthoses?: No  Implants present? : Metal implants (B TKA, Metal plate and screws in R UE)  Lower Extremity Weight Bearing Restrictions  Right Lower Extremity Weight Bearing: Weight Bearing As Tolerated  Left Lower Extremity Weight Bearing: Weight Bearing As Tolerated  Position Activity Restriction  Other position/activity restrictions: FWBAT RLE/LLE  Vision/Hearing  Vision: Impaired  Vision Exceptions: Wears glasses at all times  Hearing: Within functional limits     Subjective  General  Chart Reviewed: Yes  Patient assessed for rehabilitation services?: Yes  Additional Pertinent Hx: pt is a 64 y.o. woman recently admitted to hospital for Bilateral TKA procedure on 9/21/21 by Dr. Aniyah Starks. Pt with initial diagnosis of Bilateral knee Degenerative Joint Disease and H/O osteoarthritis, sleep apnea (no machine), and HTN.  Admitted to [facility] on 9/24/21  Response To Previous Treatment: Not applicable  Family / Caregiver Present: No  Referring Practitioner: Dr. Edilberto Ba  Referral Date : 09/24/21  Diagnosis: Aftercare following bilateral knee joint replacement surgery  Follows Commands: Within Functional Limits  General Comment  Comments: OK to proceed with PT eval per Nurse Chyna Lu  Subjective  Subjective: pt reports constipation , increased hand cramping with increased weight bearing   Pain Screening  Patient Currently in Pain: Yes  Pain Assessment  Pain Assessment: 0-10  Pain Objective          PROM RLE (degrees)  RLE PROM: Exceptions  R Knee Flexion 0-145: 4 - 75  R Knee Extension 0: lacking 3 - 4 degrees from 0  AROM RLE (degrees)  RLE AROM: Exceptions  R Knee Flexion 0-145: 4 - 75  R Knee Extension 0: lacking 3 - 4 degrees from 0  PROM LLE (degrees)  LLE PROM: Exceptions  L Knee Flexion 0-145: 0 - 75  L Knee Extension 0: 0  AROM LLE (degrees)  LLE AROM : Exceptions  L Knee Flexion 0-145: 3 - 70  L Knee Extension 0: lacking 2-3 degrees from 0  AROM RUE (degrees)  RUE General AROM: See OT Assessments of UE  AROM LUE (degrees)  LUE General AROM: See OT Assessments of UE  Strength RLE  Comment: Grossly 3-/5 except ankle 4-/5  - limited by in pain  Strength LLE  Comment: Grossly 3-/5 except ankle 4/5 - limited by in pain  Strength RUE  Comment: See OT Eval  Strength LUE  Comment: See OT Eval     Sensation  Overall Sensation Status: WFL (pt denies)  Bed mobility  Scooting: Moderate assistance (Scooting forward to EOB, ModAx1 to scoot hips)  Transfers  Sit to Stand: Moderate Assistance;Maximum Assistance (R [address] with inc fatigue)  Stand to sit: Minimal Assistance  Bed to Chair: Contact guard assistance  Stand Pivot Transfers: Contact guard assistance  Comment: pt performs multiple transfers from bed, w/c, toilet using RW and ModAx1 requiring MaxAx1 at pt becomes fatigued. pt demos toilet transfer x2 requiring inc time void (no BM), pt participates in toilet hygeine care while seated on toilet. Ed on correct hands placement and awareness of surface proximity to improve safety with transfers. Ambulation  Ambulation?: Yes  WB Status: FWBAT BLEs  Ambulation 1  Surface: level tile  Device: Rolling Walker  Other Apparatus: O2  Assistance: Contact guard assistance;2 Person assistance (CGAx1 + W/C follow for 45' distance)  Quality of Gait: Very slow & antalgic gait, Wide LUX, pt moves slowly/cautiously with dec bilateral step height and length.  Heavy BUEs WB on RW  Gait Deviations: Slow Janet; Increased LUX; Decreased step length;Decreased step height  Distance: 15'x3,5', 45'  Comments: pt ambulates multiple short distances within room and hallway with RW and CGA. Dyspnea noted during gait with SpO2 remaining at or slightly above 90% (90-93%). Further amb deferred this date d/t dyspnea and inc fatigue. VCs to increase heel strike, Ed on inc WB through BLEs to improve energy efficency of gait and decrease pain in BUEs. Stairs/Curb  Stairs?: Yes  Stairs  # Steps : 1  Stairs Height: 4\"  Rails: Bilateral  Assistance: Minimal assistance  Comment: pt negotiates one step with MinAx1 to assist with controlled lowering off the step. Balance  Posture: Fair  Sitting - Static: Good  Sitting - Dynamic: Fair  Standing - Static: Fair;+ (with RW)  Standing - Dynamic: Fair (with RW)  Comments: Standing balance assessed with RW, No LOB to report  Exercises  Knee Active Range of Motion: Seated heel slides x5 R/L with towel under foot to promote Knee ext/flexion ROM  Ankle Pumps: x10 R/L     Plan   Plan  Times per week: 1.5 hours per day, 5-7 days per week  Times per day: Twice a day  Plan weeks: 1-2 weeks  Specific instructions for Next Treatment: transfer pt to sitting up in comfortable chair, seated ROM exercises for Bilateral knees.    Current Treatment Recommendations: Strengthening, ROM, Balance Training, Functional Mobility Training, Transfer Training, Endurance Training, Gait Training, Stair training, Pain Management, Home Exercise Program, Safety Education & Training, Patient/Caregiver Education & Training, Equipment Evaluation, Education, & procurement  Safety Devices  Type of devices: Call light within reach, Gait belt, Patient at risk for falls, Left in bed, Nurse notified, All fall risk precautions in place  Restraints  Initially in place: No    G-Code       OutComes Score  2 Minute Walk Test - pt amb 27' in 2 Minutes = Gait speed of 0.068 m/s        Goals  Short term goals  Time Frame for Short term goals: 1 week  Short term goal 1: pt to demonstrate Bilateral Knee AROM of 90 degrees knee flexion to demo improved joint mechanics for safe functional mobility   Short term goal 2: pt to improve overall BLE strength by 1/2 manual muscle grade to improve strength and safety during functional transfers and mobility  Short term goal 3: pt to improve dynamic standing balance with RW from fair to fair+ to improve safety with functional mobility   Short term goal 4: pt to ambulate 48' & 2 turns with RW and CGA to improve mobility for household distances  Short term goal 5: pt to improve Bilat knee extension AROM to 0 degrees to improve joint mechanics for safe amb and transfers  Short term goal 6: pt to perform all bed mobility on flat bed with or w/o rails and MinAx1 to decrease burden of care  Long term goals  Time Frame for Long term goals : Until d/c  Long term goal 1: pt to demonstrate Bilateral Knee AROM of 105 degrees knee flexion to demo improved joint mechanics for safe functional mobility   Long term goal 2: pt to improve overall BLE strength to 4/5 to improve strength and safety during functional transfers and mobility  Long term goal 3: pt to ambulate 100' with RW Mod I to improve safety and tolerance to amb household distances  Long term goal 4: pt to demo negotiation of 10' ramp with RW and 1 step with SBA to allow for safe home access  Long term goal 5: pt to perform all bed mobility on flat bed with or w/o rails Mod I to decrease burden of care  Long term goal 6: pt to independently perform HEP strengthening and ROM with good technique to demo indepence with self-care routine  Long term goal 7: pt to amb 76' with RW in 2 minutes to demo improved gait speed to improve safety with household ambulation  Patient Goals   Patient goals : to return home safely       Therapy Time   Individual Concurrent Group Co-treatment   Time In 828         Time Out 953         Minutes 85         Timed Code Treatment Minutes: [id number] [facility], PT

## 2021-09-25 NOTE — CONSULTS
Diagnoses/plan:     1. Bilateral knee osteoarthritis s/p bilateral total knee arthroplasty:  Procedure done 9/21/21. WBAT to the bilateral lower limbs. PT/OT for gait, mobility, strengthening, endurance, ADLs, and self care. Pain control with tylenol and oxycodone as needed. Follow up with Dr. Mikala Jiménez as outpatient. 2. Bilateral hand cramps:  Patient reports that she has had these intermittently in the past.  Electrolytes WNL. Will check magnesium. Heat/ice as needed. Added lidocaine cream as needed for pain. 3. HTN:  On amlodipine, lisinopril  4. Clinical COPD:  Per pulmonology. On duo-neb 4 times daily. Will need PFTs as outpatient. 5. KEVIN:  Did not tolerate nocturnal bipap. Continue nasal cannula oxygen at night (at least 2-3 L to keep O2 sat > 88%). Will need sleep study as outpatient. 6. Depression, anxiety:  Not currently on medication  7. Morbid obesity:  BMI 46.09  8. Bowel Management: Miralax daily, senokot prn, dulcolax prn, milk of magnesia prn.  9. DVT Prophylaxis:  aspirin BID  10. Internal Medicine for medical management          Past and Surgical hx as in H and P  Social History:     Tobacco:    reports that she has been smoking. She has been smoking about 1.00 pack per day. She has never used smokeless tobacco.  Alcohol:      reports previous alcohol use. Drug Use:  reports previous drug use. Review of Systems:     POSITIVE AND NEGATIVES AS DESCRIBED IN HISTORY OF PRESENT ILLNESS ;  IN ADDITION ;  Review of Systems          All other systems negative                Physical Exam:     Physical Exam   Vitals:    09/25/21 0828 09/25/21 1036 09/25/21 1436 09/25/21 1515   BP:       Pulse:       Resp:       Temp:       TempSrc:       SpO2: 91% 93% 93% 92%   Weight:       Height:                       Body mass index is 46.09 kg/m².      General Appearance:   -, CO-OPERATIVE ,                                                        Pulmonary/Chest:        Clear to auscultation bilaterally .                                         No wheezes, rales or rhonchi . Cardiovascular:            Normal rate, regular rhythm,                                          No murmur or  Gallop . Abdomen:                       Soft, non-tender                                                                                    Extremities:                    No Edema . Neuromuskuloskeletal    ... Data:     URINE ANALYSIS: No results found for: LABURIN     CBC:  Lab Results   Component Value Date    WBC 9.4 09/25/2021    HGB 12.4 09/25/2021     09/25/2021        BMP:    Lab Results   Component Value Date     09/25/2021    K 4.6 09/25/2021     09/25/2021    CO2 29 09/25/2021    BUN 12 09/25/2021    CREATININE 0.53 09/25/2021    GLUCOSE 139 09/25/2021      LIVER PROFILE:No results found for: ALT, AST, PROT, BILITOT, BILIDIR, LABALBU          Radiology:         Medications: Allergies:  No Known Allergies    Current Meds:   Scheduled Meds:    aspirin  81 mg Oral BID    sennosides-docusate sodium  1 tablet Oral BID    amLODIPine  10 mg Oral Daily    ipratropium-albuterol  1 ampule Inhalation 4x daily    lisinopril  20 mg Oral Daily    polyethylene glycol  17 g Oral Daily     Continuous Infusions:   PRN Meds: lidocaine, magnesium hydroxide, oxyCODONE **OR** oxyCODONE, acetaminophen, senna, bisacodyl        Assessment :       Assessment Dx  Principal Problem:    S/P total knee arthroplasty, bilateral  Active Problems: Morbid obesity ([address]    History of tobacco use    COPD (chronic obstructive pulmonary disease) ([facility] [address]    Aftercare following bilateral knee joint replacement surgery  Resolved Problems:    * No resolved hospital problems. *              Plan:     Patient s/p bilateral knee arthroplasty   Has advanced COPD obstructive sleep apnea morbid obesity   Dissecting down in rehab     Thanks for consulting us .   Will monitor vitals and clinical course , and  Optimize therapy  as needed . Benito Jenkins MD    Copy sent to Dr. Taiwo Gauthier that this chart was generated using voice recognition Dragon dictation software. Although every effort was made to ensure the accuracy of this automated transcription, some errors in transcription may have occurred.

## 2021-09-25 NOTE — PROGRESS NOTES
Physical Therapy  [facility]: CASIMIRO KRISHNAMURTHY  Acute Rehabilitation Physical Therapy Progress Note    Date: 21  Patient Name: Ambar Burgos       Room: 7916/3114-02  MRN: [id number]   Account: [de-identified]   : 1960  (64 y.o.) Gender: female     Referring Practitioner: Sharmila Busch MD  Diagnosis: B TKA 21  Past Medical History:  has a past medical history of Anxiety, Arthritis, Depression, Hypertension, Knee pain, Murmur, PONV (postoperative nausea and vomiting), and Sleep apnea. Past Surgical History:   has a past surgical history that includes Rotator cuff repair (Left); tumor removal; Ulnar tunnel release (Right); skin biopsy; eye surgery (Left, 2018); and knee surgery (Bilateral, 2021). Additional Pertinent Hx: pt is a 64 y.o. woman recently admitted to hospital for Bilateral TKA procedure on 21 by Dr. Andreia Garcia. Pt with initial diagnosis of Bilateral knee Degenerative Joint Disease and H/O osteoarthritis, sleep apnea (no machine), and HTN. Admitted to [facility] on 21    Overall Orientation Status: Within Functional Limits  Restrictions/Precautions  Restrictions/Precautions: Weight Bearing;Surgical Protocols; Fall Risk  Required Braces or Orthoses?: No  Implants present? : Metal implants (B TKA, Metal plate and screws in R UE)  Lower Extremity Weight Bearing Restrictions  Right Lower Extremity Weight Bearing: Weight Bearing As Tolerated  Left Lower Extremity Weight Bearing: Weight Bearing As Tolerated  Position Activity Restriction  Other position/activity restrictions: FWBAT RLE/LLE    Subjective: pt reports constipation , increased hand cramping with increased weight bearing        Vital Signs  Patient Currently in Pain: Yes  Pain Assessment: 0-10  Pain Level: 4  Patient's Stated Pain Goal: No pain  Pain Type: Surgical pain  Pain Location: Knee  Pain Orientation: Right;Left  Pain Descriptors: Aching  Pain Frequency: Continuous  Non-Pharmaceutical Pain Intervention(s): Other (Comment) (activity as tolerated)     Oxygen Therapy  SpO2: 93 %  Pulse Oximeter Device Mode: Intermittent  Pulse Oximeter Device Location: Finger  O2 Device: Nasal cannula  O2 Flow Rate (L/min): 2 L/min  Patient Observation  Observations: pt resting supine in bed with HOB elevated, Adhesive dressings intact on Bilateral Knees, 2L O2 via nasal cannula       Bed Mobility:   Bed Mobility  Sit to Supine: Maximal assistance (LEs)    Transfers:  Sit to Stand: Minimal Assistance  Stand to sit: Contact guard assistance      Stairs/Curb  Stairs?: No            FIMS:      BALANCE Posture: Fair  Sitting - Static: Good  Sitting - Dynamic: Fair  Standing - Static: Fair;+ (with RW)  Standing - Dynamic: Fair (with RW)  Comments: Standing balance assessed with RW, No LOB to report    EXERCISES    Other exercises?: Yes  Other exercises 1: PROM bilateral LE seated knee ext, flexion, gastrocnemius   Other exercises 2: A/AROM seated knee ext, flexion , ankle DF x 10  Other Activities  Comment: pt requires frequent rest breaks and demonstrates slow guarded movements with functional transfers, post treatment pt positioned supine , heels off loaded at calves encouraging increased knee extension, and Cryo cuffs bilateral knees , nursing notified          Activity Tolerance: Patient limited by pain, Patient limited by endurance  PT Equipment Recommendations  Equipment Needed: Yes  Walker: Rolling  Other: Need for RW TBD, pt has a 0UQ but may require a more stable AD upon d/c       Patient Education  New Education Provided:    Learner:patient  Method: explanation       Outcome: needs reinforcement     Current Treatment Recommendations: Strengthening, ROM, Balance Training, Functional Mobility Training, Transfer Training, Endurance Training, Gait Training, Stair training, Pain Management, Home Exercise Program, Safety Education & Training, Patient/Caregiver Education & Training, Equipment Evaluation, Education, & procurement    Conditions Requiring Skilled Therapeutic Intervention  Body structures, Functions, Activity limitations: Decreased functional mobility ; Decreased ROM; Decreased strength;Decreased endurance;Decreased balance; Increased pain  Treatment Diagnosis: impaired functional mobility secondary to Bilateral TKA procedure  Prognosis: Fair  Decision Making: Medium Complexity  History: H/O HTN, Sleep apnea, current smoker, depression/anxiety. Clinical Presentation: pt presents with BLE deficits in ROM, strength, and increased pain. Impaired balance and mobility requiring 1-2 person assist for mobility at this time.   REQUIRES PT FOLLOW UP: Yes  Discharge Recommendations: Patient would benefit from continued therapy after discharge    Goals  Short term goals  Time Frame for Short term goals: 1 week  Short term goal 1: pt to demonstrate Bilateral Knee AROM of 90 degrees knee flexion to demo improved joint mechanics for safe functional mobility   Short term goal 2: pt to improve overall BLE strength by 1/2 manual muscle grade to improve strength and safety during functional transfers and mobility  Short term goal 3: pt to improve dynamic standing balance with RW from fair to fair+ to improve safety with functional mobility   Short term goal 4: pt to ambulate 50' & 2 turns with RW and CGA to improve mobility for household distances  Short term goal 5: pt to improve Bilat knee extension AROM to 0 degrees to improve joint mechanics for safe amb and transfers  Short term goal 6: pt to perform all bed mobility on flat bed with or w/o rails and MinAx1 to decrease burden of care  Long term goals  Time Frame for Long term goals : Until d/c  Long term goal 1: pt to demonstrate Bilateral Knee AROM of 105 degrees knee flexion to demo improved joint mechanics for safe functional mobility   Long term goal 2: pt to improve overall BLE strength to 4/5 to improve strength and safety during functional transfers and mobility  Long term goal 3: pt to ambulate 100' with RW Mod I to improve safety and tolerance to amb household distances  Long term goal 4: pt to demo negotiation of 10' ramp with RW and 1 step with SBA to allow for safe home access  Long term goal 5: pt to perform all bed mobility on flat bed with or w/o rails Mod I to decrease burden of care  Long term goal 6: pt to independently perform HEP strengthening and ROM with good technique to demo indepence with self-care routine  Long term goal 7: pt to amb 76' with RW in 2 minutes to demo improved gait speed to improve safety with household ambulation    Electronically signed by Zandra Norris PTA on 9/25/21 at 2:48 PM EDT     09/25/21 1447   PT Individual Minutes   Time In 3369   Time Out 1422   Minutes 61

## 2021-09-25 NOTE — H&P
Physical Medicine & Rehabilitation History and Physical  [facility] Acute Rehabilitation Unit     Primary care provider:  Narcisa Srinivasan     Chief Complaint and Reason for Rehabilitation Admission:   ADL and mobility deficits secondary to knee osteoarthritis s/p bilateral total knee arthroplasty    History of Present Illness:  Anais Patino is a 64 y.o. female with history of bilateral knee osteoarthritis, HTN, KEVIN, depression, and anxiety admitted to the [address] [place] [address] on 9/24/2021. She was originally admitted to [facility] on 9/21/21. She initially presented for elective bilateral total knee arthroplasty, which took place on 9/21/21 (Dr. Venita Sutherland). She is WBAT to the bilateral lower limbs. Hospital course was complicated by acute hypercapnic and hypoxic respiratory failure after surgery, which was treated with bipap and nasal cannula oxygen. She is currently requiring assistance for self-care activities and mobility prompting this admission. She reports feeling \"exhausted\" after therapies today. She notes cramping in the hands, which she has had intermittently in the past, although she states that the cramping and pain is lasting longer than it did before. She also reports chronic shortness of breath with exertion. He last bowel movement was 9/20/21, but she denies any abdominal pain or nausea. Premorbid function:  Independent    Current Function:  PT:  Restrictions/Precautions: Weight Bearing, Surgical Protocols, Fall Risk  Implants present? : Metal implants (B TKA, metal plate and screws in RUE)  Other position/activity restrictions: FWBAT RLE/LLE  Right Lower Extremity Weight Bearing: Weight Bearing As Tolerated  Left Lower Extremity Weight Bearing: Weight Bearing As Tolerated   Transfers  Sit to Stand:  Moderate Assistance, Maximum Assistance (GURWINDER Bain 56 with inc fatigue)  Stand to sit: Minimal Assistance  Bed to Chair: Contact guard assistance  Stand Pivot Transfers: Contact guard assistance  Comment: pt performs multiple transfers from bed, w/c, toilet using RW and ModAx1 requiring MaxAx1 at pt becomes fatigued. pt demos toilet transfer x2 requiring inc time void (no BM), pt participates in toilet hygeine care while seated on toilet. Ed on correct hands placement and awareness of surface proximity to improve safety with transfers. WB Status: FWBAT BLEs  Ambulation 1  Surface: level tile  Device: Rolling Walker  Other Apparatus: O2  Assistance: Contact guard assistance, 2 Person assistance (CGAx1 + W/C follow for 45' distance)  Quality of Gait: Very slow & antalgic gait, Wide LUX, pt moves slowly/cautiously with dec bilateral step height and length. Heavy BUEs WB on RW  Gait Deviations: Slow Janet, Increased LUX, Decreased step length, Decreased step height  Distance: 15'x3,5', 45'  Comments: pt ambulates multiple short distances within room and hallway with RW and CGA. Dyspnea noted during gait with SpO2 remaining at or slightly above 90% (90-93%). Further amb deferred this date d/t dyspnea and inc fatigue. VCs to increase heel strike, Ed on inc WB through BLEs to improve energy efficency of gait and decrease pain in BUEs. Transfers  Sit to Stand: Moderate Assistance, Maximum Assistance (GURWINDER Bain 56 with inc fatigue)  Stand to sit: Minimal Assistance  Bed to Chair: Contact guard assistance  Stand Pivot Transfers: Contact guard assistance  Comment: pt performs multiple transfers from bed, w/c, toilet using RW and ModAx1 requiring MaxAx1 at pt becomes fatigued. pt demos toilet transfer x2 requiring inc time void (no BM), pt participates in toilet hygeine care while seated on toilet. Ed on correct hands placement and awareness of surface proximity to improve safety with transfers.   Ambulation  Ambulation?: Yes  WB Status: FWBAT BLEs  Ambulation 1  Surface: level tile  Device: Rolling Walker  Other Apparatus: O2  Assistance: Contact guard assistance, 2 Person assistance (CGAx1 + W/C follow for 45' distance)  Quality of Gait: Very slow & antalgic gait, Wide LUX, pt moves slowly/cautiously with dec bilateral step height and length. Heavy BUEs WB on RW  Gait Deviations: Slow Janet, Increased LUX, Decreased step length, Decreased step height  Distance: 15'x3,5', 45'  Comments: pt ambulates multiple short distances within room and hallway with RW and CGA. Dyspnea noted during gait with SpO2 remaining at or slightly above 90% (90-93%). Further amb deferred this date d/t dyspnea and inc fatigue. VCs to increase heel strike, Ed on inc WB through BLEs to improve energy efficency of gait and decrease pain in BUEs. Surface: level tile  Ambulation 1  Surface: level tile  Device: Rolling Walker  Other Apparatus: O2  Assistance: Contact guard assistance, 2 Person assistance (CGAx1 + W/C follow for 45' distance)  Quality of Gait: Very slow & antalgic gait, Wide LUX, pt moves slowly/cautiously with dec bilateral step height and length. Heavy BUEs WB on RW  Gait Deviations: Slow Janet, Increased LUX, Decreased step length, Decreased step height  Distance: 15'x3,5', 45'  Comments: pt ambulates multiple short distances within room and hallway with RW and CGA. Dyspnea noted during gait with SpO2 remaining at or slightly above 90% (90-93%). Further amb deferred this date d/t dyspnea and inc fatigue. VCs to increase heel strike, Ed on inc WB through BLEs to improve energy efficency of gait and decrease pain in BUEs. OT:   ADL  Feeding: Modified independent  (Dentures)  Grooming: Setup (Pt washed face and brushed her hair)  UE Bathing: Setup (Supine in bed)  LE Bathing: Maximum assistance (Supine in bed)  UE Dressing: None (No clothing available)  LE Dressing: Dependent/Total (Socks only;  No other clothing available)  Toileting: Stand by assistance (Hygiene in sitting per PT; No clothing to manage)  Additional Comments: ADL completed bedside this AM 2* pt reporting extreme screws        Allergies:    Patient has no known allergies. Medications  Scheduled Meds:    aspirin  81 mg Oral BID    sennosides-docusate sodium  1 tablet Oral BID    amLODIPine  10 mg Oral Daily    ipratropium-albuterol  1 ampule Inhalation 4x daily    lisinopril  20 mg Oral Daily    polyethylene glycol  17 g Oral Daily     Continuous Infusions:   PRN Meds: lidocaine, magnesium hydroxide, oxyCODONE **OR** oxyCODONE, acetaminophen, senna, bisacodyl     Family History:       Problem Relation Age of Onset    Pacemaker Mother     Hypertension Father     Diabetes Father        Social History:  Lives With: Son  Type of Home: Mobile home (Double wide)  Home Layout: One level  Home Access: Stairs to enter without rails, Ramped entrance (B hand rails on ramp, not by steps)  Entrance Stairs - Number of Steps: 1 step in addition to ramp  Bathroom Shower/Tub: Tub/Shower unit  Bathroom Toilet: Standard (sink close by for support)  Bathroom Accessibility: Walker accessible  Home Equipment: 4 wheeled walker  Receives Help From: Family, Friend(s)  ADL Assistance: Independent  Homemaking Assistance: Independent  Homemaking Responsibilities: Yes  Ambulation Assistance: Independent  Transfer Assistance: Independent (used 4WW PRN)  Active : Yes  Mode of Transportation: SUV  IADL Comments: pt reports sleeping in flat bed and denies any recent falls  Additional Comments: Pt has 2 sons, 1 son recently broke his leg, but his GF is able to assist pt and provide 24/hr care (GF does not work). Pt's second son is due to come home Sunday or Monday but works full time. per pt has friends and family to provide transport and meals.   Social History     Socioeconomic History    Marital status:      Spouse name: None    Number of children: None    Years of education: None    Highest education level: None   Occupational History    None   Tobacco Use    Smoking status: Current Every Day Smoker     Packs/day: 1.00    Smokeless tobacco: Never Used   Vaping Use    Vaping Use: Some days    Substances: Nicotine, Flavoring   Substance and Sexual Activity    Alcohol use: Not Currently    Drug use: Not Currently    Sexual activity: None   Other Topics Concern    None   Social History Narrative    None     Social Determinants of Health     Financial Resource Strain:     Difficulty of Paying Living Expenses:    Food Insecurity:     Worried About Running Out of Food in the Last Year:     Ran Out of Food in the Last Year:    Transportation Needs:     Lack of Transportation (Medical):  Lack of Transportation (Non-Medical):    Physical Activity:     Days of Exercise per Week:     Minutes of Exercise per Session:    Stress:     Feeling of Stress :    Social Connections:     Frequency of Communication with Friends and Family:     Frequency of Social Gatherings with Friends and Family:     Attends Spiritism Services:     Active Member of Clubs or Organizations:     Attends Club or Organization Meetings:     Marital Status:    Intimate Partner Violence:     Fear of Current or Ex-Partner:     Emotionally Abused:     Physically Abused:     Sexually Abused:          Review of Systems:  Review of Systems   Constitutional: Positive for malaise/fatigue. Negative for fever. HENT: Negative for hearing loss. Eyes: Negative for blurred vision and double vision. Respiratory: Positive for shortness of breath. Cardiovascular: Negative for chest pain. Gastrointestinal: Negative for abdominal pain and nausea. Genitourinary:        No change in bladder control   Musculoskeletal:        +hand cramps   Skin: Negative for rash. Neurological: Negative for dizziness and headaches. Physical Exam:  BP (!) 142/75   Pulse 71   Temp 98.1 °F (36.7 °C)   Resp 18   Ht 5' 2\" (1.575 m)   Wt 252 lb (114.3 kg)   SpO2 93%   BMI 46.09 kg/m²     GEN: Well developed, well nourished, no acute distress  HEENT: NCAT.   EOM grossly intact. Hearing grossly intact. Mucous membranes pink and moist.  RESP: Normal breath sounds with no wheezing, rales, or rhonchi. Respirations WNL and unlabored. On nasal cannula oxygen. CV: Regular rate and rhythm. No murmurs, rubs, or gallops. ABD: Soft, non-distended, BS+ and equal.  NEURO: Alert. Speech fluent. Sensation to light touch intact. MSK:  Muscle tone and bulk are normal bilaterally. Strength 4/5 in bilateral upper limbs. Strength 4/5 with bilateral ankle dorsiflexion and plantarflexion. LIMBS: Edema present in bilateral lower limbs. SKIN: Warm and dry with good turgor. PSYCH: Mood WNL. Affect WNL. Appropriately interactive. Diagnostics:     CBC:   Recent Labs     09/23/21  0549 09/25/21  0659   WBC 11.5* 9.4   RBC 4.39 4.11   HGB 13.0 12.4   HCT 39.8 37.3   MCV 90.6 90.8   RDW 13.9 13.6    233     BMP:   Recent Labs     09/23/21  0549 09/25/21  0659    138   K 4.2 4.6    101   CO2 25 29   BUN 9 12   CREATININE 0.54 0.53   GLUCOSE 131* 139*     HbA1c: No results found for: LABA1C  BNP: No results for input(s): BNP in the last 72 hours. PT/INR: No results for input(s): PROTIME, INR in the last 72 hours. APTT: No results for input(s): APTT in the last 72 hours. CARDIAC ENZYMES: No results for input(s): CKMB, CKMBINDEX, TROPONINT in the last 72 hours. Invalid input(s): CKTOTAL;3  FASTING LIPID PANEL:No results found for: CHOL, HDL, TRIG  LIVER PROFILE: No results for input(s): AST, ALT, ALB, BILIDIR, BILITOT, ALKPHOS in the last 72 hours. Imaging:  XR right knee, 7/7/21:  Narrative   X-rays taken today reviewed by me show standing AP of the right knee as    well as a lateral.  Patient has significant varus gonarthrosis with    approaching bone-on-bone disease medial compartment of the right knee with    acute spur formation both medially and laterally given overall varus    disposition.  Patient also has a a severe patellofemoral arthrosis as    well. XR left knee, 7/7/21:  Narrative   X-rays taken today reviewed by me show standing AP and lateral left knee.     Patient has severe varus gonarthrosis left knee with bone-on-bone    apposition and early tibial wear with humongous osteophytes medially and    laterally.  Patient also has severe patellofemoral arthrosis as well       XR bilateral knees, 9/21/21:  Impression   1. Status post right knee arthroplasty without evidence of acute   postoperative complication. 2. Status post left knee arthroplasty without evidence of acute postoperative   complication. CXR, 9/22/21:  Impression   No acute cardiopulmonary findings       Principal Diagnosis/plan:  The patient is a 64y.o.-year-old with ADL and mobility deficits secondary to knee osteoarthritis s/p bilateral total knee arthroplasty. She will require close medical monitoring for the comorbidities listed below. She will benefit from intensive interdisciplinary therapies and rehab nursing care and is appropriate for inpatient rehabilitation. The post admission physician evaluation (STEPHANIE) is consistent with the pre-admission assessment. See above findings to reflect the elements required in the STEPHANIE. Patient's admitting condition is consistent with the findings of the preadmission assessment by the rehabilitation admissions coordinator. Diagnoses/plan:    1. Bilateral knee osteoarthritis s/p bilateral total knee arthroplasty:  Procedure done 9/21/21. WBAT to the bilateral lower limbs. PT/OT for gait, mobility, strengthening, endurance, ADLs, and self care. Pain control with tylenol and oxycodone as needed. Follow up with Dr. Mary Kay Pires as outpatient. 2. Bilateral hand cramps:  Patient reports that she has had these intermittently in the past.  Electrolytes WNL. Will check magnesium. Heat/ice as needed. Added lidocaine cream as needed for pain. 3. HTN:  On amlodipine, lisinopril  4. Clinical COPD:  Per pulmonology.   On duo-neb 4 times daily.  Will need PFTs as outpatient. 5. KEVIN:  Did not tolerate nocturnal bipap. Continue nasal cannula oxygen at night (at least 2-3 L to keep O2 sat > 88%). Will need sleep study as outpatient. 6. Depression, anxiety:  Not currently on medication  7. Morbid obesity:  BMI 46.09  8. Bowel Management: Miralax daily, senokot prn, dulcolax prn, milk of magnesia prn.  9. DVT Prophylaxis:  aspirin BID  10. Internal Medicine for medical management      Estimated Length of Stay:  1-2 weeks.     Prognosis  fair    Goals  Home at Independent  Supervision at Discharge: None      Tawanda Churchill MD

## 2021-09-25 NOTE — PROGRESS NOTES
[address] [address]   Acute Rehabilitation OT Evaluation  Date: 21  Patient Name: Matt Arenas       Room: 7490/0647-27  MRN: [id number]  Account: [de-identified]   : 1960  (64 y.o.) Gender: female     Referring Practitioner: Shaggy Michelle MD  Diagnosis: B TKA 21    Treatment Diagnosis: Impaired self-care status   Past Medical History:  has a past medical history of Anxiety, Arthritis, Depression, Hypertension, Knee pain, Murmur, PONV (postoperative nausea and vomiting), and Sleep apnea. Past Surgical History:   has a past surgical history that includes Rotator cuff repair (Left); tumor removal; Ulnar tunnel release (Right); skin biopsy; eye surgery (Left, 2018); and knee surgery (Bilateral, 2021). Restrictions  Restrictions/Precautions: Weight Bearing, Surgical Protocols, Fall Risk  Implants present? : Metal implants (B TKA, metal plate and screws in RUE)  Other position/activity restrictions: FWBAT RLE/LLE  Right Lower Extremity Weight Bearing: Weight Bearing As Tolerated  Left Lower Extremity Weight Bearing: Weight Bearing As Tolerated  Required Braces or Orthoses?: No  Equipment Used: Bed, Wheelchair    Vitals  Temp: 98.1 °F (36.7 °C)  Pulse: 71  Resp: 18  BP: (!) 142/75  Height: 5' 2\" (157.5 cm)  Weight: 252 lb (114.3 kg)  BMI (Calculated): 46.2  Oxygen Therapy  SpO2: 93 %  Pulse Oximeter Device Mode: Intermittent  Pulse Oximeter Device Location: Finger  O2 Device: Nasal cannula  FiO2 : 32 %  O2 Flow Rate (L/min): 2 L/min  Level of Consciousness: Alert (0)    Subjective  Subjective: \"I am exhausted! \"  Comments: Pt reporting extreme fatigue following PT evaluation this morning. OT provided pt with a 30 minute rest and then completed bedside ADL within pt's tolerance.   Pain Level: 5  Pain Location: Knee  Pain Orientation: Right, Left  Orientation  Overall Orientation Status: Within Functional Limits  Vision  Vision: Impaired  Vision Exceptions: Wears glasses at all times  Hearing  Hearing: Within functional limits  Vision - Basic Assessment  Patient Visual Report: No visual complaint reported. Social/Functional History  Lives With: Son  Type of Home: Mobile home (Double wide)  Home Layout: One level  Home Access: Stairs to enter without rails, Ramped entrance (B hand rails on ramp, not by steps)  Entrance Stairs - Number of Steps: 1 step in addition to ramp  Bathroom Shower/Tub: Tub/Shower unit  Bathroom Toilet: Standard (sink close by for support)  Bathroom Accessibility: Walker accessible  Home Equipment: 4 wheeled walker  Receives Help From: Family, Friend(s)  ADL Assistance: Independent  Homemaking Assistance: Independent  Homemaking Responsibilities: Yes  Ambulation Assistance: Independent  Transfer Assistance: Independent (used 4WW PRN)  Active : Yes  Mode of Transportation: [employer]  IADL Comments: pt reports sleeping in flat bed and denies any recent falls  Additional Comments: Pt has 2 sons, 1 son recently broke his leg, but his GF is able to assist pt and provide 24/hr care (GF does not work). Pt's second son is due to come home Sunday or Monday but works full time. per pt has friends and family to provide transport and meals. Pain Assessment  Pain Assessment: 0-10  Pain Level: 5  Pain Type: Surgical pain  Pain Location: Knee  Pain Orientation: Right, Left  Pain Descriptors: Aching  Pain Frequency: Continuous  Response to Pain Intervention: None (pt's pain in bilat knees inc with mobility/activity)    Objective  Vision - Basic Assessment  Patient Visual Report: No visual complaint reported.    Cognition  Overall Cognitive Status: WFL   Perception  Overall Perceptual Status: WFL  Sensation  Overall Sensation Status: WFL     UE Function  LUE Strength  Gross LUE Strength: WFL  L Hand General: 4-/5  LUE Strength Comment: Overall 4-/5     LUE Tone: Normotonic     LUE AROM (degrees)  LUE AROM : WFL     Left Hand AROM (degrees)  Left Hand AROM: WFL     RUE Strength  Gross RUE Strength: WFL  R Hand General: 4-/5  RUE Strength Comment: Overall 4-/5      RUE Tone: Normotonic     RUE AROM (degrees)  RUE AROM : WFL     Right Hand AROM (degrees)  Right Hand AROM: WFL    Fine Motor Skills  Coordination  Movements Are Fluid And Coordinated: No  Coordination and Movement description: Fine motor impairments, Gross motor impairments, Right UE, Left UE (Pt having significant cramps in B hands this AM)     Mobility  Supine to Sit: Moderate assistance       Balance  Sitting Balance: Supervision  Standing Balance: Contact guard assistance (w/BUE support on RW)     Bed mobility  Supine to Sit: Moderate assistance  Scooting: Moderate assistance (Scooting forward to EOB, ModAx1 to scoot hips)  Comment: Pt left up in w/c at end of session. Transfers  Stand Pivot Transfers: Contact guard assistance (w/RW)  Sit to stand: Moderate assistance, 2 Person assistance (from EOB; inc'd pain in knees/hands)  Stand to sit: Contact guard assistance  Transfer Comments: w/RW  Toilet Transfers  Equipment Used: Raised toilet seat with rails  Toilet Transfer: Moderate assistance  Toilet Transfers Comments: Per PT who assisted pt with toilet transfer this morning, pt required CGA/Min A to sit on toilet and Mod A to stand from toilet  Wheelchair Bed Transfers  Wheelchair/Bed - Technique: Stand pivot  Equipment Used: Bed, Wheelchair  Level of Asssistance: Moderate assistance, 2 Person assistance  Wheelchair Transfers Comments: Mod A x2 for sit>stand from EOB, CGA for pivot w/RW and stand>sit  Functional Activity Tolerance  Functional Activity Tolerance:  Tolerates 10 - 20 min exercise with multiple rests  Additional Comments: Frequent rest breaks 2* fatigue, B knee pain, and B hand pain/cramping   Activity Tolerance: Patient limited by fatigue, Patient limited by pain  Activity Tolerance: Pt very fatigued this date with increased pain in B knees as well as pain/cramping in B hands    ADL  ADL  Feeding: Modified independent  (Dentures)  Grooming: Setup (Pt washed face and brushed her hair)  UE Bathing: Setup (Supine in bed)  LE Bathing: Maximum assistance (Supine in bed)  UE Dressing: None (No clothing available)  LE Dressing: Dependent/Total (Socks only; No other clothing available)  Toileting: Stand by assistance (Hygiene in sitting per PT; No clothing to manage)  Additional Comments: ADL completed bedside this AM 2* pt reporting extreme fatigue as well as increased pain in B knees. Pt participated within her tolerance, requiring frequent rest breaks. Pt also limited by B hand cramps which she reports having intermittently prior to surgery, but fairly regularly post-op.        OT scores   Eating  Assistance Needed: Independent  CARE Score: 6  Discharge Goal: Independent  Oral Hygiene  Comment: Pt has dentures but did not want to take them out because she does not have adhesive to put them back in; Declined suggestion to brush them while in her mouth  Reason if not Attempted: Patient refused  CARE Score: 7  Discharge Goal: [address] Needed: Supervision or touching assistance  Comment: SBA for hygiene in sitting per PT; No clothing to manage  CARE Score: 4  Discharge Goal: Independent  Shower/Bathe Self  Assistance Needed: Partial/moderate assistance  Comment: UB: set-up; LB: Max A  CARE Score: 3  Discharge Goal: Independent  Upper Body Dressing  Comment: No appropriate clothing available  Reason if not Attempted: Not attempted due to environmental limitations  CARE Score: 10  Discharge Goal: Independent  Lower Body Dressing  Comment: No appropriate clothing available  Reason if not Attempted: Not attempted due to environmental limitations  CARE Score: 10  Discharge Goal: Independent  Putting On/Taking Off Footwear  Assistance Needed: Dependent  CARE Score: 1  Discharge Goal: Independent  Toilet Transfer  Assistance Needed: Partial/moderate assistance  CARE Score: 3  Discharge Goal: Recommendations: Strengthening, ROM, Balance Training, Functional Mobility Training, Endurance Training, Pain Management, Safety Education & Training, Patient/Caregiver Education & Training, Equipment Evaluation, Education, & procurement, Self-Care / ADL, Home Management Training    Equipment Recommendations  Equipment Needed:  (TBD)     09/25/21 1036 09/25/21 1215   OT Individual Minutes   Time In [id number]   Time Out [id number]   Minutes 51 31   Time Code Minutes    Timed Code Treatment Minutes 35 Minutes 31 Minutes     Electronically signed by Stephy Reich on 9/25/21 at 1:28 PM EDT

## 2021-09-25 NOTE — PLAN OF CARE
Problem: Falls - Risk of:  Goal: Will remain free from falls  Description: Will remain free from falls  9/25/2021 1449 by Tony Santa RN  Outcome: Ongoing  9/25/2021 0309 by Marlene Fabry, RN  Outcome: Ongoing  Goal: Absence of physical injury  Description: Absence of physical injury  9/25/2021 1449 by Tony Santa RN  Outcome: Ongoing  9/25/2021 0309 by Marlene Fabry, RN  Outcome: Ongoing     Problem: Skin Integrity:  Goal: Will show no infection signs and symptoms  Description: Will show no infection signs and symptoms  9/25/2021 1449 by Tony Santa RN  Outcome: Ongoing  9/25/2021 0309 by Marlene Fabry, RN  Outcome: Ongoing  Goal: Absence of new skin breakdown  Description: Absence of new skin breakdown  9/25/2021 1449 by Tony Santa RN  Outcome: Ongoing  9/25/2021 0309 by Marlene Fabry, RN  Outcome: Ongoing     Problem: Pain:  Goal: Pain level will decrease  Description: Pain level will decrease  Outcome: Ongoing  Goal: Control of acute pain  Description: Control of acute pain  Outcome: Ongoing  Goal: Control of chronic pain  Description: Control of chronic pain  Outcome: Ongoing     Problem: Musculor/Skeletal Functional Status  Goal: Highest potential functional level  Outcome: Ongoing  Goal: Absence of falls  Outcome: Ongoing

## 2021-09-25 NOTE — PLAN OF CARE
Problem: Falls - Risk of:  Goal: Will remain free from falls  Description: Will remain free from falls  9/25/2021 0309 by Caryl Neves RN  Outcome: Ongoing     Problem: Falls - Risk of:  Goal: Absence of physical injury  Description: Absence of physical injury  9/25/2021 0309 by Caryl Neves RN  Outcome: Ongoing     Problem: Skin Integrity:  Goal: Will show no infection signs and symptoms  Description: Will show no infection signs and symptoms  9/25/2021 0309 by Caryl Neves RN  Outcome: Ongoing     Problem: Skin Integrity:  Goal: Absence of new skin breakdown  Description: Absence of new skin breakdown  9/25/2021 0309 by Caryl Neves RN  Outcome: Ongoing

## 2021-09-26 LAB — MAGNESIUM: 2.3 MG/DL (ref 1.6–2.6)

## 2021-09-26 PROCEDURE — 6370000000 HC RX 637 (ALT 250 FOR IP): Performed by: STUDENT IN AN ORGANIZED HEALTH CARE EDUCATION/TRAINING PROGRAM

## 2021-09-26 PROCEDURE — 99232 SBSQ HOSP IP/OBS MODERATE 35: CPT | Performed by: STUDENT IN AN ORGANIZED HEALTH CARE EDUCATION/TRAINING PROGRAM

## 2021-09-26 PROCEDURE — 94761 N-INVAS EAR/PLS OXIMETRY MLT: CPT

## 2021-09-26 PROCEDURE — 6370000000 HC RX 637 (ALT 250 FOR IP): Performed by: ORTHOPAEDIC SURGERY

## 2021-09-26 PROCEDURE — 1200000000 HC SEMI PRIVATE

## 2021-09-26 PROCEDURE — 36415 COLL VENOUS BLD VENIPUNCTURE: CPT

## 2021-09-26 PROCEDURE — 83735 ASSAY OF MAGNESIUM: CPT

## 2021-09-26 PROCEDURE — 97110 THERAPEUTIC EXERCISES: CPT

## 2021-09-26 PROCEDURE — 97116 GAIT TRAINING THERAPY: CPT

## 2021-09-26 PROCEDURE — 97535 SELF CARE MNGMENT TRAINING: CPT

## 2021-09-26 PROCEDURE — 94640 AIRWAY INHALATION TREATMENT: CPT

## 2021-09-26 PROCEDURE — 2700000000 HC OXYGEN THERAPY PER DAY

## 2021-09-26 PROCEDURE — 99231 SBSQ HOSP IP/OBS SF/LOW 25: CPT | Performed by: INTERNAL MEDICINE

## 2021-09-26 PROCEDURE — 1180000000 HC REHAB R&B

## 2021-09-26 RX ADMIN — ASPIRIN 81 MG: 81 TABLET, COATED ORAL at 20:45

## 2021-09-26 RX ADMIN — DOCUSATE SODIUM 50MG AND SENNOSIDES 8.6MG 1 TABLET: 8.6; 5 TABLET, FILM COATED ORAL at 08:28

## 2021-09-26 RX ADMIN — OXYCODONE HYDROCHLORIDE 10 MG: 10 TABLET ORAL at 20:45

## 2021-09-26 RX ADMIN — BISACODYL 10 MG: 10 SUPPOSITORY RECTAL at 10:06

## 2021-09-26 RX ADMIN — ASPIRIN 81 MG: 81 TABLET, COATED ORAL at 08:28

## 2021-09-26 RX ADMIN — IPRATROPIUM BROMIDE AND ALBUTEROL SULFATE 1 AMPULE: .5; 3 SOLUTION RESPIRATORY (INHALATION) at 19:39

## 2021-09-26 RX ADMIN — OXYCODONE HYDROCHLORIDE 10 MG: 10 TABLET ORAL at 02:00

## 2021-09-26 RX ADMIN — IPRATROPIUM BROMIDE AND ALBUTEROL SULFATE 1 AMPULE: .5; 3 SOLUTION RESPIRATORY (INHALATION) at 14:24

## 2021-09-26 RX ADMIN — AMLODIPINE BESYLATE 10 MG: 10 TABLET ORAL at 08:28

## 2021-09-26 RX ADMIN — OXYCODONE HYDROCHLORIDE 10 MG: 10 TABLET ORAL at 16:43

## 2021-09-26 RX ADMIN — POLYETHYLENE GLYCOL 3350 17 G: 17 POWDER, FOR SOLUTION ORAL at 08:27

## 2021-09-26 RX ADMIN — LISINOPRIL 20 MG: 20 TABLET ORAL at 08:28

## 2021-09-26 RX ADMIN — DOCUSATE SODIUM 50MG AND SENNOSIDES 8.6MG 1 TABLET: 8.6; 5 TABLET, FILM COATED ORAL at 20:45

## 2021-09-26 RX ADMIN — OXYCODONE HYDROCHLORIDE 10 MG: 10 TABLET ORAL at 08:27

## 2021-09-26 RX ADMIN — IPRATROPIUM BROMIDE AND ALBUTEROL SULFATE 1 AMPULE: .5; 3 SOLUTION RESPIRATORY (INHALATION) at 08:29

## 2021-09-26 RX ADMIN — IPRATROPIUM BROMIDE AND ALBUTEROL SULFATE 1 AMPULE: .5; 3 SOLUTION RESPIRATORY (INHALATION) at 11:32

## 2021-09-26 ASSESSMENT — PAIN DESCRIPTION - LOCATION
LOCATION: KNEE
LOCATION: KNEE

## 2021-09-26 ASSESSMENT — PAIN SCALES - GENERAL
PAINLEVEL_OUTOF10: 5
PAINLEVEL_OUTOF10: 7
PAINLEVEL_OUTOF10: 5
PAINLEVEL_OUTOF10: 5
PAINLEVEL_OUTOF10: 6
PAINLEVEL_OUTOF10: 4
PAINLEVEL_OUTOF10: 8
PAINLEVEL_OUTOF10: 6

## 2021-09-26 ASSESSMENT — PAIN DESCRIPTION - ORIENTATION
ORIENTATION: RIGHT;LEFT
ORIENTATION: RIGHT;LEFT

## 2021-09-26 ASSESSMENT — PAIN DESCRIPTION - PAIN TYPE
TYPE: ACUTE PAIN;SURGICAL PAIN
TYPE: ACUTE PAIN;SURGICAL PAIN

## 2021-09-26 NOTE — DISCHARGE SUMMARY
Discharge Summary     Patient ID:  Lizzy Flores  [id number]  89 y.o.  1960    Admit date: 9/21/2021    Discharge date and time: 9/24/2021  3:46 PM     Admitting Physician: Kimber Donaldson MD     Admission Diagnoses: Primary osteoarthritis of knees, bilateral [M17.0]    Discharge Diagnoses: same    Admission Condition: fair    Discharged Condition: fair    Indication for Admission: s/p bilateral TKAs, respiratory compromise post operative    Hospital Course: post op pulmonary compromise    Consults: pulmonary/intensive care    Treatments: surgery and PT    Disposition: SNF    Patient Instructions:   Discharge Medication List as of 9/24/2021  3:46 PM      START taking these medications    Details   oxyCODONE-acetaminophen (PERCOCET) 5-325 MG per tablet Take 1-2 tablets by mouth every 4 hours as needed for Pain for up to 7 days. , Disp-42 tablet, R-0Print      aspirin EC 81 MG EC tablet Take 1 tablet by mouth 2 times daily, Disp-90 tablet, R-1Normal         CONTINUE these medications which have NOT CHANGED    Details   amLODIPine (NORVASC) 10 MG tablet Take 10 mg by mouth dailyHistorical Med      lisinopril (PRINIVIL;ZESTRIL) 20 MG tablet Take 20 mg by mouth daily Historical Med         STOP taking these medications       acetaminophen (TYLENOL 8 HOUR ARTHRITIS PAIN) 650 MG extended release tablet Comments:   Reason for Stopping:             Activity: activity as tolerated  Diet: regular diet  Wound Care: keep wound clean and dry    Follow-up with Dr Mary Kay Pires in 2 weeks.     Electronically signed by Kimber Donaldson MD on 9/26/2021 at 9:37 AM

## 2021-09-26 NOTE — PROGRESS NOTES
Physical Therapy        Physical Therapy Cancel Note      DATE: 2021    NAME: Wendie Dove  MRN: [id number]   : 1960    Plan Of Care:  Due to impaired functional endurance and/or medical issues, the patient is to be seen for a combined total of at least a  900 minutes over 7 days of Physical, Occupational and/or Speech Therapy.

## 2021-09-26 NOTE — PLAN OF CARE
Individualized Plan of Care  [facility]    Rehabilitation physician: Dr. Nadine Monroy Date: 9/24/2021     Rehabilitation Diagnosis: Aftercare following bilateral knee joint replacement surgery [Z47.1, Z96.653]     Rehabilitation impairments: self care, mobility and pain management    Factors facilitating achievement of predicted outcomes: Family support, Cooperative and Pleasant  Barriers to the achievement of predicted outcomes: Pain, Decreased endurance, Medical complications, Skin Care, Wound Care and Medication managment    Patient Goals: Improve independence with mobility, Increase overall strength and endurance, Increase balance, Increase endurance, Increase independence with activities of daily living, Integrate appropriate pain management plan, Assure adequate nutritional option for discharge, Continence of bowel and bladder and Provide appropriate patient and family education      NURSING:  Nursing goals for Nohemi Bullock while on the rehabilitation unit will include:  Adequate number of bowel movements, Urinate with no urinary retention >300ml in bladder, Maintain O2 SATs at an acceptable level during stay, Effective pain management while on the rehabilitation unit, Establish adequate pain control plan for discharge, Absence of skin breakdown while on the rehabilitation unit, Improved skin integrity via assessments including wound measurements, Avoidance of any hospital acquired infections, No signs/symptoms of infection at the wound site, Freedom from injury during hospitalization and Complete education with patient/family with understanding demonstrated regarding disease process and resultant impairment     In order to achieve these goals, nursing interventions may include bowel/bladder training, education for medical assistive devices, medication education, O2 saturation management, energy conservation, stress management techniques, fall prevention, alarms protocol, seating and positioning, skin/wound care, pressure relief instruction, dressing changes, infection protection, DVT prophylaxis, assistance with safe transfers , and/or assistance with bathroom activities and hygiene.        PHYSICAL THERAPY:  Goals:        Short term goals  Time Frame for Short term goals: 1 week  Short term goal 1: pt to demonstrate Bilateral Knee AROM of 90 degrees knee flexion to demo improved joint mechanics for safe functional mobility   Short term goal 2: pt to improve overall BLE strength by 1/2 manual muscle grade to improve strength and safety during functional transfers and mobility  Short term goal 3: pt to improve dynamic standing balance with RW from fair to fair+ to improve safety with functional mobility   Short term goal 4: pt to ambulate 50' & 2 turns with RW and CGA to improve mobility for household distances  Short term goal 5: pt to improve Bilat knee extension AROM to 0 degrees to improve joint mechanics for safe amb and transfers  Short term goal 6: pt to perform all bed mobility on flat bed with or w/o rails and MinAx1 to decrease burden of care  Long term goals  Time Frame for Long term goals : Until d/c  Long term goal 1: pt to demonstrate Bilateral Knee AROM of 105 degrees knee flexion to demo improved joint mechanics for safe functional mobility   Long term goal 2: pt to improve overall BLE strength to 4/5 to improve strength and safety during functional transfers and mobility  Long term goal 3: pt to ambulate 100' with RW Mod I to improve safety and tolerance to amb household distances  Long term goal 4: pt to demo negotiation of 10' ramp with RW and 1 step with SBA to allow for safe home access  Long term goal 5: pt to perform all bed mobility on flat bed with or w/o rails Mod I to decrease burden of care  Long term goal 6: pt to independently perform HEP strengthening and ROM with good technique to demo indepence with self-care routine  Long term goal 7: pt to amb 76' with RW in 2 minutes to demo improved gait speed to improve safety with household ambulation    Plan of Care:  Due to impaired functional endurance and/or medical issues, the patient is to be seen for a combined total of at least a  900 minutes over 7 days of Physical, Occupational and/or Speech Therapy. Anticipated interventions may include therapeutic exercises, gait training, neuromuscular re-ed, transfer training, community reintegration, bed mobility, w/c mobility and training. OCCUPATIONAL THERAPY:  Goals:             Short term goals  Time Frame for Short term goals: One Week  Short term goal 1: Pt will complete UB bathing/dressing at Mod I level. Short term goal 2: Pt will complete LB bathing/dressing with Mod A and Good safety using AE as needed. Short term goal 3: Pt will complete toilet transfer and toileting tasks with Min A and Good safety using least restrictive device. Short term goal 4: Pt will tolerate standing for 5+ minutes with 1-2 UE support, SBA, and no LOB while completing a functional task. Short term goal 5: Pt will actively participate in 30 minutes of therapeutic exercise/activity to promote increased independence and safety with self-care and mobility. Long term goals  Time Frame for Long term goals : By Discharge  Long term goal 1: Pt will complete BADL's with Mod I and Good safety using AE as needed. Long term goal 2: Pt will complete functional transfers with Mod I and Good safety using least restrictive device. Long term goal 3: Pt will tolerate standing for 10 minutes with 1-2 UE support and no LOB while completing a functional task. Long term goal 4: Pt will complete light housekeeping/simple meal prep with SBA and Good safety using least restrictive device. Long term goal 5: Pt will V/D at least 3 non-pharmacological pain mgt techniques and 3 EC/WS strategies that she can utilize during home routines.     Plan of Care:  Due to impaired functional endurance and/or medical issues, the patient is to be seen for a combined total of at least a  900 minutes over 7 days of Physical, Occupational and/or Speech Therapy. Anticipated interventions may include ADL and IADL retraining, strengthening, safety education and training, patient/caregiver education and training, equipment evaluation/ training/procurement, neuromuscular reeducation, wheelchair mobility training. SPEECH THERAPY:         CASE MANAGEMENT:  Goals:   Assist patient/family with discharge planning, patient/family counseling,  and coordination with insurance during the inpatient rehabilitation stay. Other members of the multidisciplinary rehabilitation team that will be involved in the patient's plan of care include dietary, respiratory therapy. Medical issues being managed closely and that require 24 hour availability of a physician:  Bowel/Bladder function, Weight bearing precautions, Wound care, Pain management, Infection protection, DVT prophylaxis, Fall precautions, Fluid/Electrolyte balance, Nutritional status and Respiratory needs                                           Physician anticipated functional outcomes: Improved independence with functional measures   Estimated length of stay for this admission:  1-2 weeks  Medical Prognosis: Fair  Anticipated disposition: Home. The potential to achieve the above medical and rehabilitative goals is fair. This plan of care has been developed with the assistance and input of the multidisciplinary rehabilitation team.  The plan was reviewed with the patient on 9/26/2021. The patient has had the opportunity to provide input to the therapy team.    I have reviewed this Individualized Plan of Care and agree with its contents. Above documentation has been expanded, modified, adjusted to reflect the findings of my evaluations and goals for the patient.     Physician:  Electronically signed by Aleksandar Rosales MD on 9/26/21 at 10:49 AM EDT

## 2021-09-26 NOTE — PLAN OF CARE
Problem: Falls - Risk of:  Goal: Will remain free from falls  Description: Will remain free from falls  9/26/2021 0321 by Gavin Lawson RN  Outcome: Ongoing     Problem: Falls - Risk of:  Goal: Absence of physical injury  Description: Absence of physical injury  9/26/2021 0321 by Gavin Lawson RN  Outcome: Ongoing     Problem: Skin Integrity:  Goal: Will show no infection signs and symptoms  Description: Will show no infection signs and symptoms  9/26/2021 0321 by Gavin Lawson RN  Outcome: Ongoing     Problem: Skin Integrity:  Goal: Absence of new skin breakdown  Description: Absence of new skin breakdown  9/26/2021 0321 by Gavin Lawson RN  Outcome: Ongoing     Problem: Pain:  Goal: Pain level will decrease  Description: Pain level will decrease  9/26/2021 0321 by Gavin Lawson RN  Outcome: Ongoing     Problem: Pain:  Goal: Control of acute pain  Description: Control of acute pain  9/26/2021 0321 by Gavin Lawson RN  Outcome: Ongoing     Problem: Pain:  Goal: Control of chronic pain  Description: Control of chronic pain  9/26/2021 0321 by Gavin Lawson RN  Outcome: Ongoing     Problem: Musculor/Skeletal Functional Status  Goal: Highest potential functional level  9/26/2021 0321 by Gavin Lawson RN  Outcome: Ongoing     Problem: Musculor/Skeletal Functional Status  Goal: Absence of falls  9/26/2021 0321 by Gavin Lawson RN  Outcome: Ongoing

## 2021-09-26 NOTE — CONSULTS
[facility] Internal Medicine    CONSULTATION    / FOLLOW UP VISIT       Date:   9/26/2021  Patient name:  Ambar Burgos  Date of admission:  9/24/2021  3:48 PM  MRN:   [id number]  Account:  [de-identified]  YOB: 1960  PCP:    Marley Aparicio  Room:   2608/2608-01  Code Status:    Full Code    Physician Requesting Consult: Perlita Lares MD    History of Present Illness:      C/C ;  Medical comorbidity management     REASON FOR CONSULT;  Medical comorbidity and medication management ;                                                 *Principal Problem:    S/P total knee arthroplasty, bilateral  Active Problems: Morbid obesity ([facility] [address]    History of tobacco use    COPD (chronic obstructive pulmonary disease) ([facility] [address]    Aftercare following bilateral knee joint replacement surgery  Resolved Problems:    * No resolved hospital problems. *           HPI;    S/p bilateral knee arthroplasty   Struct of sleep apnea   Known to have obstructive sleep apnea  Did have acute exacerbation of COPD  Was seen by Dr. Roderick Díaz hypertensive  Vitals:    09/25/21 2006 09/26/21 0826 09/26/21 0829 09/26/21 1132   BP:  139/77     Pulse:  67     Resp:  16 16 16   Temp:  97.5 °F (36.4 °C)     TempSrc:       SpO2: 95% 94% 94% 93%   Weight:       Height:          The patient is a 64y.o.-year-old with ADL and mobility deficits secondary to knee osteoarthritis s/p bilateral total knee arthroplasty. She will require close medical monitoring for the comorbidities listed below. She will benefit from intensive interdisciplinary therapies and rehab nursing care and is appropriate for inpatient rehabilitation. The post admission physician evaluation (STEPHANIE) is consistent with the pre-admission assessment. See above findings to reflect the elements required in the STEPHANIE.   Patient's admitting condition is consistent with the findings of the preadmission assessment by the rehabilitation admissions coordinator. Diagnoses/plan:     1. Bilateral knee osteoarthritis s/p bilateral total knee arthroplasty:  Procedure done 9/21/21. WBAT to the bilateral lower limbs. PT/OT for gait, mobility, strengthening, endurance, ADLs, and self care. Pain control with tylenol and oxycodone as needed. Follow up with Dr. Joseline Bassett as outpatient. 2. Bilateral hand cramps:  Patient reports that she has had these intermittently in the past.  Electrolytes WNL. Will check magnesium. Heat/ice as needed. Added lidocaine cream as needed for pain. 3. HTN:  On amlodipine, lisinopril  4. Clinical COPD:  Per pulmonology. On duo-neb 4 times daily. Will need PFTs as outpatient. 5. KEVIN:  Did not tolerate nocturnal bipap. Continue nasal cannula oxygen at night (at least 2-3 L to keep O2 sat > 88%). Will need sleep study as outpatient. 6. Depression, anxiety:  Not currently on medication  7. Morbid obesity:  BMI 46.09  8. Bowel Management: Miralax daily, senokot prn, dulcolax prn, milk of magnesia prn.  9. DVT Prophylaxis:  aspirin BID  10. Internal Medicine for medical management          Past and Surgical hx as in H and P  Social History:     Tobacco:    reports that she has been smoking. She has been smoking about 1.00 pack per day. She has never used smokeless tobacco.  Alcohol:      reports previous alcohol use. Drug Use:  reports previous drug use. Review of Systems:     POSITIVE AND NEGATIVES AS DESCRIBED IN HISTORY OF PRESENT ILLNESS ;  IN ADDITION ;  Review of Systems          All other systems negative                Physical Exam:     Physical Exam   Vitals:    09/25/21 2006 09/26/21 0826 09/26/21 0829 09/26/21 1132   BP:  139/77     Pulse:  67     Resp:  16 16 16   Temp:  97.5 °F (36.4 °C)     TempSrc:       SpO2: 95% 94% 94% 93%   Weight:       Height:                       Body mass index is 46.09 kg/m².      General Appearance:   -, CO-OPERATIVE , 9/26/2021    No new complaints/symptoms . Symptoms or ailment  course ;                                   are improving over time. · Continue present regimen 1. Thanks for consulting us . Will monitor vitals and clinical course , and  Optimize therapy  as needed . Chester Lemus MD    Copy sent to Dr. Jemima Braswell that this chart was generated using voice recognition Dragon dictation software. Although every effort was made to ensure the accuracy of this automated transcription, some errors in transcription may have occurred.

## 2021-09-26 NOTE — PLAN OF CARE
Nutrition Problem #1: Inadequate oral intake  Intervention: Food and/or Nutrient Delivery: Continue Current Diet  Nutritional Goals: PO intake % of most meals.

## 2021-09-26 NOTE — PROGRESS NOTES
Hospitalist Physical Medicine & Rehabilitation  Progress Note      Subjective:      Vani Pratt is a 64 y.o. female with bilateral knee osteoarthritis s/p bilateral total knee arthroplasty. She reports feeling \"exhausted\" again today after therapies. She notes ongoing cramping in the bilateral hands. Nurse states that patient had an episode of emesis this morning. She did have a bowel movement later in the morning. She denies any other acute concerns. ROS:  Denies fevers, chills, sweats. No chest pain, palpitations, lightheadedness. +shortness of breath; Denies coughing, wheezing  +nausea  No new areas of joint pain. Denies new areas of numbness or weakness. Denies new anxiety or depression issues. No new skin problems. Rehabilitation:   Progressing in therapies. PT:  Restrictions/Precautions: Weight Bearing, Surgical Protocols, Fall Risk  Implants present? : Metal implants (B TKA, Metal plate and screws in RUE)  Other position/activity restrictions: FWBAT RLE/LLE  Right Lower Extremity Weight Bearing: Weight Bearing As Tolerated  Left Lower Extremity Weight Bearing: Weight Bearing As Tolerated   Transfers  Sit to Stand: Minimal Assistance  Stand to sit: Contact guard assistance  Bed to Chair: Contact guard assistance  Stand Pivot Transfers: Contact guard assistance (RW)  Comment: slow guarded movements, post transfer to<> from [facility] SpO2 87 % room air , nursing notified requesting extended O2 tubing    WB Status: FWBAT BLEs  Ambulation 1  Surface: level tile  Device: Rolling Walker  Other Apparatus: O2  Assistance: Contact guard assistance, 2 Person assistance (CGAx1 + W/C follow for 45' distance)  Quality of Gait: Very slow & antalgic gait, Wide LUX, pt moves slowly/cautiously with dec bilateral step height and length.  Heavy BUEs WB on RW  Gait Deviations: Slow Janet, Increased LUX, Decreased step length, Decreased step height  Distance: 15'x3,5', 45'  Comments: pt ambulates multiple short distances within room and hallway with RW and CGA. Dyspnea noted during gait with SpO2 remaining at or slightly above 90% (90-93%). Further amb deferred this date d/t dyspnea and inc fatigue. VCs to increase heel strike, Ed on inc WB through BLEs to improve energy efficency of gait and decrease pain in BUEs. Transfers  Sit to Stand: Minimal Assistance  Stand to sit: Contact guard assistance  Bed to Chair: Contact guard assistance  Stand Pivot Transfers: Contact guard assistance (RW)  Comment: slow guarded movements, post transfer to<> from [facility] SpO2 87 % room air , nursing notified requesting extended O2 tubing    Ambulation  Ambulation?: No  WB Status: FWBAT BLEs  Ambulation 1  Surface: level tile  Device: Rolling Walker  Other Apparatus: O2  Assistance: Contact guard assistance, 2 Person assistance (CGAx1 + W/C follow for 45' distance)  Quality of Gait: Very slow & antalgic gait, Wide LUX, pt moves slowly/cautiously with dec bilateral step height and length. Heavy BUEs WB on RW  Gait Deviations: Slow Janet, Increased LUX, Decreased step length, Decreased step height  Distance: 15'x3,5', 45'  Comments: pt ambulates multiple short distances within room and hallway with RW and CGA. Dyspnea noted during gait with SpO2 remaining at or slightly above 90% (90-93%). Further amb deferred this date d/t dyspnea and inc fatigue. VCs to increase heel strike, Ed on inc WB through BLEs to improve energy efficency of gait and decrease pain in BUEs. Surface: level tile  Ambulation 1  Surface: level tile  Device: Rolling Walker  Other Apparatus: O2  Assistance: Contact guard assistance, 2 Person assistance (CGAx1 + W/C follow for 45' distance)  Quality of Gait: Very slow & antalgic gait, Wide LUX, pt moves slowly/cautiously with dec bilateral step height and length.  Heavy BUEs WB on RW  Gait Deviations: Slow Janet, Increased LUX, Decreased step length, Decreased step height  Distance: 15'x3,5', 45'  Comments: pt ambulates multiple short distances within room and hallway with RW and CGA. Dyspnea noted during gait with SpO2 remaining at or slightly above 90% (90-93%). Further amb deferred this date d/t dyspnea and inc fatigue. VCs to increase heel strike, Ed on inc WB through BLEs to improve energy efficency of gait and decrease pain in BUEs. OT:  ADL  Feeding: Modified independent  (Dentures; Poor appetite 2* nausea)  Grooming: Setup (Washed face/brushed hair w/c level at sink)  UE Bathing: Setup (w/c level at sink)  LE Bathing: Maximum assistance  UE Dressing: None (Pt declined getting dressed this date)  LE Dressing: Dependent/Total (TA for socks; Declined underwear/pants this date)  Toileting: Dependent/Total (TA for hygiene this date; No clothing)  Additional Comments: Pt agreeable to attempting ADL at sink this date. Pt required increased time and encouragement for all tasks. Rest breaks needed 2* pain, nausea, and fatigue. Pt completed grooming/UB ADL's at above levels. Pt did not want to put clothing on today but did don clean hospital gowns and slipper socks. Pt transferred to toilet using RW with CGA/Min A and attempted to have BM. OT offered footstool 2* pt's feet do not reach floor but pt preferred to rest them on the inside wheels of her RW. Pt became nauseous while sitting on the toilet and had an episode of syncope when she stood up. OT provided A for washing analia-area and buttocks. Pt transferred back to / and OT assisted with washing B lower legs/feet and donning TEDs/socks. Pt with increasing nausea at end of session, RN aware. Balance  Sitting Balance: Supervision  Standing Balance: Minimal assistance (CGA/Min A with RW )            Bed mobility  Supine to Sit: Moderate assistance  Scooting:  Moderate assistance  Comment: Pt left sitting up in w/c at end of session  Transfers  Stand Step Transfers: Contact guard assistance, Minimal assistance  Stand Pivot Transfers: Contact guard assistance, Minimal assistance  Sit to stand: Moderate assistance (from EOB; CGA/Min A from w/c and toilet)  Stand to sit: Contact guard assistance  Transfer Comments: Pt initially CGA for transfers with RW but as fatigue/pain/nausea worsened she required Min A for safety. Toilet Transfers  Toilet - Technique: Stand step  Equipment Used: Raised toilet seat with rails  Toilet Transfer: Contact guard assistance, Minimal assistance  Toilet Transfers Comments: w/RW        Wheelchair Bed Transfers  Wheelchair/Bed - Technique: Stand pivot  Equipment Used: Bed, Wheelchair  Level of Asssistance: Moderate assistance  Wheelchair Transfers Comments: Mod A to stand from EOB, CGA for pivot with RW    SPEECH:      Current Medications:   Current Facility-Administered Medications: lidocaine (LMX) 4 % cream, , Topical, 4x Daily PRN  aspirin EC tablet 81 mg, 81 mg, Oral, BID  magnesium hydroxide (MILK OF MAGNESIA) 400 MG/5ML suspension 30 mL, 30 mL, Oral, Daily PRN  oxyCODONE (ROXICODONE) immediate release tablet 5 mg, 5 mg, Oral, Q4H PRN **OR** oxyCODONE HCl (OXY-IR) immediate release tablet 10 mg, 10 mg, Oral, Q4H PRN  sennosides-docusate sodium (SENOKOT-S) 8.6-50 MG tablet 1 tablet, 1 tablet, Oral, BID  amLODIPine (NORVASC) tablet 10 mg, 10 mg, Oral, Daily  ipratropium-albuterol (DUONEB) nebulizer solution 1 ampule, 1 ampule, Inhalation, 4x daily  lisinopril (PRINIVIL;ZESTRIL) tablet 20 mg, 20 mg, Oral, Daily  acetaminophen (TYLENOL) tablet 650 mg, 650 mg, Oral, Q4H PRN  polyethylene glycol (GLYCOLAX) packet 17 g, 17 g, Oral, Daily  senna (SENOKOT) tablet 17.2 mg, 2 tablet, Oral, Daily PRN  bisacodyl (DULCOLAX) suppository 10 mg, 10 mg, Rectal, Daily PRN      Objective:  /77   Pulse 67   Temp 97.5 °F (36.4 °C)   Resp 16   Ht 5' 2\" (1.575 m)   Wt 252 lb (114.3 kg)   SpO2 93%   BMI 46.09 kg/m²       GEN: Well developed, well nourished, no acute distress  HEENT: NCAT. EOM grossly intact. Hearing grossly intact.   Mucous membranes pink and moist.  RESP:  Normal breath sounds with no wheezing, rales, or rhonchi. Respirations WNL and unlabored. On nasal cannula oxygen. CV: Regular rate and rhythm. No murmurs, rubs, or gallops. ABD: Soft, non-distended, BS+ and equal.  NEURO: Alert. Speech fluent. Sensation to light touch intact. MSK:  Muscle tone and bulk are normal bilaterally. Moving bilateral upper limbs with at least antigravity strength. Strength 4+/5 with bilateral ankle dorsiflexion and plantarflexion. LIMBS: Edema present in bilateral lower limbs. SKIN: Warm and dry with good turgor. PSYCH: Mood WNL. Affect WNL. Appropriately interactive. Diagnostics:     CBC:   Recent Labs     09/25/21  0659   WBC 9.4   RBC 4.11   HGB 12.4   HCT 37.3   MCV 90.8   RDW 13.6        BMP:   Recent Labs     09/25/21  0659      K 4.6      CO2 29   BUN 12   CREATININE 0.53   GLUCOSE 139*     BNP: No results for input(s): BNP in the last 72 hours. PT/INR: No results for input(s): PROTIME, INR in the last 72 hours. APTT: No results for input(s): APTT in the last 72 hours. CARDIAC ENZYMES: No results for input(s): CKMB, CKMBINDEX, TROPONINT in the last 72 hours. Invalid input(s): CKTOTAL;3  FASTING LIPID PANEL:No results found for: CHOL, HDL, TRIG  LIVER PROFILE: No results for input(s): AST, ALT, ALB, BILIDIR, BILITOT, ALKPHOS in the last 72 hours. Magnesium:  2.3      Impression/Plan:   Impaired ADLs, gait, and mobility due to:    1. Bilateral knee osteoarthritis s/p bilateral total knee arthroplasty:  Procedure done 9/21/21. WBAT to the bilateral lower limbs. PT/OT for gait, mobility, strengthening, endurance, ADLs, and self care. Pain control with tylenol and oxycodone as needed. Follow up with Dr. Jeni Trejo as outpatient. 2. Bilateral hand cramps:  Patient reports that she has had these intermittently in the past.  Electrolytes WNL, including magnesium. Heat/ice as needed.   Lidocaine cream as needed for pain. 3. HTN:  On amlodipine, lisinopril  4. Clinical COPD:  Per pulmonology. On duo-neb 4 times daily. Will need PFTs as outpatient. 5. KEVIN:  Did not tolerate nocturnal bipap. Continue nasal cannula oxygen at night (at least 2-3 L to keep O2 sat > 88%). Will need sleep study as outpatient. 6. Depression, anxiety:  Not currently on medication  7. Morbid obesity:  BMI 46.09  8. Bowel Management: Miralax daily, senokot prn, dulcolax prn, milk of magnesia prn.  9. DVT Prophylaxis:  aspirin BID  10.  Internal Medicine for medical management      Electronically signed by Fiona Naidu MD on 9/26/2021 at 5:00 PM

## 2021-09-26 NOTE — PROGRESS NOTES
Physical Therapy  Facility/Department: [facility] ACUTE REHAB  Daily Treatment Note  NAME: Delon Perrin  : 1960  MRN: [id number]    Date of Service: 2021    Discharge Recommendations:  Patient would benefit from continued therapy after discharge   PT Equipment Recommendations  Equipment Needed: Yes  Walker: Rolling  Other: Need for RW TBD, pt has a 4WW but may require a more stable AD upon d/c    Assessment   Body structures, Functions, Activity limitations: Decreased functional mobility ; Decreased ROM; Decreased strength;Decreased endurance;Decreased balance; Increased pain  Treatment Diagnosis: impaired functional mobility secondary to Bilateral TKA procedure  Prognosis: Fair  REQUIRES PT FOLLOW UP: Yes  Activity Tolerance  Activity Tolerance: Patient limited by pain; Patient limited by endurance     Patient Diagnosis(es): There were no encounter diagnoses. has a past medical history of Anxiety, Arthritis, Depression, Hypertension, Knee pain, Murmur, PONV (postoperative nausea and vomiting), and Sleep apnea. has a past surgical history that includes Rotator cuff repair (Left); tumor removal; Ulnar tunnel release (Right); skin biopsy; eye surgery (Left, ); and knee surgery (Bilateral, 2021). Restrictions  Restrictions/Precautions  Restrictions/Precautions: Weight Bearing, Surgical Protocols, Fall Risk  Required Braces or Orthoses?: No  Implants present? : Metal implants (B TKA, Metal plate and screws in R UE)  Lower Extremity Weight Bearing Restrictions  Right Lower Extremity Weight Bearing: Weight Bearing As Tolerated  Left Lower Extremity Weight Bearing: Weight Bearing As Tolerated  Position Activity Restriction  Other position/activity restrictions: FWBAT RLE/LLE  Subjective   General  Additional Pertinent Hx: pt is a 64 y.o. woman recently admitted to hospital for Bilateral TKA procedure on 21 by Dr. Lazaro Goode.  Pt with initial diagnosis of Bilateral knee Degenerative Joint Disease and H/O osteoarthritis, sleep apnea (no machine), and HTN. Admitted to [facility] on 9/24/21  Family / Caregiver Present: No  Referring Practitioner: Dr. Dickerson Found  Subjective  Subjective: Attempted to see patient x 2 this a.m, pt was still working with OT,and later following 10 mins into session pt had to go tto toilel. Pt c/o constipation and was given a suppository this morning. General Comment  Comments: A.M treatment was deferred, poke to therapist/ OT Meenakshi Quiñonez pt therapy was changed to 900 mins week. Pain Screening  Patient Currently in Pain: Yes  Pain Assessment  Pain Assessment: 0-10  Pain Level: 8  Patient's Stated Pain Goal: No pain  Pain Type: Acute pain;Surgical pain  Pain Location: Knee  Pain Orientation: Right;Left  Vital Signs  Patient Currently in Pain: Yes  Oxygen Therapy  SpO2: 94 %  Pulse Oximeter Device Mode: Intermittent  Pulse Oximeter Device Location: Finger  O2 Device: Nasal cannula  O2 Flow Rate (L/min): 2 L/min       Orientation  Orientation  Overall Orientation Status: Within Functional Limits  Cognition      Objective      Transfers  Sit to Stand: Minimal Assistance  Stand to sit: Contact guard assistance  Stand Pivot Transfers: Contact guard assistance (RW)  Comment: slow guarded movements, Pt report increased stiffness. Pt instructed in ex to perform while sitting i chair or bed in between treatment. Ambulation  Ambulation?: Yes  WB Status: FWBAT BLEs  Ambulation 1  Surface: level tile  Device: Rolling Walker  Other Apparatus: O2 (2)  Assistance: Contact guard assistance (With w/c following)  Quality of Gait: Very slow & antalgic gait, Wide LUX, pt moves slowly/cautiously with dec bilateral step height and length. Heavy BUEs WB on RW  Distance: 25 x1', 40'x 1  Comments: pt easily fatigued with SOB, Encouraged to take rest break PRN.   Stairs/Curb  Stairs?: No  Wheelchair Activities  Propulsion: Yes  Propulsion 1  Level: Level Tile  Distance: 50 ft     Balance  Posture: Fair  Sitting - Static: Good  Sitting - Dynamic: Fair  Standing - Static: Fair;+ (with RW)  Standing - Dynamic: Fair (with RW)  Comments: Standing balance assessed with RW, No LOB to report  Other exercises  Other exercises?: Yes  Other exercises 1: Reviewed glut/Quadsets and ankle pumps in a.m  Other exercises 2: A/AROM seated knee ext, flexion , ankle DF x 10  Other exercises 3: Sit<>Stand x 3 (cues for hand plaement)   PROM LLE (degrees)  L Knee Flexion 0-145: 0 - 75  L Knee Extension 0: 0  AROM LLE (degrees)  LLE AROM : Exceptions     Comment: Seated measurement Right knee flex 72* and Left knee flex 75*. Patient instructed to ask nursing staff assist with cryotherapy PRN.               G-Code     OutComes Score                                                     AM-PAC Score             Goals  Short term goals  Time Frame for Short term goals: 1 week  Short term goal 1: pt to demonstrate Bilateral Knee AROM of 90 degrees knee flexion to demo improved joint mechanics for safe functional mobility   Short term goal 2: pt to improve overall BLE strength by 1/2 manual muscle grade to improve strength and safety during functional transfers and mobility  Short term goal 3: pt to improve dynamic standing balance with RW from fair to fair+ to improve safety with functional mobility   Short term goal 4: pt to ambulate 50' & 2 turns with RW and CGA to improve mobility for household distances  Short term goal 5: pt to improve Bilat knee extension AROM to 0 degrees to improve joint mechanics for safe amb and transfers  Short term goal 6: pt to perform all bed mobility on flat bed with or w/o rails and MinAx1 to decrease burden of care  Long term goals  Time Frame for Long term goals : Until d/c  Long term goal 1: pt to demonstrate Bilateral Knee AROM of 105 degrees knee flexion to demo improved joint mechanics for safe functional mobility   Long term goal 2: pt to improve overall BLE strength to 4/5 to improve strength and safety during

## 2021-09-26 NOTE — PROGRESS NOTES
Comprehensive Nutrition Assessment    Type and Reason for Visit:  Consult (Eval & treat)    Nutrition Recommendations/Plan: Continue diet as ordered. If continued poor intake consider starting a  nutritional supplement. Nutrition Assessment:  Pt had a bilateral knee replacement in rehab for therapy. Pt looked like she doesn't feel good, she relates no appetite and feels a little nauseated. Pt states she finally a BM this morning and hopes to start feeling better. Pt said her friend is bringing her denture paste tonight which should help her eat more. Malnutrition Assessment:  Malnutrition Status:  No malnutrition    Context:  Acute Illness     Findings of the 6 clinical characteristics of malnutrition:  Energy Intake:  Mild decrease in energy intake (Comment)  Weight Loss:  No significant weight loss     Body Fat Loss:  No significant body fat loss     Muscle Mass Loss:  No significant muscle mass loss    Fluid Accumulation:  No significant fluid accumulation     Strength:  Not Performed    Estimated Daily Nutrient Needs:  Energy (kcal):  1715 kcals based on 15 kcals/kg using adm wt 114.3 kg; Weight Used for Energy Requirements:  Admission     Protein (g):  100 gm protein based on 2 gm/kg using IBW; Weight Used for Protein Requirements:  Ideal          Nutrition Related Findings:  Edema: Trace BLE's. Hx COPD sitll smoking, KEVIN      Wounds:  Surgical Incision       Current Nutrition Therapies:    ADULT DIET;  Regular    Anthropometric Measures:  · Height: 5' 2\" (157.5 cm)  · Current Body Weight: 252 lb (114.3 kg)   · Admission Body Weight: 252 lb (114.3 kg)    · Ideal Body Weight: 110 lbs; % Ideal Body Weight 229.1 %   · BMI: 46.1  · BMI Categories: Obese Class 3 (BMI 40.0 or greater)       Nutrition Diagnosis:   · Inadequate oral intake related to altered GI function as evidenced by nausea, intake 0-25%      Nutrition Interventions:   Food and/or Nutrient Delivery:  Continue Current Diet  Nutrition Education/Counseling:  No recommendation at this time   Coordination of Nutrition Care:  Continue to monitor while inpatient    Goals:  PO intake % of most meals. Nutrition Monitoring and Evaluation:   Food/Nutrient Intake Outcomes:  Food and Nutrient Intake  Physical Signs/Symptoms Outcomes:  Biochemical Data, GI Status, Nausea or Vomiting, Fluid Status or Edema, Nutrition Focused Physical Findings, Skin, Weight     Discharge Planning: Too soon to determine     Some areas of assessment may be incomplete due to COVID-19 precautions. Cecil Mcdonnell R.D., L.D.   Clinical Dietitian  Office: [phone]

## 2021-09-26 NOTE — PROGRESS NOTES
[address]   ACUTE REHABILITATION OCCUPATIONAL THERAPY  DAILY NOTE    Date: 21  Patient Name: Matt Arenas      Room: 1002/2042-46    MRN: [id number]   : 1960  (64 y.o.)  Gender: female   Referring Practitioner: Shaggy Michelle MD/Dr. Patel Ordaz  Diagnosis: B TKA 21    Restrictions  Restrictions/Precautions: Weight Bearing, Surgical Protocols, Fall Risk  Implants present? : Metal implants (B TKA, Metal plate and screws in RUE)  Other position/activity restrictions: FWBAT RLE/LLE  Right Lower Extremity Weight Bearing: Weight Bearing As Tolerated  Left Lower Extremity Weight Bearing: Weight Bearing As Tolerated  Required Braces or Orthoses?: No  Equipment Used: Bed, Wheelchair    Subjective  Subjective: \"I don't know what it is but I feel miserable\"  Comments: Pt with c/o nausea, SOB, pain in B knees, and general malaise. Pt reports no BM x6 days also contributing. RN aware of pt c/o. SpO2 maintained 91-93% on 2L but pt demo'd increased SOB with exertion. Pt with one episode of syncope when standing from toilet. Patient Currently in Pain: Yes  Pain Level: 6  Pain Location: Knee  Pain Orientation: Right;Left  Restrictions/Precautions: Weight Bearing;Surgical Protocols; Fall Risk  Overall Orientation Status: Within Functional Limits     Pain Assessment  Pain Assessment: 0-10  Pain Level: 6  Pain Type: Acute pain, Surgical pain  Pain Location: Knee  Pain Orientation: Right, Left  Pain Descriptors: Aching  Pain Frequency: Continuous  Response to Pain Intervention: None (pt's pain in bilat knees inc with mobility/activity)    Objective  Cognition  Overall Cognitive Status: WFL  Perception  Overall Perceptual Status: WFL  Balance  Sitting Balance: Supervision  Standing Balance: Minimal assistance (CGA/Min A with RW )  Bed mobility  Supine to Sit: Moderate assistance  Scooting:  Moderate assistance  Comment: Pt left sitting up in w/c at end of session  Transfers  Stand Step Transfers: Contact guard assistance;Minimal assistance  Stand Pivot Transfers: Contact guard assistance;Minimal assistance  Sit to stand: Moderate assistance (from EOB; CGA/Min A from w/c and toilet)  Stand to sit: Contact guard assistance  Transfer Comments: Pt initially CGA for transfers with RW but as fatigue/pain/nausea worsened she required Min A for safety. Toilet Transfers  Toilet - Technique: Stand step  Equipment Used: Raised toilet seat with rails  Toilet Transfer: Contact guard assistance;Minimal assistance  Toilet Transfers Comments: w/RW     Vision - Basic Assessment  Patient Visual Report: No visual complaint reported. Vision  Patient Visual Report: No visual complaint reported. ADL  Feeding: Modified independent  (Dentures; Poor appetite 2* nausea)  Grooming: Setup (Washed face/brushed hair w/c level at sink)  UE Bathing: Setup (w/c level at sink)  LE Bathing: Maximum assistance  UE Dressing: None (Pt declined getting dressed this date)  LE Dressing: Dependent/Total (TA for socks; Declined underwear/pants this date)  Toileting: Dependent/Total (TA for hygiene this date; No clothing)  Additional Comments: Pt agreeable to attempting ADL at sink this date. Pt required increased time and encouragement for all tasks. Rest breaks needed 2* pain, nausea, and fatigue. Pt completed grooming/UB ADL's at above levels. Pt did not want to put clothing on today but did don clean hospital gowns and slipper socks. Pt transferred to toilet using RW with CGA/Min A and attempted to have BM. OT offered footstool 2* pt's feet do not reach floor but pt preferred to rest them on the inside wheels of her RW. Pt became nauseous while sitting on the toilet and had an episode of syncope when she stood up. OT provided A for washing analia-area and buttocks. Pt transferred back to w/c and OT assisted with washing B lower legs/feet and donning TEDs/socks.   Pt with increasing nausea at end of session, RN aware.    Assessment  Performance deficits / Impairments: Decreased functional mobility ; Decreased ADL status; Decreased ROM; Decreased strength;Decreased safe awareness;Decreased endurance;Decreased balance;Decreased high-level IADLs;Decreased coordination  Prognosis: Good  Discharge Recommendations: Home with assist PRN  Activity Tolerance: Patient limited by fatigue;Patient limited by pain;Treatment limited secondary to medical complications (free text)  Activity Tolerance: Pt with c/o pain, nausea, and fatigue this date limiting her participation/activity tolerance in AM.  Pt declined PM OT session. Safety Devices in place: Yes  Type of devices: Patient at risk for falls;Gait belt;Left in chair;Call light within reach;Nurse notified    Patient Education:  Patient Goals   Patient goals : To return to independence   Activity promotion, rehab process, pain management, transfer training/safety, ADL training  Learner:patient  Method: demonstration and explanation       Outcome: needs reinforcement     Plan  Plan  Times per week: 900/7  Plan Of Care:  Due to impaired functional endurance and/or medical issues, the patient is to be seen for a combined total of at least a  900 minutes over 7 days of Physical, Occupational and/or Speech Therapy. Times per day: Twice a day  Current Treatment Recommendations: Strengthening, ROM, Balance Training, Functional Mobility Training, Endurance Training, Pain Management, Safety Education & Training, Patient/Caregiver Education & Training, Equipment Evaluation, Education, & procurement, Self-Care / ADL, Home Management Training  Patient Goals   Patient goals : To return to independence  Short term goals  Time Frame for Short term goals: One Week  Short term goal 1: Pt will complete UB bathing/dressing at Mod I level. Short term goal 2: Pt will complete LB bathing/dressing with Mod A and Good safety using AE as needed.   Short term goal 3: Pt will complete toilet transfer and toileting tasks with Min A and Good safety using least restrictive device. Short term goal 4: Pt will tolerate standing for 5+ minutes with 1-2 UE support, SBA, and no LOB while completing a functional task. Short term goal 5: Pt will actively participate in 30 minutes of therapeutic exercise/activity to promote increased independence and safety with self-care and mobility. Long term goals  Time Frame for Long term goals : By Discharge  Long term goal 1: Pt will complete BADL's with Mod I and Good safety using AE as needed. Long term goal 2: Pt will complete functional transfers with Mod I and Good safety using least restrictive device. Long term goal 3: Pt will tolerate standing for 10 minutes with 1-2 UE support and no LOB while completing a functional task. Long term goal 4: Pt will complete light housekeeping/simple meal prep with SBA and Good safety using least restrictive device. Long term goal 5: Pt will V/D at least 3 non-pharmacological pain mgt techniques and 3 EC/WS strategies that she can utilize during home routines.      09/26/21 0844   OT Individual Minutes   Time In 0844   Time Out 0945   Minutes 61   Minute Variance   Variance 29   Reason Illness   Time Code Minutes    Timed Code Treatment Minutes 61 Minutes     Electronically signed by Willie Poole on 9/26/21 at 3:14 PM EDT

## 2021-09-26 NOTE — PLAN OF CARE
Problem: Falls - Risk of:  Goal: Will remain free from falls  Description: Will remain free from falls  9/26/2021 1512 by Andrea Sparks RN  Outcome: Ongoing  9/26/2021 0321 by Young Pena RN  Outcome: Ongoing  Goal: Absence of physical injury  Description: Absence of physical injury  9/26/2021 1512 by Andrea Sparks RN  Outcome: Ongoing  9/26/2021 0321 by Young Pena RN  Outcome: Ongoing     Problem: Skin Integrity:  Goal: Will show no infection signs and symptoms  Description: Will show no infection signs and symptoms  9/26/2021 1512 by Andrea Sparks RN  Outcome: Ongoing  9/26/2021 0321 by Young Pena RN  Outcome: Ongoing  Goal: Absence of new skin breakdown  Description: Absence of new skin breakdown  9/26/2021 1512 by Andrea Sparks RN  Outcome: Ongoing  9/26/2021 0321 by Young Pena RN  Outcome: Ongoing     Problem: Pain:  Goal: Pain level will decrease  Description: Pain level will decrease  9/26/2021 1512 by Andrea Sparks RN  Outcome: Ongoing  9/26/2021 0321 by Young Pena RN  Outcome: Ongoing  Goal: Control of acute pain  Description: Control of acute pain  9/26/2021 1512 by Andrea Spakrs RN  Outcome: Ongoing  9/26/2021 0321 by Young Pena RN  Outcome: Ongoing  Goal: Control of chronic pain  Description: Control of chronic pain  9/26/2021 1512 by Andrea Sparks RN  Outcome: Ongoing  9/26/2021 0321 by Young Pena RN  Outcome: Ongoing     Problem: Musculor/Skeletal Functional Status  Goal: Highest potential functional level  9/26/2021 1512 by Andrea Sparks RN  Outcome: Ongoing  9/26/2021 0321 by Young Pena RN  Outcome: Ongoing  Goal: Absence of falls  9/26/2021 1512 by Andrea Sparks RN  Outcome: Ongoing  9/26/2021 0321 by Young Pena RN  Outcome: Ongoing     Problem: Nutrition  Goal: Optimal nutrition therapy  Outcome: Ongoing

## 2021-09-27 PROCEDURE — 97530 THERAPEUTIC ACTIVITIES: CPT

## 2021-09-27 PROCEDURE — 6370000000 HC RX 637 (ALT 250 FOR IP): Performed by: ORTHOPAEDIC SURGERY

## 2021-09-27 PROCEDURE — 6370000000 HC RX 637 (ALT 250 FOR IP): Performed by: STUDENT IN AN ORGANIZED HEALTH CARE EDUCATION/TRAINING PROGRAM

## 2021-09-27 PROCEDURE — 97535 SELF CARE MNGMENT TRAINING: CPT

## 2021-09-27 PROCEDURE — 99231 SBSQ HOSP IP/OBS SF/LOW 25: CPT | Performed by: INTERNAL MEDICINE

## 2021-09-27 PROCEDURE — 94640 AIRWAY INHALATION TREATMENT: CPT

## 2021-09-27 PROCEDURE — 94664 DEMO&/EVAL PT USE INHALER: CPT

## 2021-09-27 PROCEDURE — 1180000000 HC REHAB R&B

## 2021-09-27 PROCEDURE — 6370000000 HC RX 637 (ALT 250 FOR IP): Performed by: INTERNAL MEDICINE

## 2021-09-27 PROCEDURE — 97110 THERAPEUTIC EXERCISES: CPT

## 2021-09-27 PROCEDURE — 94150 VITAL CAPACITY TEST: CPT

## 2021-09-27 PROCEDURE — 94761 N-INVAS EAR/PLS OXIMETRY MLT: CPT

## 2021-09-27 PROCEDURE — 1200000000 HC SEMI PRIVATE

## 2021-09-27 PROCEDURE — 99232 SBSQ HOSP IP/OBS MODERATE 35: CPT | Performed by: PHYSICAL MEDICINE & REHABILITATION

## 2021-09-27 RX ORDER — CALCIUM CARBONATE 200(500)MG
500 TABLET,CHEWABLE ORAL 3 TIMES DAILY PRN
Status: DISCONTINUED | OUTPATIENT
Start: 2021-09-27 | End: 2021-10-05 | Stop reason: HOSPADM

## 2021-09-27 RX ADMIN — ANTACID TABLETS 500 MG: 500 TABLET, CHEWABLE ORAL at 19:48

## 2021-09-27 RX ADMIN — ASPIRIN 81 MG: 81 TABLET, COATED ORAL at 08:01

## 2021-09-27 RX ADMIN — DOCUSATE SODIUM 50MG AND SENNOSIDES 8.6MG 1 TABLET: 8.6; 5 TABLET, FILM COATED ORAL at 20:19

## 2021-09-27 RX ADMIN — OXYCODONE HYDROCHLORIDE 10 MG: 10 TABLET ORAL at 06:24

## 2021-09-27 RX ADMIN — OXYCODONE HYDROCHLORIDE 10 MG: 10 TABLET ORAL at 20:19

## 2021-09-27 RX ADMIN — POLYETHYLENE GLYCOL 3350 17 G: 17 POWDER, FOR SOLUTION ORAL at 08:01

## 2021-09-27 RX ADMIN — IPRATROPIUM BROMIDE AND ALBUTEROL SULFATE 1 AMPULE: .5; 3 SOLUTION RESPIRATORY (INHALATION) at 19:44

## 2021-09-27 RX ADMIN — AMLODIPINE BESYLATE 10 MG: 10 TABLET ORAL at 08:00

## 2021-09-27 RX ADMIN — ASPIRIN 81 MG: 81 TABLET, COATED ORAL at 20:19

## 2021-09-27 RX ADMIN — DOCUSATE SODIUM 50MG AND SENNOSIDES 8.6MG 1 TABLET: 8.6; 5 TABLET, FILM COATED ORAL at 08:01

## 2021-09-27 RX ADMIN — OXYCODONE HYDROCHLORIDE 10 MG: 10 TABLET ORAL at 15:08

## 2021-09-27 RX ADMIN — LISINOPRIL 20 MG: 20 TABLET ORAL at 08:01

## 2021-09-27 ASSESSMENT — PAIN SCALES - GENERAL
PAINLEVEL_OUTOF10: 7
PAINLEVEL_OUTOF10: 7
PAINLEVEL_OUTOF10: 5
PAINLEVEL_OUTOF10: 5
PAINLEVEL_OUTOF10: 7
PAINLEVEL_OUTOF10: 6

## 2021-09-27 ASSESSMENT — PAIN DESCRIPTION - ORIENTATION
ORIENTATION: RIGHT;LEFT

## 2021-09-27 ASSESSMENT — PAIN DESCRIPTION - LOCATION
LOCATION: KNEE
LOCATION: NECK
LOCATION: KNEE
LOCATION: KNEE

## 2021-09-27 ASSESSMENT — PAIN DESCRIPTION - PAIN TYPE
TYPE: ACUTE PAIN
TYPE: ACUTE PAIN;SURGICAL PAIN
TYPE: ACUTE PAIN
TYPE: ACUTE PAIN;SURGICAL PAIN;OTHER (COMMENT)
TYPE: ACUTE PAIN

## 2021-09-27 NOTE — PROGRESS NOTES
Nutrition Note    Spoke with nurse and reviewed chart. Nausea appears to be improving today with a small breakfast consumed and % of lunch. Regular diet to continue. Ela Bucio R.D., L.D.   Phone: [phone]

## 2021-09-27 NOTE — PATIENT CARE CONFERENCE
flexion to demo improved joint mechanics for safe functional mobility   Short term goal 2: pt to improve overall BLE strength by 1/2 manual muscle grade to improve strength and safety during functional transfers and mobility  Short term goal 3: pt to improve dynamic standing balance with RW from fair to fair+ to improve safety with functional mobility   Short term goal 4: pt to ambulate 48' & 2 turns with RW and CGA to improve mobility for household distances  Short term goal 5: pt to improve Bilat knee extension AROM to 0 degrees to improve joint mechanics for safe amb and transfers  Short term goal 6: pt to perform all bed mobility on flat bed with or w/o rails and MinAx1 to decrease burden of care    OCCUPATIONAL THERAPY  SELF CARE      Eating            Modified independent  (Dentures, slight improvement in appetite per pt)   Oral Hygiene            Setup (w/c level at sink)   Shower/Bathe Self             UE Bathing: Setup (w/c level at sink)  LE Bathing: Moderate assistance (A for buttocks and lower legs/feet)   Upper Body Dressing            Stand by assistance (Donned sports bra and [place] dress)   Lower Body Dressing            Putting On/Taking Off Footwear             Stand by assistance (Don/doffed socks w/SBA and AE;  No pants this date)   Toilet Transfer             Toilet - Technique: Stand step  Equipment Used: Raised toilet seat with rails  Toilet Transfer: Contact guard assistance;Minimal assistance  Toilet Transfers Comments: CGA for all aspects of transfer except sit>stand, requiring Min A in AM (improved to CGA in PM)   Toileting Hygiene            Minimal assistance (Managed pull-up w/CGA, analia-hygiene seated, TA for buttocks)    Bed mobility  Supine to Sit: Moderate assistance  Scooting: Minimal assistance (@ EOB)  Comment: Pt left sitting up in w/c at end of session in AM and reclining chair in PM.        Balance  Sitting Balance: Supervision  Standing Balance: Contact guard assistance (w/RW; briefly stood unsupported during toileting w/no LOB)  Standing Balance  Time: Several 1-2 minutes trials  Activity: LB bathing/dressing and toileting  Comment: CGA with 1-2 UE support, briefly able to stand unsupported during toileting tasks    Equipment Recommendations  Equipment Needed:  (TBD)       Short term goals  Time Frame for Short term goals: One Week  Short term goal 1: Pt will complete UB bathing/dressing at Mod I level. Short term goal 2: Pt will complete LB bathing/dressing with Mod A and Good safety using AE as needed. Short term goal 3: Pt will complete toilet transfer and toileting tasks with Min A and Good safety using least restrictive device. Short term goal 4: Pt will tolerate standing for 5+ minutes with 1-2 UE support, SBA, and no LOB while completing a functional task. Short term goal 5: Pt will actively participate in 30 minutes of therapeutic exercise/activity to promote increased independence and safety with self-care and mobility. SPEECH THERAPY    NUTRITION  Weight: 252 lb (114.3 kg) / Body mass index is 46.09 kg/m². Diet Rx: Regular. PO intake has been variable. Some issues with nausea and vomiting since admission to rehab. Appeared to be doing better 9/27 with less nausea and improving intake. Please see nutrition note for details.     SOCIAL WORK ASSESSMENT  Assessment:   Pre-Admission Status:  Lives With: Son  Type of Home: Mobile home (Double wide)  Home Layout: One level  Home Access: Stairs to enter without rails, Ramped entrance (B hand rails on ramp, not by steps)  Entrance Stairs - Number of Steps: 1 step in addition to ramp  Bathroom Shower/Tub: Tub/Shower unit  [employer]: Standard (sink close by for support)  Bathroom Accessibility: Walker accessible  Home Equipment: 4 wheeled walker  Receives Help From: Family, Friend(s)  ADL Assistance: Independent  Homemaking Assistance: Independent  Homemaking Responsibilities: Yes  Ambulation Assistance: Independent  Transfer Assistance: Independent (used 4WW PRN)  Active : Yes  Mode of Transportation: SUV  IADL Comments: pt reports sleeping in flat bed and denies any recent falls  Additional Comments: Pt has 2 sons, 1 son recently broke his leg, but his GF is able to assist pt and provide 24/hr care (GF does not work). Pt's second son is due to come home Sunday or Monday but works full time. per pt has friends and family to provide transport and meals. Family Education: Need to make contact with family to initiate education    Percentage Risk for Readmission: Low 0 - 18%   Risk of Unplanned Readmission:  7 %    Critical Items: None       Problem / Barrier Intervention / Plan  Results   Impaired function related to decreased ROM/strength S/P B TKA ROM, strengthening, functional mobility with assistive device    Decreased tolerance to activity Rest PRN,  minutes/week for PT/OT. Altered ADL status related to Bilateral Knee Replacements  Training in use of devices and modified Care strategies to support ADL independence                          Total Self Care Score    Total Mobility Score  Admission Score:  17      Admission Score:  29  Goal:  42         Goal:  72   `  Discharge Plan   Estimated Discharge Date: 10/5/21  Home evaluation needed?  Home Evaluation Indication (NO, Requires ReEval, YES/Date): No home evaluation need indicated for patient at this time  Overnight or Day Pass: No  Factors facilitating achievement of predicted outcomes: Family support, Motivated, Cooperative and Pleasant  Barriers to the achievement of predicted outcomes: Pain, Anxiety, Decreased endurance, Lower extremity weakness, Long standing deficits, Incontinence of bladder, Medical complications and Skin Care    Functional Goals at discharge:  Predicted Outcome: Home with familyPATIENT'S LEVEL OF ASSISTANCE: Modified independence  Discharge therapy goals:  PT: Long term goals  Time Frame for Long term goals : Until d/c  Long term goal 1: pt to demonstrate Bilateral Knee AROM of 105 degrees knee flexion to demo improved joint mechanics for safe functional mobility   Long term goal 2: pt to improve overall BLE strength to 4/5 to improve strength and safety during functional transfers and mobility  Long term goal 3: pt to ambulate 100' with RW Mod I to improve safety and tolerance to amb household distances  Long term goal 4: pt to demo negotiation of 10' ramp with RW and 1 step with SBA to allow for safe home access  Long term goal 5: pt to perform all bed mobility on flat bed with or w/o rails Mod I to decrease burden of care  Long term goal 6: pt to independently perform HEP strengthening and ROM with good technique to demo indepence with self-care routine  Long term goal 7: pt to amb 76' with RW in 2 minutes to demo improved gait speed to improve safety with household ambulation  OT:Long term goals  Time Frame for Long term goals : By Discharge  Long term goal 1: Pt will complete BADL's with Mod I and Good safety using AE as needed. Long term goal 2: Pt will complete functional transfers with Mod I and Good safety using least restrictive device. Long term goal 3: Pt will tolerate standing for 10 minutes with 1-2 UE support and no LOB while completing a functional task. Long term goal 4: Pt will complete light housekeeping/simple meal prep with SBA and Good safety using least restrictive device. Long term goal 5: Pt will V/D at least 3 non-pharmacological pain mgt techniques and 3 EC/WS strategies that she can utilize during home routines.   ST:     Team Members Present at Conference:  :  Ho Judge  Occupational Therapist: Kumar Castro OT   Physical Therapist: Fernanda Vergara PT  Speech Therapist:  N/A  Nurse: Juan Carlos Elam   Dietary/Nutrition: Samia Christie RD, LD  Pastoral Care: Mahnaz Ellis  CMG: David Rodgers, JOSE     I approve the established interdisciplinary plan of care as documented within the medical record of [facility]. Rony Cardona.  Judah Manning MD

## 2021-09-27 NOTE — PLAN OF CARE
Problem: Falls - Risk of:  Goal: Will remain free from falls  Description: Will remain free from falls  9/27/2021 1522 by Shila Asif II, LPN  Outcome: Ongoing  9/27/2021 0412 by Landy Coe RN  Outcome: Ongoing  Goal: Absence of physical injury  Description: Absence of physical injury  9/27/2021 1522 by Shila Asif II, LPN  Outcome: Ongoing  9/27/2021 0412 by Landy Coe RN  Outcome: Ongoing     Problem: Skin Integrity:  Goal: Will show no infection signs and symptoms  Description: Will show no infection signs and symptoms  9/27/2021 1522 by Shila Asif II, LPN  Outcome: Ongoing  9/27/2021 0412 by Landy Coe RN  Outcome: Ongoing  Goal: Absence of new skin breakdown  Description: Absence of new skin breakdown  9/27/2021 1522 by Shila Asif II, LPN  Outcome: Ongoing  9/27/2021 0412 by Landy Coe RN  Outcome: Ongoing     Problem: Pain:  Goal: Pain level will decrease  Description: Pain level will decrease  9/27/2021 1522 by Shila Asif II, LPN  Outcome: Ongoing  9/27/2021 0412 by Landy Coe RN  Outcome: Ongoing  Goal: Control of acute pain  Description: Control of acute pain  9/27/2021 1522 by Shila Asif II, LPN  Outcome: Ongoing  9/27/2021 0412 by Landy Coe RN  Outcome: Ongoing  Goal: Control of chronic pain  Description: Control of chronic pain  9/27/2021 1522 by Shila Asif II, LPN  Outcome: Ongoing  9/27/2021 0412 by Landy Coe RN  Outcome: Ongoing     Problem: Musculor/Skeletal Functional Status  Goal: Highest potential functional level  9/27/2021 1522 by Shila Asif II, LPN  Outcome: Ongoing  9/27/2021 0412 by Landy Coe RN  Outcome: Ongoing  Goal: Absence of falls  9/27/2021 1522 by Karyn Covington LPN  Outcome: Ongoing  9/27/2021 0412 by Landy Coe RN  Outcome: Ongoing     Problem: Nutrition  Goal: Optimal nutrition therapy  Outcome: Ongoing

## 2021-09-27 NOTE — PROGRESS NOTES
Physical Therapy  [address] [facility]   Acute Rehabilitation Physical Therapy Progress Note    Date: 21  Patient Name: David Patel       Room: 1358/3458-20  MRN: [id number]   Account: [de-identified]   : 1960  (64 y.o.) Gender: female     Referring Practitioner: Cristina Rinaldi MD/Dr. Raquel Stewart  Diagnosis: B TKA 21  Past Medical History:  has a past medical history of Anxiety, Arthritis, Depression, Hypertension, Knee pain, Murmur, PONV (postoperative nausea and vomiting), and Sleep apnea. Past Surgical History:   has a past surgical history that includes Rotator cuff repair (Left); tumor removal; Ulnar tunnel release (Right); skin biopsy; eye surgery (Left, 2018); and knee surgery (Bilateral, 2021). Additional Pertinent Hx: pt is a 64 y.o. woman recently admitted to hospital for Bilateral TKA procedure on 21 by Dr. Marina Dodson. Pt with initial diagnosis of Bilateral knee Degenerative Joint Disease and H/O osteoarthritis, sleep apnea (no machine), and HTN. Admitted to [facility] on 21       Restrictions/Precautions  Restrictions/Precautions: Weight Bearing;Surgical Protocols; Fall Risk  Required Braces or Orthoses?: No  Implants present? : Metal implants (B TKA, Metal plate and screws in RUE)  Lower Extremity Weight Bearing Restrictions  Right Lower Extremity Weight Bearing: Weight Bearing As Tolerated  Left Lower Extremity Weight Bearing: Weight Bearing As Tolerated  Position Activity Restriction  Other position/activity restrictions: FWBAT RLE/LLE    Subjective: Pt nauseated this morning. Agreeable to work with therapy bedside. Pt continues to not feel well and be nauseated in the afternoon. Reported just getting up and using bathroom with O.T. declining working with P.T. in gym or bedside at this time.        Vital Signs  Patient Currently in Pain: Yes  Pain Assessment: 0-10  Pain Level: 7  Pain Type: Acute pain  Pain Location: Knee  Pain Orientation: Right;Left Bed Mobility:   Bed Mobility  Sit to Supine: Moderate assistance (assist with (B) LE's)  Comment: completed on flat surface with use of bed rails. Transfers:  Sit to Stand: Contact guard assistance  Stand to sit: Contact guard assistance              WB Status: FWBAT BLEs  Ambulation 1  Surface: level tile  Device: Rolling Walker  Other Apparatus: O2 (2)  Assistance: Contact guard assistance  Distance: few steps taken in room from w/c to EOB using RW. 2-3ft total  Comments: Nauseated and requesting to lye back down. EXERCISES    Other exercises?: Yes  Other exercises 1: (B) LE seated ex x 15 including orange HS curls x 15  Other exercises 2: (B) LE supine ex x 10, orange maxi tube used to decrease friction/drag on LE's           Activity Tolerance: Other (nausea)  PT Equipment Recommendations  Equipment Needed: Yes  Mobility Devices: Shaan Walker: Rolling  Other: Need for RW TBD, pt has a 4WW but may require a more stable AD upon d/c  Other Comments  Comments: EPC cuffs placed on pt along with cryo cuffs at end of am therapy session. Current Treatment Recommendations: Strengthening, ROM, Balance Training, Functional Mobility Training, Transfer Training, Endurance Training, Gait Training, Stair training, Pain Management, Home Exercise Program, Safety Education & Training, Patient/Caregiver Education & Training, Equipment Evaluation, Education, & procurement    Conditions Requiring Skilled Therapeutic Intervention  Body structures, Functions, Activity limitations: Decreased functional mobility ; Decreased ROM; Decreased strength;Decreased endurance;Decreased balance; Increased pain  Assessment: pt performs bed mobility with MaxAx1, transfers with Mod-MaxAx1 with RW, Amb x45' with CGA, VCs for technique, and RW; Requires MinAx1 for negotiating 1 step and overall demonstrates BLE weakness, decreased ROM, low endurance, and decreased balance.  PT services are warranted to address the following impairments and

## 2021-09-27 NOTE — PLAN OF CARE
Problem: Falls - Risk of:  Goal: Will remain free from falls  Description: Will remain free from falls  9/27/2021 0412 by Lexi Rosales RN  Outcome: Ongoing     Problem: Falls - Risk of:  Goal: Absence of physical injury  Description: Absence of physical injury  9/27/2021 0412 by Lexi Rosales RN  Outcome: Ongoing     Problem: Skin Integrity:  Goal: Will show no infection signs and symptoms  Description: Will show no infection signs and symptoms  9/27/2021 0412 by Lexi Rosales RN  Outcome: Ongoing     Problem: Skin Integrity:  Goal: Absence of new skin breakdown  Description: Absence of new skin breakdown  9/27/2021 0412 by Lexi Rosales RN  Outcome: Ongoing     Problem: Pain:  Goal: Pain level will decrease  Description: Pain level will decrease  9/27/2021 0412 by Lexi Rosales RN  Outcome: Ongoing     Problem: Pain:  Goal: Control of acute pain  Description: Control of acute pain  9/27/2021 0412 by Lexi Rosales RN  Outcome: Ongoing     Problem: Pain:  Goal: Control of chronic pain  Description: Control of chronic pain  9/27/2021 0412 by Lexi Rosales RN  Outcome: Ongoing     Problem: Musculor/Skeletal Functional Status  Goal: Highest potential functional level  9/27/2021 0412 by Lexi Rosales RN  Outcome: Ongoing     Problem: Musculor/Skeletal Functional Status  Goal: Absence of falls  9/27/2021 0412 by Lexi Rosales RN  Outcome: Ongoing

## 2021-09-27 NOTE — PROGRESS NOTES
Physical Medicine & Rehabilitation  Progress Note    9/27/2021 9:42 AM     CC: Ambulatory and ADL dysfunction due to bilateral total knee arthroplasty    Subjective:   Feels well. No complaints. Positive BM. Continues on oxygen 2.5 L-was not on at home    ROS:  Denies fevers, chills, sweats. No chest pain, palpitations, lightheadedness. Denies coughing, wheezing or shortness of breath. Denies abdominal pain, nausea, diarrhea or constipation. No new areas of joint pain. Denies new areas of numbness or weakness. Denies new anxiety or depression issues. No new skin problems. Rehabilitation:   PT:  Restrictions/Precautions: Weight Bearing, Surgical Protocols, Fall Risk  Implants present? : Metal implants (B TKA, Metal plate and screws in R UE)  Other position/activity restrictions: FWBAT RLE/LLE  Right Lower Extremity Weight Bearing: Weight Bearing As Tolerated  Left Lower Extremity Weight Bearing: Weight Bearing As Tolerated   Transfers  Sit to Stand: Minimal Assistance  Stand to sit: Contact guard assistance  Bed to Chair: Contact guard assistance  Stand Pivot Transfers: Contact guard assistance (RW)  Comment: slow guarded movements, Pt report increased stiffness. Pt instructed in ex to perform while sitting i chair or bed in between treatment. WB Status: FWBAT BLEs  Ambulation 1  Surface: level tile  Device: Rolling Walker  Other Apparatus: O2 (2)  Assistance: Contact guard assistance (With w/c following)  Quality of Gait: Very slow & antalgic gait, Wide LUX, pt moves slowly/cautiously with dec bilateral step height and length. Heavy BUEs WB on RW  Gait Deviations: Slow Janet, Increased LUX, Decreased step length, Decreased step height  Distance: 25 x1', 40'x 1  Comments: pt easily fatigued with SOB, Encouraged to take rest break PRN.           OT:  ADL  Feeding: Modified independent  (Dentures; Poor appetite 2* nausea)  Grooming: Setup (Washed face/brushed hair w/c level at sink)  UE Bathing: Setup (w/c level at sink)  LE Bathing: Maximum assistance  UE Dressing: None (Pt declined getting dressed this date)  LE Dressing: Dependent/Total (TA for socks; Declined underwear/pants this date)  Toileting: Dependent/Total (TA for hygiene this date; No clothing)  Additional Comments: Pt agreeable to attempting ADL at sink this date. Pt required increased time and encouragement for all tasks. Rest breaks needed 2* pain, nausea, and fatigue. Pt completed grooming/UB ADL's at above levels. Pt did not want to put clothing on today but did don clean hospital gowns and slipper socks. Pt transferred to toilet using RW with CGA/Min A and attempted to have BM. OT offered footstool 2* pt's feet do not reach floor but pt preferred to rest them on the inside wheels of her RW. Pt became nauseous while sitting on the toilet and had an episode of syncope when she stood up. OT provided A for washing analia-area and buttocks. Pt transferred back to w/c and OT assisted with washing B lower legs/feet and donning TEDs/socks. Pt with increasing nausea at end of session, RN aware. Balance  Sitting Balance: Supervision  Standing Balance: Minimal assistance (CGA/Min A with RW )            Bed mobility  Supine to Sit: Moderate assistance  Scooting: Moderate assistance  Comment: Pt left sitting up in w/c at end of session  Transfers  Stand Step Transfers: Contact guard assistance, Minimal assistance  Stand Pivot Transfers: Contact guard assistance, Minimal assistance  Sit to stand: Moderate assistance (from EOB; CGA/Min A from w/c and toilet)  Stand to sit: Contact guard assistance  Transfer Comments: Pt initially CGA for transfers with RW but as fatigue/pain/nausea worsened she required Min A for safety.    Toilet Transfers  Toilet - Technique: Stand step  Equipment Used: Raised toilet seat with rails  Toilet Transfer: Contact guard assistance, Minimal assistance  Toilet Transfers Comments: w/RW        Wheelchair Bed PROTIME, INR in the last 72 hours. APTT: No results for input(s): APTT in the last 72 hours. CARDIAC ENZYMES: No results for input(s): CKMB, CKMBINDEX, TROPONINT in the last 72 hours. Invalid input(s): CKTOTAL;3  FASTING LIPID PANEL:No results found for: CHOL, HDL, TRIG  LIVER PROFILE: No results for input(s): AST, ALT, ALB, BILIDIR, BILITOT, ALKPHOS in the last 72 hours. I/O (24Hr): Intake/Output Summary (Last 24 hours) at 9/27/2021 0942  Last data filed at 9/26/2021 1000  Gross per 24 hour   Intake --   Output 300 ml   Net -300 ml       Glu last 24 hour  No results for input(s): POCGLU in the last 72 hours. No results for input(s): CLARITYU, COLORU, PHUR, SPECGRAV, PROTEINU, RBCUA, BLOODU, BACTERIA, NITRU, WBCUA, LEUKOCYTESUR, YEAST, John Riding in the last 72 hours.         Impression/Plan:   Impaired ADLs, gait, and mobility due to:     1. Bilateral knee osteoarthritis s/p bilateral total knee arthroplasty:  Procedure done 9/21/21.  WBAT to the bilateral lower limbs.  Continue rehab efforts, Follow up with Dr. Keerthi Grande as outpatient, team conference in a.m.  2. Pain-tylenol and oxycodone as needed.  . Using 10 mg of oxycodone-discussed with patient to begin tapering  3. Bilateral hand cramps:  Patient reports that she has had these intermittently in the past.  Electrolytes WNL, including magnesium. Heat/ice as needed. Will DC lidocaine cream to hands, not using, monitor  4. HTN:  On amlodipine, lisinopril  5. Clinical COPD:  Per pulmonology.  On duo-neb 4 times daily.  Will need PFTs as outpatient.,    6. KEVIN:  Did not tolerate nocturnal bipap.  Continue nasal cannula oxygen at night (at least 2-3 L to keep O2 sat > 88%).  Will need sleep study as outpatient. 7. Depression, anxiety:  Not currently on medication  8. Morbid obesity:  BMI 46.09  9. Bowel Management: Miralax daily, senokot prn, dulcolax prn, milk of magnesia prn. 10. DVT Prophylaxis:  aspirin BID  11.  Internal Medicine for medical management    Daphine Romberg. Anand Plascencia MD       This note is created with the assistance of a speech recognition program.  While intending to generate a document that actually reflects the content of the visit, the document can still have some errors including those of syntax and sound a like substitutions which may escape proof reading.   In such instances, actual meaning can be extrapolated by contextual diversion

## 2021-09-27 NOTE — PROGRESS NOTES
[facility]: [facility]   ACUTE REHABILITATION OCCUPATIONAL THERAPY  DAILY NOTE    Date: 21  Patient Name: Mike Diaz      Room: 3024/5111-75    MRN: [id number]   : 1960  (64 y.o.)  Gender: female   Referring Practitioner: Bing Quiroz MD/Dr. Dayana Christy  Diagnosis: B TKA 21    Restrictions  Restrictions/Precautions: Weight Bearing, Surgical Protocols, Fall Risk  Implants present? : Metal implants (B TKA, Metal plate and screws in RUE)  Other position/activity restrictions: FWBAT RLE/LLE  Right Lower Extremity Weight Bearing: Weight Bearing As Tolerated  Left Lower Extremity Weight Bearing: Weight Bearing As Tolerated  Required Braces or Orthoses?: No  Equipment Used: Bed, Wheelchair    Subjective  Subjective: \"I am so tired\"  Comments: Pt initially denied nausea, but after getting up and taking her meds she had two bouts of emesis. Nursing notified and pt able to continue with session. Patient Currently in Pain: Yes  Pain Level: 6  Pain Location: Knee  Pain Orientation: Right;Left  Restrictions/Precautions: Weight Bearing;Surgical Protocols; Fall Risk  Overall Orientation Status: Within Functional Limits     Pain Assessment  Pain Assessment: 0-10  Pain Level: 6  Pain Type: Acute pain, Surgical pain  Pain Location: Knee  Pain Orientation: Right, Left  Pain Descriptors: Aching  Pain Frequency: Continuous  Response to Pain Intervention: None (pt's pain in bilat knees inc with mobility/activity)    Objective  Cognition  Overall Cognitive Status: WFL  Perception  Overall Perceptual Status: WFL  Balance  Sitting Balance: Supervision  Standing Balance: Contact guard assistance (w/RW; briefly stood unsupported during toileting w/no LOB)  Bed mobility  Supine to Sit: Moderate assistance  Scooting: Minimal assistance (@ EOB)  Comment: Pt left sitting up in w/c at end of session in AM and reclining chair in PM.  Transfers  Stand Step Transfers: Contact guard assistance  Stand Pivot Transfers: Contact guard assistance  Sit to stand: Minimal assistance (from all surfaces today; pulls up on walker from EOB)  Stand to sit: Contact guard assistance  Transfer Comments: Sit>stand improved to CGA in PM  Standing Balance  Time: Several 1-2 minutes trials  Activity: LB bathing/dressing and toileting  Comment: CGA with 1-2 UE support, briefly able to stand unsupported during toileting tasks  Functional Mobility  Functional - Mobility Device: Rolling Walker  Activity: To/from bathroom  Assist Level: Contact guard assistance  Functional Mobility Comments: Good safety, no LOB, slow pace  Toilet Transfers  Toilet - Technique: Stand step  Equipment Used: Raised toilet seat with rails  Toilet Transfer: Contact guard assistance;Minimal assistance  Toilet Transfers Comments: CGA for all aspects of transfer except sit>stand, requiring Min A in AM (improved to CGA in PM)     Vision - Basic Assessment  Patient Visual Report: No visual complaint reported. Vision  Patient Visual Report: No visual complaint reported. ADL  Feeding: Modified independent  (Dentures, slight improvement in appetite per pt)  Grooming: Setup (w/c level at sink)  UE Bathing: Setup (w/c level at sink)  LE Bathing: Moderate assistance (A for buttocks and lower legs/feet)  UE Dressing: Stand by assistance (Donned sports bra and [place] dress)  LE Dressing: Stand by assistance (Don/doffed socks w/SBA and AE; No pants this date)  Toileting: Minimal assistance (Managed pull-up w/CGA, analia-hygiene seated, TA for buttocks)  Additional Comments: Pt continues to require encouragement and increased time to complete ADL's. Pt with 2 bouts of emesis during ADL's, nursing informed and pt able to continue with session. OT initiated education re: AE for don/doffing socks. Pt successfully doffed socks using recher with SBA. OT loaded sock onto sock-aid 2* pt having hand cramps, pt able to complete remainder of task without A.   Pt reported that pull-up brief did not need changed and declined pants 2* wearing dress today. Assessment  Performance deficits / Impairments: Decreased functional mobility ; Decreased ADL status; Decreased ROM; Decreased strength;Decreased safe awareness;Decreased endurance;Decreased balance;Decreased high-level IADLs;Decreased coordination  Prognosis: Good  Discharge Recommendations: Home with assist PRN  Activity Tolerance: Patient limited by fatigue;Patient limited by pain;Treatment limited secondary to medical complications (free text)  Activity Tolerance: Nausea/emesis, B knee pain/stiffness, general malaise and fatigue. Nursing aware and tx continued within pt's tolerance. Safety Devices in place: Yes  Type of devices: Patient at risk for falls;Gait belt;Left in chair;Call light within reach    Patient Education:  Patient Goals   Patient goals : To return to independence   Activity promotion, pain mgt, ADL/AE training, safety  Learner:patient  Method: demonstration and explanation       Outcome: demonstrated understanding and needs reinforcement     Plan  Plan  Times per week: 900/7  Times per day: Twice a day  Current Treatment Recommendations: Strengthening, ROM, Balance Training, Functional Mobility Training, Endurance Training, Pain Management, Safety Education & Training, Patient/Caregiver Education & Training, Equipment Evaluation, Education, & procurement, Self-Care / ADL, Home Management Training  Patient Goals   Patient goals : To return to independence  Short term goals  Time Frame for Short term goals: One Week  Short term goal 1: Pt will complete UB bathing/dressing at Mod I level. Short term goal 2: Pt will complete LB bathing/dressing with Mod A and Good safety using AE as needed. Short term goal 3: Pt will complete toilet transfer and toileting tasks with Min A and Good safety using least restrictive device.   Short term goal 4: Pt will tolerate standing for 5+ minutes with 1-2 UE support, SBA, and no LOB while completing a

## 2021-09-27 NOTE — CONSULTS
[facility]    CONSULTATION    / FOLLOW UP VISIT       Date:   9/27/2021  Patient name:  Ambar Burgos  Date of admission:  9/24/2021  3:48 PM  MRN:   [id number]  Account:  [de-identified]  YOB: 1960  PCP:    Marley Aparicio  Room:   2608/2608-01  Code Status:    Full Code    Physician Requesting Consult: Perlita Lares MD    History of Present Illness:      C/C ;  Medical comorbidity management     REASON FOR CONSULT;  Medical comorbidity and medication management ;                                                 *Principal Problem:    S/P total knee arthroplasty, bilateral  Active Problems: Morbid obesity ([facility] [address]    History of tobacco use    COPD (chronic obstructive pulmonary disease) ([facility] [address]    Aftercare following bilateral knee joint replacement surgery  Resolved Problems:    * No resolved hospital problems. *           HPI;    S/p bilateral knee arthroplasty   Struct of sleep apnea   Known to have obstructive sleep apnea  Did have acute exacerbation of COPD  Was seen by Dr. Roderick Díaz hypertensive  Vitals:    09/26/21 1308 09/26/21 1424 09/26/21 1806 09/26/21 1939   BP:   123/67    Pulse:   82    Resp:  16 16    Temp:   98.6 °F (37 °C)    TempSrc:       SpO2:  93% 92% 94%   Weight:       Height: 5' 2\" (1.575 m)         The patient is a 64y.o.-year-old with ADL and mobility deficits secondary to knee osteoarthritis s/p bilateral total knee arthroplasty. She will require close medical monitoring for the comorbidities listed below. She will benefit from intensive interdisciplinary therapies and rehab nursing care and is appropriate for inpatient rehabilitation. The post admission physician evaluation (STEPHANIE) is consistent with the pre-admission assessment. See above findings to reflect the elements required in the STEPHANIE.   Patient's admitting condition is consistent with the findings of the preadmission assessment by the rehabilitation admissions coordinator. Diagnoses/plan:     1. Bilateral knee osteoarthritis s/p bilateral total knee arthroplasty:  Procedure done 9/21/21. WBAT to the bilateral lower limbs. PT/OT for gait, mobility, strengthening, endurance, ADLs, and self care. Pain control with tylenol and oxycodone as needed. Follow up with Dr. Aniyah Starks as outpatient. 2. Bilateral hand cramps:  Patient reports that she has had these intermittently in the past.  Electrolytes WNL. Will check magnesium. Heat/ice as needed. Added lidocaine cream as needed for pain. 3. HTN:  On amlodipine, lisinopril  4. Clinical COPD:  Per pulmonology. On duo-neb 4 times daily. Will need PFTs as outpatient. 5. KEVIN:  Did not tolerate nocturnal bipap. Continue nasal cannula oxygen at night (at least 2-3 L to keep O2 sat > 88%). Will need sleep study as outpatient. 6. Depression, anxiety:  Not currently on medication  7. Morbid obesity:  BMI 46.09  8. Bowel Management: Miralax daily, senokot prn, dulcolax prn, milk of magnesia prn.  9. DVT Prophylaxis:  aspirin BID  10. Internal Medicine for medical management          Past and Surgical hx as in H and P  Social History:     Tobacco:    reports that she has been smoking. She has been smoking about 1.00 pack per day. She has never used smokeless tobacco.  Alcohol:      reports previous alcohol use. Drug Use:  reports previous drug use. Review of Systems:     POSITIVE AND NEGATIVES AS DESCRIBED IN HISTORY OF PRESENT ILLNESS ;  IN ADDITION ;  Review of Systems          All other systems negative                Physical Exam:     Physical Exam   Vitals:    09/26/21 1308 09/26/21 1424 09/26/21 1806 09/26/21 1939   BP:   123/67    Pulse:   82    Resp:  16 16    Temp:   98.6 °F (37 °C)    TempSrc:       SpO2:  93% 92% 94%   Weight:       Height: 5' 2\" (1.575 m)                      Body mass index is 46.09 kg/m².      General Appearance:   -, CO-OPERATIVE , Pulmonary/Chest:        Clear to auscultation bilaterally . No wheezes, rales or rhonchi . Cardiovascular:            Normal rate, regular rhythm,                                          No murmur or  Gallop . Abdomen:                       Soft, non-tender                                                                                    Extremities:                    No Edema . Neuromuskuloskeletal    ... Data:     URINE ANALYSIS: No results found for: LABURIN     CBC:  Lab Results   Component Value Date    WBC 9.4 09/25/2021    HGB 12.4 09/25/2021     09/25/2021        BMP:    Lab Results   Component Value Date     09/25/2021    K 4.6 09/25/2021     09/25/2021    CO2 29 09/25/2021    BUN 12 09/25/2021    CREATININE 0.53 09/25/2021    GLUCOSE 139 09/25/2021      LIVER PROFILE:No results found for: ALT, AST, PROT, BILITOT, BILIDIR, LABALBU          Radiology:         Medications: Allergies:  No Known Allergies    Current Meds:   Scheduled Meds:    aspirin  81 mg Oral BID    sennosides-docusate sodium  1 tablet Oral BID    amLODIPine  10 mg Oral Daily    ipratropium-albuterol  1 ampule Inhalation 4x daily    lisinopril  20 mg Oral Daily    polyethylene glycol  17 g Oral Daily     Continuous Infusions:   PRN Meds: magnesium hydroxide, oxyCODONE **OR** oxyCODONE, acetaminophen, senna, bisacodyl        Assessment :       Assessment Dx  Principal Problem:    S/P total knee arthroplasty, bilateral  Active Problems: Morbid obesity ([address]    History of tobacco use    COPD (chronic obstructive pulmonary disease) ([facility] [address]    Aftercare following bilateral knee joint replacement surgery  Resolved Problems:    * No resolved hospital problems.  *              Plan:     Patient s/p bilateral knee arthroplasty   Has advanced COPD obstructive sleep apnea morbid obesity   Dissecting down in rehab      9/27/2021    No new complaints/symptoms . Symptoms or ailment  course ;                                   are improving over time. · Continue present regimen 1. Thanks for consulting us . Will monitor vitals and clinical course , and  Optimize therapy  as needed . Melissa Tobin MD    Copy sent to Dr. Natalya Patterson that this chart was generated using voice recognition Dragon dictation software. Although every effort was made to ensure the accuracy of this automated transcription, some errors in transcription may have occurred.

## 2021-09-27 NOTE — PROGRESS NOTES
Liam Rubio RN   Registered Nurse   Case Management   Progress Notes       Signed   Date of Service:  2021  1:44 PM         Related encounter: Admission (Discharged) from 2021 in [facility] all  [x]Manual[x]Template[x]Copied    Added by:  [x]Sonya Berman RN    []Ulisses for details  Clifton Mcguire  Acute Inpatient Rehab Preadmission Assessment     Patient Name: Rony Mark        MRN:   [id number]    : 1960  (64 y.o.)  Gender: female      Admitted from:   [x]? [facility]  []? [address]   []? [address]   []? [facility]   []? Outside Admission - Location:                                 [x]? Initial         []? Updated     Date of Onset / Admission to the acute hospital: 21     Inpatient Rehabilitation Admitting Diagnosis:  Bilat TKA     Did patient have surgery/procedures? []? No  [x]? Yes:       Procedure(s):  KNEE TOTAL ARTHROPLASTY BILATERAL     Physicians: Mildred Rodgers for clinical complications: Moderate     Co-morbidities:       1. Acute hypercapnic and hypoxic respiratory failure  2. Leukocytosis  3. HTN  4. Clinical diagnosis of COPD  5. KEVIN  6. Depression  7. Anxiety  8. Obesity     Financial Information  Primary insurance:  []? Medicare     []? Medicare HMO      []? [employer]    []? Medicaid      [x]? Medicaid HMO       []? Workers Compensation        []? Personal Pay     Secondary Insurance:  []? Medicare     []? Medicare HMO      []? [employer]    []? Medicaid      []? Medicaid HMO        []? Workers Compensation      [x]? None     Precautions:   []? Cardiac Precautions:            []? Total hip precautions:           [x]? Weight Bearing status: FWBAT BLE  [x]? Safety Precautions/Concerns:  Fall Risk, General Precautions  [x]? Visually impaired:  Wears glasses at all times                     []?Hard of Hearing:      Isolation Precautions:         []? Yes              [x]? No  If Yes:   []? Droplet  []? Contact []?Airborne     []? VRE     []? MRSA        []? C-diff         []? TB             []? ESBL         []? MDRO          []? Other:                        Physiatrist:  []? Dr. Kavin Mead     []? Dr. Sudha Callejas  [x]? Dr. Penelope Perez  []? Dr. Khurram Cisneros     Patients Occupation:  Unknown     Reviewed Lab and Diagnostic reports from Current Admission: Yes     Patients Prior Functional  Level: Prior Function  Receives Help From: Family, Friend(s)  ADL Assistance: Independent  Homemaking Assistance: Independent  Ambulation Assistance: Independent  Transfer Assistance: Independent (used 4WW PRN)  Additional Comments: Pt has 2 sons, 1 son recently broke his leg, but his GF is able to assist pt and provide 24/hr care (GF does not work). Pt's second son is due to come home Sunday or Monday but works full time. per pt has firned and family to provide transport and meals.     History of current illness, per PM&R Consult:  Jessica Dodge a 64 y.o. female with history of bilateral knee osteoarthritis, HTN, KEVIN, depression, and anxiety admitted to [facility] 9/21/2021.       She initially presented for elective bilateral total knee arthroplasty, which took place on 9/21/21 (Dr. Aniyah Starks). [facility] course was complicated by acute hypercapnic and hypoxic respiratory failure after surgery, which was treated with bipap.     She currently reports feeling fatigued after therapies.  She also notes ongoing pain in the bilateral knees.  She denies any numbness/tingling in the bilateral lower limbs.     Prognosis: Fair     Current functional status for upper extremity ADLs:  UE Bathing: Contact guard assistance  UE Dressing: Contact guard assistance     Current functional status for lower extremity ADLs:  LE Bathing: Maximum assistance  LE Dressing: Setup (pt able to osorio socks after sock aid education)     Current functional status for bed, chair, wheelchair transfers:  Transfers  Sit to Stand:  Moderate Assistance, 2 Person Assistance, Minimal Assistance (Second person Min A x1)  Stand to sit: Contact guard assistance, 2 Person Assistance  Bed to Chair: 2 Person Assistance (CGAx2 for line/lead management and safety)  Stand Pivot Transfers: 2 Person Assistance (CGAx2)  Comment: All transfers performed with RW. VCs for hand placement. Pt continues to require two person A to stand from EOB and BSC at this time. Second person Min A for safety. Pt initialy demos a very forward flexed posture however able to correct with increase time and cues. Edu provided on the importance of improving posture for energy conservation and stability.      Current functional status for toilet transfers: Toilet Transfers  Toilet - Technique: Ambulating (with RW)  Equipment Used: Extra wide bedside commode  Toilet Transfer: 2 Person assistance, Moderate assistance  Toilet Transfers Comments: cues for hand placement for safety; increased time to complete due to pain     Current functional status for locomotion:  Ambulation  Ambulation?: Yes  WB Status: FWBAT BLEs  More Ambulation?: Yes  Ambulation 1  Surface: level tile  Device: Rolling Walker  Other Apparatus: O2  Assistance: Contact guard assistance, 2 Person assistance  Quality of Gait: inc pain with WB, antalgic gait, Wide LUX, pt moves slowly/cautiously and barely clears BLEs from floor. Edu provided on the importance of improving heel strike and increasing WB into BLE's to offload UE's energy expediture. Gait Deviations: Slow Janet, Increased LUX, Decreased step length, Decreased step height  Distance: 6'x2  Comments: Declined sitting up in chair at this time despite edu.  Just wanted to get back into bed to take a nap.     Current functional status for comprehension: Complete independence     Current functional status for expression: Complete independence     Current functional status for social interaction: Complete independence     Current functional status for problem solving: Complete independence     Current functional status for memory: Complete independence     Current Deficits R/T Impairment: Impaired Functional Mobility and Decreased ADLs     Required Therapy:   [x]? Physical Therapy  [x]? Occupational Therapy   []? Speech Therapy, as appropriate     Additional Services:    [x]?     []? Recreational Therapy, as appropriate    [x]? Nutrition    []? Dialysis  []? Cultural Needs Identified  []? Special Equipment Needs  []? Other     Rehab Justification:  Needs 3 hrs therapy per day or 15 hours per week:  Yes  Identified Rehab Nursing needs: Yes  Intense Interdisciplinary need:  Yes  Need for 24 hr physician supervision:  Yes  Measurable improved quality of life:  Yes  Willingness to participate:  Yes  Medical Necessity:  Yes  Patient able to tolerate care proposed:   Yes     Expected Discharge Destination/Functional Level:  Home with assist  Expected length of time to achieve that level of improvement: 1-2 weeks  Expected Post Discharge Treatments: Home with possible Home Care     Other information relevant to patient's care needs:  N/A     Acute Inpatient Rehabilitation Disclosure Statement will be provided to patient upon admission to ARU with patient's verbalization of understanding.       I have reviewed and concur with the findings and results of the pre-admission screening assessment completed by the Inpatient Rehabilitation Admissions Coordinator.                  Cosigned by: Andrew Aponte MD at 9/24/2021  1:56 PM   Revision History

## 2021-09-28 PROCEDURE — 97110 THERAPEUTIC EXERCISES: CPT

## 2021-09-28 PROCEDURE — 94761 N-INVAS EAR/PLS OXIMETRY MLT: CPT

## 2021-09-28 PROCEDURE — 6370000000 HC RX 637 (ALT 250 FOR IP): Performed by: STUDENT IN AN ORGANIZED HEALTH CARE EDUCATION/TRAINING PROGRAM

## 2021-09-28 PROCEDURE — 6370000000 HC RX 637 (ALT 250 FOR IP): Performed by: ORTHOPAEDIC SURGERY

## 2021-09-28 PROCEDURE — 99232 SBSQ HOSP IP/OBS MODERATE 35: CPT | Performed by: INTERNAL MEDICINE

## 2021-09-28 PROCEDURE — 1180000000 HC REHAB R&B

## 2021-09-28 PROCEDURE — 97530 THERAPEUTIC ACTIVITIES: CPT

## 2021-09-28 PROCEDURE — 6370000000 HC RX 637 (ALT 250 FOR IP): Performed by: PHYSICAL MEDICINE & REHABILITATION

## 2021-09-28 PROCEDURE — 99232 SBSQ HOSP IP/OBS MODERATE 35: CPT | Performed by: PHYSICAL MEDICINE & REHABILITATION

## 2021-09-28 PROCEDURE — 97535 SELF CARE MNGMENT TRAINING: CPT

## 2021-09-28 PROCEDURE — 97116 GAIT TRAINING THERAPY: CPT

## 2021-09-28 PROCEDURE — 94640 AIRWAY INHALATION TREATMENT: CPT

## 2021-09-28 RX ORDER — OXYCODONE HYDROCHLORIDE 10 MG/1
10 TABLET ORAL EVERY 6 HOURS PRN
Status: DISCONTINUED | OUTPATIENT
Start: 2021-09-28 | End: 2021-10-01

## 2021-09-28 RX ORDER — OXYCODONE HYDROCHLORIDE 5 MG/1
5 TABLET ORAL EVERY 6 HOURS PRN
Status: DISCONTINUED | OUTPATIENT
Start: 2021-09-28 | End: 2021-10-05 | Stop reason: HOSPADM

## 2021-09-28 RX ORDER — ONDANSETRON 4 MG/1
4 TABLET, ORALLY DISINTEGRATING ORAL EVERY 6 HOURS PRN
Status: DISCONTINUED | OUTPATIENT
Start: 2021-09-28 | End: 2021-10-05 | Stop reason: HOSPADM

## 2021-09-28 RX ADMIN — IPRATROPIUM BROMIDE AND ALBUTEROL SULFATE 1 AMPULE: .5; 3 SOLUTION RESPIRATORY (INHALATION) at 06:44

## 2021-09-28 RX ADMIN — LISINOPRIL 20 MG: 20 TABLET ORAL at 08:45

## 2021-09-28 RX ADMIN — ACETAMINOPHEN 650 MG: 325 TABLET ORAL at 14:45

## 2021-09-28 RX ADMIN — IPRATROPIUM BROMIDE AND ALBUTEROL SULFATE 1 AMPULE: .5; 3 SOLUTION RESPIRATORY (INHALATION) at 11:40

## 2021-09-28 RX ADMIN — OXYCODONE HYDROCHLORIDE 10 MG: 10 TABLET ORAL at 08:46

## 2021-09-28 RX ADMIN — POLYETHYLENE GLYCOL 3350 17 G: 17 POWDER, FOR SOLUTION ORAL at 08:46

## 2021-09-28 RX ADMIN — ONDANSETRON 4 MG: 4 TABLET, ORALLY DISINTEGRATING ORAL at 13:10

## 2021-09-28 RX ADMIN — ASPIRIN 81 MG: 81 TABLET, COATED ORAL at 08:45

## 2021-09-28 RX ADMIN — ASPIRIN 81 MG: 81 TABLET, COATED ORAL at 20:00

## 2021-09-28 RX ADMIN — OXYCODONE HYDROCHLORIDE 10 MG: 10 TABLET ORAL at 20:00

## 2021-09-28 RX ADMIN — DOCUSATE SODIUM 50MG AND SENNOSIDES 8.6MG 1 TABLET: 8.6; 5 TABLET, FILM COATED ORAL at 08:46

## 2021-09-28 RX ADMIN — AMLODIPINE BESYLATE 10 MG: 10 TABLET ORAL at 08:45

## 2021-09-28 RX ADMIN — DOCUSATE SODIUM 50MG AND SENNOSIDES 8.6MG 1 TABLET: 8.6; 5 TABLET, FILM COATED ORAL at 20:00

## 2021-09-28 RX ADMIN — OXYCODONE HYDROCHLORIDE 10 MG: 10 TABLET ORAL at 02:32

## 2021-09-28 RX ADMIN — ACETAMINOPHEN 650 MG: 325 TABLET ORAL at 20:00

## 2021-09-28 RX ADMIN — IPRATROPIUM BROMIDE AND ALBUTEROL SULFATE 1 AMPULE: .5; 3 SOLUTION RESPIRATORY (INHALATION) at 15:51

## 2021-09-28 ASSESSMENT — PAIN SCALES - GENERAL
PAINLEVEL_OUTOF10: 8
PAINLEVEL_OUTOF10: 3
PAINLEVEL_OUTOF10: 4
PAINLEVEL_OUTOF10: 3
PAINLEVEL_OUTOF10: 7
PAINLEVEL_OUTOF10: 5
PAINLEVEL_OUTOF10: 4
PAINLEVEL_OUTOF10: 10
PAINLEVEL_OUTOF10: 4

## 2021-09-28 ASSESSMENT — PAIN DESCRIPTION - ORIENTATION
ORIENTATION: RIGHT;LEFT

## 2021-09-28 ASSESSMENT — PAIN DESCRIPTION - FREQUENCY: FREQUENCY: CONTINUOUS

## 2021-09-28 ASSESSMENT — PAIN DESCRIPTION - LOCATION
LOCATION: KNEE
LOCATION: KNEE
LOCATION: LEG;KNEE

## 2021-09-28 ASSESSMENT — PAIN DESCRIPTION - PROGRESSION: CLINICAL_PROGRESSION: NOT CHANGED

## 2021-09-28 ASSESSMENT — PAIN DESCRIPTION - PAIN TYPE: TYPE: ACUTE PAIN;SURGICAL PAIN

## 2021-09-28 ASSESSMENT — PAIN DESCRIPTION - ONSET: ONSET: ON-GOING

## 2021-09-28 ASSESSMENT — PAIN DESCRIPTION - DIRECTION: RADIATING_TOWARDS: LLE RLE

## 2021-09-28 ASSESSMENT — PAIN - FUNCTIONAL ASSESSMENT: PAIN_FUNCTIONAL_ASSESSMENT: ACTIVITIES ARE NOT PREVENTED

## 2021-09-28 NOTE — PROGRESS NOTES
Physical Therapy  Facility/Department: [facility] ACUTE REHAB  Daily Treatment Note  NAME: Dufm Cheadle  : 1960  MRN: [id number]    Date of Service: 2021    Discharge Recommendations:  Patient would benefit from continued therapy after discharge   PT Equipment Recommendations  Equipment Needed: Yes  Mobility Devices: Jayden Simpers: Rolling  Other: Need for RW TBD, pt has a 4WW but may require a more stable AD upon d/c    Assessment   Body structures, Functions, Activity limitations: Decreased functional mobility ; Decreased ROM; Decreased strength;Decreased endurance;Decreased balance; Increased pain  Treatment Diagnosis: impaired functional mobility secondary to Bilateral TKA procedure  Specific instructions for Next Treatment: transfer pt to sitting up in comfortable chair, seated ROM exercises for Bilateral knees. History: H/O HTN, Sleep apnea, current smoker, depression/anxiety. REQUIRES PT FOLLOW UP: Yes  Activity Tolerance  Activity Tolerance: Other (nausea)     Patient Diagnosis(es): There were no encounter diagnoses. has a past medical history of Anxiety, Arthritis, Depression, Hypertension, Knee pain, Murmur, PONV (postoperative nausea and vomiting), and Sleep apnea. has a past surgical history that includes Rotator cuff repair (Left); tumor removal; Ulnar tunnel release (Right); skin biopsy; eye surgery (Left, 2018); and knee surgery (Bilateral, 2021). Restrictions  Restrictions/Precautions  Restrictions/Precautions: Weight Bearing, Surgical Protocols, Fall Risk  Required Braces or Orthoses?: No  Implants present? : Metal implants (B TKA, metal plate and screws in RUE)  Lower Extremity Weight Bearing Restrictions  Right Lower Extremity Weight Bearing: Weight Bearing As Tolerated  Left Lower Extremity Weight Bearing: Weight Bearing As Tolerated  Position Activity Restriction  Other position/activity restrictions: FWBAT RLE/LLE  Subjective   General  Chart Reviewed:  Yes  Additional Pertinent Hx: pt is a 64 y.o. woman recently admitted to hospital for Bilateral TKA procedure on 9/21/21 by Dr. Marina Dodson. Pt with initial diagnosis of Bilateral knee Degenerative Joint Disease and H/O osteoarthritis, sleep apnea (no machine), and HTN. Ad[facility] to [facility] on 9/24/21  Missed reason: Patient declined (PM session d/t nausea and pain)  Response To Previous Treatment: Patient with no complaints from previous session. Family / Caregiver Present: No  Referring Practitioner: Dr. Fernando Gomez: Patient continues to be nauseated; agreeable to therapy   Pain Screening  Patient Currently in Pain: Yes  Pain Assessment  Pain Assessment: 0-10  Pain Level: 3  Pain Type: Acute pain;Surgical pain  Pain Location: Leg;Knee  Pain Orientation: Right;Left  Vital Signs  Patient Currently in Pain: Yes       Orientation  Orientation  Overall Orientation Status: Within Functional Limits  Objective   Bed mobility  Sit to Supine: Moderate assistance (assist with B LEs)  Transfers  Sit to Stand: Contact guard assistance  Stand to sit: Contact guard assistance  Bed to Chair: Contact guard assistance  Stand Pivot Transfers: Contact guard assistance  Comment: slow guarded movements, Pt report increased stiffness. Ambulation  Ambulation?: Yes  Ambulation 1  Surface: level tile  Device: Rolling Walker  Assistance: Contact guard assistance  Quality of Gait: Very slow & antalgic gait, Wide LUX, pt moves slowly/cautiously with dec bilateral step height and length. Heavy BUEs WB on RW  Gait Deviations: Slow Janet; Increased LUX; Decreased step length;Decreased step height  Distance: few steps taken in room from w/c to EOB using RW. 2-3ft total  Comments: Nauseated and requesting to lye back down. Stairs/Curb  Stairs?: No        Other exercises  Other exercises?: Yes  Other exercises 1: seated B LE exercises x20 reps with orange tband   Other exercises 2: standing tolerance x1 minute x2 reps.  PAtient reporting and OT  Times per day: Twice a day  Plan weeks: 1-2 weeks  Specific instructions for Next Treatment: transfer pt to sitting up in comfortable chair, seated ROM exercises for Bilateral knees. Current Treatment Recommendations: Strengthening, ROM, Balance Training, Functional Mobility Training, Transfer Training, Endurance Training, Gait Training, Stair training, Pain Management, Home Exercise Program, Safety Education & Training, Patient/Caregiver Education & Training, Equipment Evaluation, Education, & procurement  Safety Devices  Type of devices:  All fall risk precautions in place, Call light within reach, Left in bed  Restraints  Initially in place: No        09/28/21 1125   PT Individual Minutes   Time In 1027   Time Out 1123   Minutes 56   Minute Variance   Variance 34   Reason Refusal  (d/t nausea and increased pain)     Electronically signed by Ananya Kelly PTA on 9/28/21 at 2:41 PM EDT

## 2021-09-28 NOTE — PLAN OF CARE
Problem: Falls - Risk of:  Goal: Will remain free from falls  Description: Will remain free from falls  9/28/2021 0300 by Vincenzo Carrel, RN  Outcome: Ongoing     Problem: Falls - Risk of:  Goal: Absence of physical injury  Description: Absence of physical injury  9/28/2021 0300 by Vincenzo Carrel, RN  Outcome: Ongoing     Problem: Skin Integrity:  Goal: Will show no infection signs and symptoms  Description: Will show no infection signs and symptoms  9/28/2021 0300 by Vincenzo Carrel, RN  Outcome: Ongoing     Problem: Skin Integrity:  Goal: Absence of new skin breakdown  Description: Absence of new skin breakdown  9/28/2021 0300 by Vincenzo Carrel, RN  Outcome: Ongoing     Problem: Pain:  Goal: Pain level will decrease  Description: Pain level will decrease  9/28/2021 0300 by Vincenzo Carrel, RN  Outcome: Ongoing     Problem: Pain:  Goal: Control of acute pain  Description: Control of acute pain  9/28/2021 0300 by Vincenzo Carrel, RN  Outcome: Ongoing     Problem: Pain:  Goal: Control of chronic pain  Description: Control of chronic pain  9/28/2021 0300 by Vincenzo Carrel, RN  Outcome: Ongoing     Problem: Musculor/Skeletal Functional Status  Goal: Highest potential functional level  9/28/2021 0300 by Vincenzo Carrel, RN  Outcome: Ongoing     Problem: Musculor/Skeletal Functional Status  Goal: Absence of falls  9/28/2021 0300 by Vincenzo Carrel, RN  Outcome: Ongoing     Problem: Nutrition  Goal: Optimal nutrition therapy  9/28/2021 0300 by Vincenzo Carrel, RN  Outcome: Ongoing

## 2021-09-28 NOTE — PROGRESS NOTES
[address] [facility]    ACUTE REHABILITATION OCCUPATIONAL THERAPY  DAILY NOTE    Date: 21  Patient Name: Cookie Botello      Room: 9193/5571-33    MRN: [id number]   : 1960  (64 y.o.)  Gender: female   Referring Practitioner: Cielo Jarrett MD/Dr. Jeffry Carpenter  Diagnosis: B TKA 21       Restrictions  Restrictions/Precautions: Weight Bearing, Surgical Protocols, Fall Risk  Implants present? : Metal implants (B TKA, metal plate and screws in RUE)  Other position/activity restrictions: FWBAT RLE/LLE  Right Lower Extremity Weight Bearing: Weight Bearing As Tolerated  Left Lower Extremity Weight Bearing: Weight Bearing As Tolerated  Required Braces or Orthoses?: No  Equipment Used: Bed, Wheelchair    Subjective  Subjective: \"No one worth any help\" Pt states regarding support at home. Comments: Pt pleasant and cooperative. Increased time for all tasks due to need rest breaks secondary to fatigue and intermittent lightheadedness. Patient Currently in Pain: Denies  Restrictions/Precautions: Weight Bearing;Surgical Protocols; Fall Risk  Overall Orientation Status: Within Functional Limits        Objective  Cognition  Overall Cognitive Status: WFL  Perception  Overall Perceptual Status: WFL  Balance  Sitting Balance: Supervision (seated EOB, seated on toilet, mod I in w/c. )  Standing Balance: Contact guard assistance  Bed mobility  Supine to Sit: Minimal assistance (Assist to advance RLE)  Scooting: Contact guard assistance;Stand by assistance (at EOB)  Comment: Head of bed elevated, max use of bed rail, increased time.    Transfers  Sit to stand: Contact guard assistance  Stand to sit: Contact guard assistance  Standing Balance  Time: AM: 1-2 min x 2  Activity: AM: functional mobility/transfers, self care tasks  Comment: 1-2 UE support, briefly able to stand unsupported during toileting tasks  Functional Mobility  Functional - Mobility Device: Rolling Walker  Activity: To/from bathroom  Assist Level: Contact guard assistance  Functional Mobility Comments: Good safety, no LOB, slow pace  Toilet Transfers  Toilet - Technique: Ambulating  Equipment Used: Raised toilet seat with rails  Toilet Transfer: Contact guard assistance                             ADL  Equipment Provided: Reacher;Sock aid  Feeding: Modified independent  (dentures, limited appetite)  Grooming: Setup (seated sinkside. )  UE Bathing: None (pt declines)  LE Bathing: None (pt declines)  UE Dressing: None (pt declines; requesting to keep same dress on. )  LE Dressing: Stand by assistance;Minimal assistance (donned/doffed socks w/ SBA & AE, A w/ TEDs, no pants today. )  Toileting: Contact guard assistance (managed dress, pull ups down/up, hygiene post void)  Additional Comments: Pt continues to require encouragment, education and increased time regarding participation in ADL's. Pt with limited outfits availabe, requesting to remain in same dress for ease today. Assessment  Performance deficits / Impairments: Decreased functional mobility ; Decreased ADL status; Decreased ROM; Decreased strength;Decreased safe awareness;Decreased endurance;Decreased balance;Decreased high-level IADLs;Decreased coordination  Prognosis: Good  Discharge Recommendations: Home with assist PRN  Activity Tolerance: Patient limited by fatigue;Patient limited by pain;Treatment limited secondary to medical complications (free text)  Safety Devices in place: Yes  Type of devices: Patient at risk for falls;Gait belt;Left in chair;Call light within reach  Equipment Recommendations  Equipment Needed:  (TBD)        Plan  Plan  Times per week: 900/7  Times per day: Twice a day  Current Treatment Recommendations: Strengthening, ROM, Balance Training, Functional Mobility Training, Endurance Training, Pain Management, Safety Education & Training, Patient/Caregiver Education & Training, Equipment Evaluation, Education, & procurement, Self-Care / ADL, Home Management Training  Patient Goals   Patient goals : To return to independence  Short term goals  Time Frame for Short term goals: One Week  Short term goal 1: Pt will complete UB bathing/dressing at Mod I level. Short term goal 2: Pt will complete LB bathing/dressing with Mod A and Good safety using AE as needed. Short term goal 3: Pt will complete toilet transfer and toileting tasks with Min A and Good safety using least restrictive device. Short term goal 4: Pt will tolerate standing for 5+ minutes with 1-2 UE support, SBA, and no LOB while completing a functional task. Short term goal 5: Pt will actively participate in 30 minutes of therapeutic exercise/activity to promote increased independence and safety with self-care and mobility. Long term goals  Time Frame for Long term goals : By Discharge  Long term goal 1: Pt will complete BADL's with Mod I and Good safety using AE as needed. Long term goal 2: Pt will complete functional transfers with Mod I and Good safety using least restrictive device. Long term goal 3: Pt will tolerate standing for 10 minutes with 1-2 UE support and no LOB while completing a functional task. Long term goal 4: Pt will complete light housekeeping/simple meal prep with SBA and Good safety using least restrictive device. Long term goal 5: Pt will V/D at least 3 non-pharmacological pain mgt techniques and 3 EC/WS strategies that she can utilize during home routines.         09/28/21 1021 09/28/21 1531   OT Individual Minutes   Time In 4916  --    Time Out 1015  --    Minutes 46  --    Minute Variance   Variance  --  44   Reason  --  Refusal  (Pt reports nausea)     Electronically signed by NICOLÁS Uribe on 9/28/21 at 3:33 PM EDT

## 2021-09-28 NOTE — PROGRESS NOTES
Physical Medicine & Rehabilitation  Progress Note    9/28/2021 12:50 PM     CC: Ambulatory and ADL dysfunction due to bilateral total knee arthroplasty    Subjective:   No some nausea, vomiting in the morning. Notes she takes all her meds then. Last BM 2 days ago. Refused NicoDerm patch. Has chronic incontinence. ROS:  Denies fevers, chills, sweats. No chest pain, palpitations, lightheadedness. Denies coughing, wheezing or shortness of breath. Denies abdominal pain, nausea, diarrhea or constipation. No new areas of joint pain. Denies new areas of numbness or weakness. Denies new anxiety or depression issues. No new skin problems. Rehabilitation:   PT:  Restrictions/Precautions: Weight Bearing, Surgical Protocols, Fall Risk  Implants present? : Metal implants (B TKA, metal plate and screws in RUE)  Other position/activity restrictions: FWBAT RLE/LLE  Right Lower Extremity Weight Bearing: Weight Bearing As Tolerated  Left Lower Extremity Weight Bearing: Weight Bearing As Tolerated   Transfers  Sit to Stand: Contact guard assistance  Stand to sit: Contact guard assistance  Bed to Chair: Contact guard assistance  Stand Pivot Transfers: Contact guard assistance  Comment: slow guarded movements, Pt report increased stiffness. WB Status: FWBAT BLEs  Ambulation 1  Surface: level tile  Device: Rolling Walker  Other Apparatus: O2 (2)  Assistance: Contact guard assistance  Quality of Gait: Very slow & antalgic gait, Wide LUX, pt moves slowly/cautiously with dec bilateral step height and length. Heavy BUEs WB on RW  Gait Deviations: Slow Janet, Increased LUX, Decreased step length, Decreased step height  Distance: few steps taken in room from w/c to EOB using RW. 2-3ft total  Comments: Nauseated and requesting to lye back down.           OT:  ADL  Equipment Provided: Reacher, Sock aid  Feeding: Modified independent  (dentures, limited appetite)  Grooming: Setup (seated sinkside. )  UE Bathing: None (pt declines)  LE Bathing: None (pt declines)  UE Dressing: None (pt declines; requesting to keep same dress on. )  LE Dressing: Stand by assistance, Minimal assistance (donned/doffed socks w/ SBA & AE, A w/ TEDs, no pants today. )  Toileting: Contact guard assistance (managed dress, pull ups down/up, hygiene post void)  Additional Comments: Pt continues to require encouragment, education and increased time regarding participation in ADL's. Pt with limited outfits availabe, requesting to remain in same dress for ease today. Balance  Sitting Balance: Supervision (seated EOB, seated on toilet, mod I in w/c. )  Standing Balance: Contact guard assistance   Standing Balance  Time: AM: 1-2 min x 2  Activity: AM: functional mobility/transfers, self care tasks  Comment: 1-2 UE support, briefly able to stand unsupported during toileting tasks  Functional Mobility  Functional - Mobility Device: Rolling Walker  Activity: To/from bathroom  Assist Level: Contact guard assistance  Functional Mobility Comments: Good safety, no LOB, slow pace     Bed mobility  Supine to Sit: Minimal assistance (Assist to advance RLE)  Scooting: Contact guard assistance, Stand by assistance (at EOB)  Comment: Head of bed elevated, max use of bed rail, increased time.    Transfers  Stand Step Transfers: Contact guard assistance  Stand Pivot Transfers: Contact guard assistance  Sit to stand: Contact guard assistance  Stand to sit: Contact guard assistance  Transfer Comments: Sit>stand improved to CGA in PM   Toilet Transfers  Toilet - Technique: Ambulating  Equipment Used: Raised toilet seat with rails  Toilet Transfer: Contact guard assistance  Toilet Transfers Comments: CGA for all aspects of transfer except sit>stand, requiring Min A in AM (improved to CGA in PM)        Wheelchair Bed Transfers  Wheelchair/Bed - Technique: Stand pivot  Equipment Used: Bed, Wheelchair  Level of Asssistance: Minimal assistance  Wheelchair Transfers Comments: Min A to stand from EOB, CGA for pivot with RW      ST:            Objective:  /85   Pulse 76   Temp 98.5 °F (36.9 °C) (Oral)   Resp 16   Ht 5' 2\" (1.575 m)   Wt 252 lb (114.3 kg)   SpO2 91%   BMI 46.09 kg/m²  I Body mass index is 46.09 kg/m². I   Wt Readings from Last 1 Encounters:   21 252 lb (114.3 kg)      Temp (24hrs), Av.5 °F (36.9 °C), Min:98.4 °F (36.9 °C), Max:98.7 °F (37.1 °C)         GEN: well developed, well nourished, no acute distress  HEENT: Normocephalic atraumatic, EOMI, mucous membranes pink and moist  CV: RRR, no murmurs, rubs or gallops  PULM: CTAB, no rales or rhonchi. Respirations WNL and unlabored  ABD: soft, NT, ND, +BS and equal, no guarding or rebound  NEURO: A&O x3. Sensation intact to light touch. MSK: 4+/5 upper extremity distal lower extremities, knee incisions with Aquacel mild drainage, no erythema erythema around dressing  EXTREMITIES: No calf tenderness to palpation bilaterally. 1+ edema lower extremities  SKIN: warm dry and intact with good turgor  PSYCH: appropriately interactive. Affect WNL. Good spirits        Medications   Scheduled Meds:   aspirin  81 mg Oral BID    sennosides-docusate sodium  1 tablet Oral BID    amLODIPine  10 mg Oral Daily    ipratropium-albuterol  1 ampule Inhalation 4x daily    lisinopril  20 mg Oral Daily    polyethylene glycol  17 g Oral Daily     Continuous Infusions:  PRN Meds:.ondansetron, calcium carbonate, magnesium hydroxide, oxyCODONE **OR** oxyCODONE, acetaminophen, senna, bisacodyl     Diagnostics:     CBC:   No results for input(s): WBC, RBC, HGB, HCT, MCV, RDW, PLT in the last 72 hours. BMP:   No results for input(s): NA, K, CL, CO2, PHOS, BUN, CREATININE in the last 72 hours. Invalid input(s): CA  BNP: No results for input(s): BNP in the last 72 hours. PT/INR: No results for input(s): PROTIME, INR in the last 72 hours. APTT: No results for input(s): APTT in the last 72 hours.   CARDIAC ENZYMES: No results for input(s): CKMB, CKMBINDEX, TROPONINT in the last 72 hours. Invalid input(s): CKTOTAL;3  FASTING LIPID PANEL:No results found for: CHOL, HDL, TRIG  LIVER PROFILE: No results for input(s): AST, ALT, ALB, BILIDIR, BILITOT, ALKPHOS in the last 72 hours. I/O (24Hr): No intake or output data in the 24 hours ending 09/28/21 1250    Glu last 24 hour  No results for input(s): POCGLU in the last 72 hours. No results for input(s): CLARITYU, COLORU, PHUR, SPECGRAV, PROTEINU, RBCUA, BLOODU, BACTERIA, NITRU, WBCUA, LEUKOCYTESUR, YEAST, Ester Boast in the last 72 hours.         Impression/Plan:   Impaired ADLs, gait, and mobility due to:     1. Bilateral knee osteoarthritis s/p bilateral total knee arthroplasty:  Procedure done 9/21/21.  WBAT to the bilateral lower limbs.  Continue rehab efforts, Follow up with Dr. Power Jaime as outpatient, team comes reviewed, discharge plan 10/5  2. Pain-tylenol and oxycodone as needed.  . Using 10 mg of oxycodone-discussed with patient to begin tapering-agreeable to decrease to every 6 hours-recommend try to decrease to 5  3. Chronic incontinence of urine-check PVR, discussed with nursing  4. Nausea/vomiting a.m.-add Zofran, questionable secondary to pain meds-decrease as above, reviewed consideration of NicoDerm patch -patient does not want  5. Bilateral hand cramps:  Patient reports that she has had these intermittently in the past.  Electrolytes WNL, including magnesium. Heat/ice as needed. Will DC lidocaine cream to hands, not using, monitor  6. HTN:  On amlodipine, lisinopril  7. Clinical COPD:  Per pulmonology.  On duo-neb 4 times daily-questionable change to aerosol.  Will need PFTs as outpatient.,  Will need home O2 evaluation  8. KEVIN:  Did not tolerate nocturnal bipap.  Continue nasal cannula oxygen at night (at least 2-3 L to keep O2 sat > 88%).  Will need sleep study as outpatient. 9. Depression, anxiety:  Not currently on medication  10.  Morbid obesity:  BMI 46.09  11. Bowel Management: Miralax daily, senokot prn, dulcolax prn, milk of magnesia prn. Last BM 9/26  12. DVT Prophylaxis:  aspirin BID  13. Internal Medicine for medical management    Lina Parker. Tarun Giraldo MD       This note is created with the assistance of a speech recognition program.  While intending to generate a document that actually reflects the content of the visit, the document can still have some errors including those of syntax and sound a like substitutions which may escape proof reading.   In such instances, actual meaning can be extrapolated by contextual diversion

## 2021-09-28 NOTE — CONSULTS
[facility] Internal Medicine    CONSULTATION    / FOLLOW UP VISIT       Date:   9/28/2021  Patient name:  Davian Davison  Date of admission:  9/24/2021  3:48 PM  MRN:   [id number]  Account:  [de-identified]  YOB: 1960  PCP:    Dominga Estevez  Room:   2608/2608-01  Code Status:    Full Code    Physician Requesting Consult: Alison Neal MD    History of Present Illness:      C/C ;  Medical comorbidity management     REASON FOR CONSULT;  Medical comorbidity and medication management ;                                                 *Principal Problem:    S/P total knee arthroplasty, bilateral  Active Problems: Morbid obesity ([facility] [address]    History of tobacco use    COPD (chronic obstructive pulmonary disease) ([facility] [address]    Aftercare following bilateral knee joint replacement surgery  Resolved Problems:    * No resolved hospital problems. *           HPI;    S/p bilateral knee arthroplasty   Known to have obstructive sleep apnea   Did have acute exacerbation of COPD  Was seen by Dr. Nba Renae hypertensive  Patient is complaining of nausea and also have constipation. Denies vomiting, abdominal pain , urinary symptoms  Vitals:    09/27/21 1839 09/27/21 1944 09/28/21 0644 09/28/21 0830   BP: (!) 150/69 (!) 143/56  137/85   Pulse: 70 87  76   Resp: 18 16 14 14   Temp: 98.4 °F (36.9 °C) 98.7 °F (37.1 °C)  98.5 °F (36.9 °C)   TempSrc: Oral   Oral   SpO2: 90% 93% (!) 87%    Weight:       Height:          The patient is a 64y.o.-year-old with ADL and mobility deficits secondary to knee osteoarthritis s/p bilateral total knee arthroplasty. She will require close medical monitoring for the comorbidities listed below. She will benefit from intensive interdisciplinary therapies and rehab nursing care and is appropriate for inpatient rehabilitation. The post admission physician evaluation (STEPHANIE) is consistent with the pre-admission assessment.   See above findings to reflect the elements required in the STEPHNAIE. Patient's admitting condition is consistent with the findings of the preadmission assessment by the rehabilitation admissions coordinator. Diagnoses/plan:     1. Bilateral knee osteoarthritis s/p bilateral total knee arthroplasty:  Procedure done 9/21/21. WBAT to the bilateral lower limbs. PT/OT for gait, mobility, strengthening, endurance, ADLs, and self care. Pain control with tylenol and oxycodone as needed. Follow up with Dr. Abel Weston as outpatient. 2. Bilateral hand cramps:  Patient reports that she has had these intermittently in the past.  Electrolytes WNL. Magnesium is normal.  Heat/ice as needed. Added lidocaine cream as needed for pain. 3. HTN:  On amlodipine, lisinopril  4. COPD:  Per pulmonology. On duo-neb 4 times daily. Will need PFTs as outpatient. 5. KEVIN:  Did not tolerate nocturnal bipap. Continue nasal cannula oxygen at night (at least 2-3 L to keep O2 sat > 88%). Will need sleep study as outpatient. 6. Depression, anxiety:  Not currently on medication  7. Morbid obesity:  BMI 46.09  8. Bowel Management: Miralax daily, senokot prn, dulcolax prn, milk of magnesia prn.  9. DVT Prophylaxis:  aspirin BID  10. Internal Medicine for medical management          Past and Surgical hx as in H and P  Social History:     Tobacco:    reports that she has been smoking. She has been smoking about 1.00 pack per day. She has never used smokeless tobacco.  Alcohol:      reports previous alcohol use. Drug Use:  reports previous drug use. Review of Systems:     POSITIVE AND NEGATIVES AS DESCRIBED IN HISTORY OF PRESENT ILLNESS ;  IN ADDITION ;  Review of Systems   Constitutional: Positive for activity change and appetite change. Negative for chills, diaphoresis, fatigue, fever and unexpected weight change. HENT: Negative for congestion, sore throat, trouble swallowing and voice change.     Eyes: Negative for photophobia, discharge, itching and visual disturbance. Respiratory: Positive for cough and shortness of breath. Negative for apnea, choking, chest tightness, wheezing and stridor. Cardiovascular: Positive for leg swelling. Negative for chest pain and palpitations. Gastrointestinal: Positive for constipation and nausea. Negative for abdominal distention and vomiting. Endocrine: Negative for cold intolerance and heat intolerance. Genitourinary: Negative for difficulty urinating and dysuria. Musculoskeletal: Positive for gait problem. Negative for arthralgias and back pain. Neurological: Negative for dizziness, tremors, seizures, syncope, facial asymmetry, speech difficulty, light-headedness, numbness and headaches. Hematological: Negative for adenopathy. Psychiatric/Behavioral: Negative for agitation, behavioral problems, confusion, decreased concentration, dysphoric mood, hallucinations, self-injury, sleep disturbance and suicidal ideas. The patient is not nervous/anxious. All other systems negative                Physical Exam:     Physical Exam   Vitals:    09/27/21 1839 09/27/21 1944 09/28/21 0644 09/28/21 0830   BP: (!) 150/69 (!) 143/56  137/85   Pulse: 70 87  76   Resp: 18 16 14 14   Temp: 98.4 °F (36.9 °C) 98.7 °F (37.1 °C)  98.5 °F (36.9 °C)   TempSrc: Oral   Oral   SpO2: 90% 93% (!) 87%    Weight:       Height:                       Body mass index is 46.09 kg/m². General Appearance:   -, CO-OPERATIVE ,                                                        Pulmonary/Chest:        Clear to auscultation bilaterally . No wheezes, rales or rhonchi . Cardiovascular:            Normal rate, regular rhythm,                                          No murmur or  Gallop . Abdomen:                       Soft, non-tender                                                                                    Extremities:                    No Edema . Neuromuskuloskeletal    ... Data:     URINE ANALYSIS: No results found for: LABURIN     CBC:  Lab Results   Component Value Date    WBC 9.4 09/25/2021    HGB 12.4 09/25/2021     09/25/2021        BMP:    Lab Results   Component Value Date     09/25/2021    K 4.6 09/25/2021     09/25/2021    CO2 29 09/25/2021    BUN 12 09/25/2021    CREATININE 0.53 09/25/2021    GLUCOSE 139 09/25/2021      LIVER PROFILE:No results found for: ALT, AST, PROT, BILITOT, BILIDIR, LABALBU          Radiology:         Medications: Allergies:  No Known Allergies    Current Meds:   Scheduled Meds:    aspirin  81 mg Oral BID    sennosides-docusate sodium  1 tablet Oral BID    amLODIPine  10 mg Oral Daily    ipratropium-albuterol  1 ampule Inhalation 4x daily    lisinopril  20 mg Oral Daily    polyethylene glycol  17 g Oral Daily     Continuous Infusions:   PRN Meds: ondansetron, calcium carbonate, magnesium hydroxide, oxyCODONE **OR** oxyCODONE, acetaminophen, senna, bisacodyl        Assessment :       Assessment Dx  Principal Problem:    S/P total knee arthroplasty, bilateral  Active Problems: Morbid obesity ([facility] [address]    History of tobacco use    COPD (chronic obstructive pulmonary disease) ([facility] [address]    Aftercare following bilateral knee joint replacement surgery  Resolved Problems:    * No resolved hospital problems.  *              Plan:     Patient s/p bilateral knee arthroplasty   Has advanced COPD obstructive sleep apnea morbid obesity         9/28/2021    Bilateral knee osteoarthritis s/p arthroplasty:  -Continue physical therapy  -Analgesics for pain control    Hypertension:  -Continue amlodipine 10 mg daily and lisinopril 20 mg daily  -Blood pressure is optimally controlled    COPD, not currently in exacerbation:  -Continue DuoNeb nebulizations 4 times daily  -BiPAP    Constipation:  -Continue Senokot 1 tablet 2 times daily, GlycoLax 17 g orally daily  -If no bowel movement by tomorrow, will Problems:    * No resolved hospital problems. *         ---- ;     MD NEREYDA Pruett[address], [address] [place] [address].    Phone [phone]   Fax: [phone]  Answering Service: [phone]

## 2021-09-28 NOTE — PROGRESS NOTES
BRONCHOSPASM/BRONCHOCONSTRICTION     [x]         IMPROVE AERATION/BREATH SOUNDS  [x]   ADMINISTER BRONCHODILATOR THERAPY AS APPROPRIATE  [x]   ASSESS BREATH SOUNDS  []   IMPLEMENT AEROSOL/MDI PROTOCOL  [x]   PATIENT EDUCATION AS NEEDED     PATIENT REFUSES TO WEAR BIPAP     [x] Risks and benefits explained to patient   [x] Patient refuses to wear Bipap stating \"I don't wear that machine. \"  [x] Patient verbalizes understanding of information presented.

## 2021-09-29 PROCEDURE — 99232 SBSQ HOSP IP/OBS MODERATE 35: CPT | Performed by: PHYSICAL MEDICINE & REHABILITATION

## 2021-09-29 PROCEDURE — 97110 THERAPEUTIC EXERCISES: CPT

## 2021-09-29 PROCEDURE — 6370000000 HC RX 637 (ALT 250 FOR IP): Performed by: NURSE PRACTITIONER

## 2021-09-29 PROCEDURE — 6370000000 HC RX 637 (ALT 250 FOR IP): Performed by: STUDENT IN AN ORGANIZED HEALTH CARE EDUCATION/TRAINING PROGRAM

## 2021-09-29 PROCEDURE — 97530 THERAPEUTIC ACTIVITIES: CPT

## 2021-09-29 PROCEDURE — 6370000000 HC RX 637 (ALT 250 FOR IP): Performed by: ORTHOPAEDIC SURGERY

## 2021-09-29 PROCEDURE — 97116 GAIT TRAINING THERAPY: CPT

## 2021-09-29 PROCEDURE — 6370000000 HC RX 637 (ALT 250 FOR IP): Performed by: PHYSICAL MEDICINE & REHABILITATION

## 2021-09-29 PROCEDURE — 97535 SELF CARE MNGMENT TRAINING: CPT

## 2021-09-29 PROCEDURE — 94761 N-INVAS EAR/PLS OXIMETRY MLT: CPT

## 2021-09-29 PROCEDURE — 1180000000 HC REHAB R&B

## 2021-09-29 PROCEDURE — 99231 SBSQ HOSP IP/OBS SF/LOW 25: CPT | Performed by: INTERNAL MEDICINE

## 2021-09-29 PROCEDURE — 94640 AIRWAY INHALATION TREATMENT: CPT

## 2021-09-29 PROCEDURE — 6370000000 HC RX 637 (ALT 250 FOR IP): Performed by: INTERNAL MEDICINE

## 2021-09-29 RX ORDER — MAGNESIUM HYDROXIDE/ALUMINUM HYDROXICE/SIMETHICONE 120; 1200; 1200 MG/30ML; MG/30ML; MG/30ML
30 SUSPENSION ORAL EVERY 6 HOURS PRN
Status: DISCONTINUED | OUTPATIENT
Start: 2021-09-29 | End: 2021-10-05 | Stop reason: HOSPADM

## 2021-09-29 RX ADMIN — OXYCODONE HYDROCHLORIDE 10 MG: 10 TABLET ORAL at 13:08

## 2021-09-29 RX ADMIN — OXYCODONE HYDROCHLORIDE 10 MG: 10 TABLET ORAL at 03:41

## 2021-09-29 RX ADMIN — IPRATROPIUM BROMIDE AND ALBUTEROL SULFATE 1 AMPULE: .5; 3 SOLUTION RESPIRATORY (INHALATION) at 19:52

## 2021-09-29 RX ADMIN — ASPIRIN 81 MG: 81 TABLET, COATED ORAL at 07:37

## 2021-09-29 RX ADMIN — BISACODYL 10 MG: 10 SUPPOSITORY RECTAL at 22:11

## 2021-09-29 RX ADMIN — ANTACID TABLETS 500 MG: 500 TABLET, CHEWABLE ORAL at 13:08

## 2021-09-29 RX ADMIN — ACETAMINOPHEN 650 MG: 325 TABLET ORAL at 03:42

## 2021-09-29 RX ADMIN — AMLODIPINE BESYLATE 10 MG: 10 TABLET ORAL at 07:37

## 2021-09-29 RX ADMIN — ASPIRIN 81 MG: 81 TABLET, COATED ORAL at 22:11

## 2021-09-29 RX ADMIN — ANTACID TABLETS 500 MG: 500 TABLET, CHEWABLE ORAL at 17:03

## 2021-09-29 RX ADMIN — IPRATROPIUM BROMIDE AND ALBUTEROL SULFATE 1 AMPULE: .5; 3 SOLUTION RESPIRATORY (INHALATION) at 11:15

## 2021-09-29 RX ADMIN — ALUMINUM HYDROXIDE, MAGNESIUM HYDROXIDE, AND SIMETHICONE 30 ML: 200; 200; 20 SUSPENSION ORAL at 22:11

## 2021-09-29 RX ADMIN — POLYETHYLENE GLYCOL 3350 17 G: 17 POWDER, FOR SOLUTION ORAL at 07:36

## 2021-09-29 RX ADMIN — IPRATROPIUM BROMIDE AND ALBUTEROL SULFATE 1 AMPULE: .5; 3 SOLUTION RESPIRATORY (INHALATION) at 06:48

## 2021-09-29 RX ADMIN — LISINOPRIL 20 MG: 20 TABLET ORAL at 07:36

## 2021-09-29 RX ADMIN — STANDARDIZED SENNA CONCENTRATE 17.2 MG: 8.6 TABLET ORAL at 09:40

## 2021-09-29 RX ADMIN — DOCUSATE SODIUM 50MG AND SENNOSIDES 8.6MG 1 TABLET: 8.6; 5 TABLET, FILM COATED ORAL at 22:11

## 2021-09-29 RX ADMIN — OXYCODONE HYDROCHLORIDE 10 MG: 10 TABLET ORAL at 23:20

## 2021-09-29 RX ADMIN — ONDANSETRON 4 MG: 4 TABLET, ORALLY DISINTEGRATING ORAL at 09:40

## 2021-09-29 RX ADMIN — DOCUSATE SODIUM 50MG AND SENNOSIDES 8.6MG 1 TABLET: 8.6; 5 TABLET, FILM COATED ORAL at 07:37

## 2021-09-29 ASSESSMENT — PAIN DESCRIPTION - DESCRIPTORS
DESCRIPTORS: ACHING
DESCRIPTORS: ACHING;BURNING;SHARP;SHOOTING

## 2021-09-29 ASSESSMENT — PAIN SCALES - GENERAL
PAINLEVEL_OUTOF10: 8
PAINLEVEL_OUTOF10: 7
PAINLEVEL_OUTOF10: 5
PAINLEVEL_OUTOF10: 7
PAINLEVEL_OUTOF10: 3
PAINLEVEL_OUTOF10: 5
PAINLEVEL_OUTOF10: 7

## 2021-09-29 ASSESSMENT — PAIN DESCRIPTION - FREQUENCY
FREQUENCY: CONTINUOUS
FREQUENCY: CONTINUOUS

## 2021-09-29 ASSESSMENT — PAIN DESCRIPTION - ONSET: ONSET: AWAKENED FROM SLEEP

## 2021-09-29 ASSESSMENT — PAIN DESCRIPTION - LOCATION
LOCATION: KNEE

## 2021-09-29 ASSESSMENT — PAIN DESCRIPTION - ORIENTATION
ORIENTATION: RIGHT;LEFT

## 2021-09-29 ASSESSMENT — PAIN DESCRIPTION - PAIN TYPE
TYPE: ACUTE PAIN;SURGICAL PAIN
TYPE: ACUTE PAIN;SURGICAL PAIN

## 2021-09-29 ASSESSMENT — PAIN DESCRIPTION - PROGRESSION: CLINICAL_PROGRESSION: NOT CHANGED

## 2021-09-29 ASSESSMENT — PAIN - FUNCTIONAL ASSESSMENT: PAIN_FUNCTIONAL_ASSESSMENT: ACTIVITIES ARE NOT PREVENTED

## 2021-09-29 NOTE — PROGRESS NOTES
Comprehensive Nutrition Assessment    Type and Reason for Visit:  Reassess    Nutrition Recommendations/Plan:   1. Continue current diet. 2. Start House Supplements chocolate 2x daily. Nutrition Assessment:  Patient reports poor appetite due to nausea and constpation. Patient said she getting medication for nausea but it's not helping. Noted on bowel management program. Observed lunch untouched on bedside tray table. Patient refused offer of oral supplements but agreed to offer of house shakes 2x daily. Malnutrition Assessment:  Malnutrition Status:  No malnutrition    Context:  Acute Illness     Findings of the 6 clinical characteristics of malnutrition:  Energy Intake:  Mild decrease in energy intake (Comment)  Weight Loss:  No significant weight loss     Body Fat Loss:  No significant body fat loss     Muscle Mass Loss:  No significant muscle mass loss    Fluid Accumulation:  No significant fluid accumulation     Strength:  Not Performed    Estimated Daily Nutrient Needs:  Energy (kcal):  1715 kcals based on 15 kcals/kg using adm wt 114.3 kg; Weight Used for Energy Requirements:  Admission     Protein (g):  100 gm protein based on 2 gm/kg using IBW; Weight Used for Protein Requirements:  Ideal          Nutrition Related Findings:  Edema: +1 pitting RLE, LLE. Bowel sounds active. Last BM 9/26. Labs and meds reviewed. Wounds:  Surgical Incision       Current Nutrition Therapies:    ADULT DIET;  Regular    Anthropometric Measures:  · Height: 5' 2\" (157.5 cm)  · Current Body Weight: 252 lb (114.3 kg)   · Admission Body Weight: 252 lb (114.3 kg)    · Usual Body Weight:       · Ideal Body Weight: 110 lbs; % Ideal Body Weight 229.1 %   · BMI: 46.1  · Adjusted Body Weight:  ; No Adjustment   · BMI Categories: Obese Class 3 (BMI 40.0 or greater)       Nutrition Diagnosis:   · Inadequate oral intake related to altered GI function as evidenced by nausea, intake 0-25%    Nutrition Interventions:   Food and/or Nutrient Delivery:  Continue Current Diet, Start Oral Nutrition Supplement  Nutrition Education/Counseling:  No recommendation at this time   Coordination of Nutrition Care:  Continue to monitor while inpatient    Goals:  PO intake % of most meals. Nutrition Monitoring and Evaluation:   Food/Nutrient Intake Outcomes:  Food and Nutrient Intake, Supplement Intake  Physical Signs/Symptoms Outcomes:  Biochemical Data, GI Status, Nausea or Vomiting, Fluid Status or Edema, Nutrition Focused Physical Findings, Skin, Weight     Discharge Planning:     Too soon to determine       Some areas of assessment may be incomplete due to COVID-19 precautions    Wale Hoyt RD, LD  Office phone [phone]

## 2021-09-29 NOTE — PLAN OF CARE
Problem: Falls - Risk of:  Goal: Will remain free from falls  Description: Will remain free from falls  9/29/2021 1552 by Sirisha Gandhi RN  Outcome: Ongoing     Problem: Skin Integrity:  Goal: Will show no infection signs and symptoms  Description: Will show no infection signs and symptoms  9/29/2021 1552 by Sirisha Gandhi RN  Outcome: Ongoing     Problem: Pain:  Goal: Pain level will decrease  Description: Pain level will decrease  9/29/2021 1552 by Sirisha Gandhi RN  Outcome: Ongoing

## 2021-09-29 NOTE — PROGRESS NOTES
BRONCHOSPASM/BRONCHOCONSTRICTION     [x]         IMPROVE AERATION/BREATH SOUNDS  [x]   ADMINISTER BRONCHODILATOR THERAPY AS APPROPRIATE  [x]   ASSESS BREATH SOUNDS  []   IMPLEMENT AEROSOL/MDI PROTOCOL  [x]   PATIENT EDUCATION AS NEEDED    PATIENT REFUSES TO WEAR BIPAP     [x] Risks and benefits explained to patient   [x] Patient refuses to wear Bipap stating \"I don't wear that machine\"  [x] Patient verbalizes understanding of information presented.

## 2021-09-29 NOTE — PLAN OF CARE
Nutrition Problem #1: Inadequate oral intake  Intervention: Food and/or Nutrient Delivery: Continue Current Diet, Start Oral Nutrition Supplement  Nutritional Goals: PO intake % of most meals.

## 2021-09-29 NOTE — PROGRESS NOTES
Pulmonary Progress Note  Pulmonary and Critical Care Specialists      Patient - Modesta Maldonado,  Age - 64 y.o.    - 1960      Room Number - 4088/1748-39   N -  [id number]   [facility]  Date of Admission -  2021  3:48 PM    Consulting Nilo Dey MD  Primary Care Physician - [facility]   Patient resting quietly in bed. Her O2 saturations are 91 to 94% on room air. She is reluctant to wear the oxygen at night. She informs me that once she is discharged from acute rehab she plans to go back to her home in [place]. For that reason, she does not think she will be following with our services because of distance reasons. OBJECTIVE   VITALS    height is 5' 2\" (1.575 m) and weight is 252 lb (114.3 kg). Her temperature is 97.7 °F (36.5 °C). Her blood pressure is 129/64 and her pulse is 62. Her respiration is 16 and oxygen saturation is 91%. Body mass index is 46.09 kg/m². Temperature Range: Temp: 97.7 °F (36.5 °C) Temp  Av.1 °F (36.7 °C)  Min: 97.7 °F (36.5 °C)  Max: 98.4 °F (36.9 °C)  BP Range:  Systolic (43MVW), CDL:260 , Min:129 , BYI:222     Diastolic (30VQG), AVU:90, Min:56, Max:64    Pulse Range: Pulse  Av  Min: 62  Max: 72  Respiration Range: Resp  Av.3  Min: 16  Max: 20  Current Pulse Ox[de-identified]  SpO2: 91 %  24HR Pulse Ox Range:  SpO2  Av.8 %  Min: 88 %  Max: 94 %  Oxygen Amount and Delivery: O2 Flow Rate (L/min): 14 L/min    Wt Readings from Last 3 Encounters:   21 252 lb (114.3 kg)   21 258 lb 2.5 oz (117.1 kg)   21 240 lb (108.9 kg)       I/O (24 Hours)    Intake/Output Summary (Last 24 hours) at 2021 5945  Last data filed at 2021  Gross per 24 hour   Intake 480 ml   Output --   Net 480 ml       EXAM     General Appearance  Awake, alert, oriented, in no acute distress  HEENT - normocephalic, atraumatic.   Neck - Supple,  trachea midline   Lungs -coarse breath sounds no crackles rales or wheeze  Heart Exam:PMI normal. No lifts, heaves, or thrills. RRR. No murmurs, clicks, gallops, or rubs  Abdomen Exam: Abdomen soft, non-tender. BS normal. Extremity Exam: no signs of cyanosis    MEDS      aspirin  81 mg Oral BID    sennosides-docusate sodium  1 tablet Oral BID    amLODIPine  10 mg Oral Daily    ipratropium-albuterol  1 ampule Inhalation 4x daily    lisinopril  20 mg Oral Daily    polyethylene glycol  17 g Oral Daily       ondansetron, oxyCODONE **OR** oxyCODONE, calcium carbonate, magnesium hydroxide, acetaminophen, senna, bisacodyl    LABS   CBC No results for input(s): WBC, HGB, HCT, MCV, PLT in the last 72 hours. BMP:   Lab Results   Component Value Date     09/25/2021    K 4.6 09/25/2021     09/25/2021    CO2 29 09/25/2021    BUN 12 09/25/2021    CREATININE 0.53 09/25/2021    CALCIUM 8.8 09/25/2021    GFRAA >60 09/25/2021    LABGLOM >60 09/25/2021     ABGs:  Lab Results   Component Value Date    PHART 7.433 09/22/2021    PO2ART 67.7 09/22/2021    OHD9CBY 41.3 09/22/2021      Lab Results   Component Value Date    MODE NOT REPORTED 09/22/2021     Ionized Calcium:  No results found for: IONCA  Magnesium:    Lab Results   Component Value Date    MG 2.3 09/26/2021     Phosphorus:  No results found for: PHOS     LIVER PROFILE No results for input(s): AST, ALT, LIPASE, BILIDIR, BILITOT, ALKPHOS in the last 72 hours. Invalid input(s): AMYLASE,  ALB  INR No results for input(s): INR in the last 72 hours.   PTT No results found for: APTT      RADIOLOGY     (See actual reports for details)    ASSESSMENT/PLAN     Patient Active Problem List   Diagnosis    Primary osteoarthritis of knees, bilateral    Acute respiratory failure with hypoxia and hypercapnia (HCC)    KEVIN (obstructive sleep apnea)    Morbid obesity ([facility]    History of tobacco use    COPD (chronic obstructive pulmonary disease) ([facility]    Aftercare following bilateral knee joint replacement surgery    S/P total knee arthroplasty, bilateral     Patient with clinical diagnosis of both sleep apnea and COPD. She is declining BiPAP therapy. She is reluctant to use oxygen at night at this time. I was told that tentative discharge date is for October 5. Spoke to bedside nursing.   Asking bedside nursing to contact social work to see if there is a pulmonologist to practices in the [place] area    DVT prophylaxis per admitting team.    Electronically signed by Allegra Dumont MD on 9/29/2021 at 2:45 PM
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
## 2021-09-29 NOTE — CARE COORDINATION
CASE MANAGEMENT NOTE:    Admission Date:  9/24/2021 Brittaney Casiano is a 64 y.o.  female    Admitted for : Aftercare following bilateral knee joint replacement surgery [Z47.1, Z96.653]    Met with:  Patient      PCP:  Jessica Epstein      Current Residence/ Living Arrangements:  independently at home             Current Services PTA:  No    Is patient agreeable to VNS: Yes    Freedom of choice provided:  Yes    List of [address] provided: No    Home Health/ Out pt therapy chosen:  No    DME:  walker and a ramp    Home Oxygen: No    Nebulizer: No    CPAP/BIPAP: No    Supplier: unknown    Potential Assistance Needed: Yes    SNF needed: No    Freedom of choice and list provided: NA    Pharmacy:  [address] [place] [address]       Does Patient want to use MEDS to BEDS? Yes    Is patient currently receiving oral anticoagulation therapy? No    Is the Patient an [facility] with Readmission Risk Score greater than 14%? No  If yes, pt needs a follow up appointment made within 7 days. Family Members/Caregivers that pt would like involved in their care:    Yes    If yes, list name here:  Parents- Viviane Like and Shazia Amor    Transportation Provider:  Family        Handicap placard:  Pt has handicap placard          Is patient in Isolation/One on One/Altered Mental Status? No  If yes, skip next question. If no, would they like an I-Pad to  use? No  If yes, call [phone]. Mental Health History: Pt acknowledged depression and anxiety. Not current tx. 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 Her son lives with her but will be of little support as he recently broke his leg. Pt is unsure of transportation and if home health or out-pt therapy would be better.                   Patient Health Questionnaire-9 (PHQ-9)    Over the last 2 weeks, how often have you been bothered by any of the following problems? 1. Little Interest or pleasure in doing things? [x] Not at all  [] Several Days  [] More than half the day  []  Nearly every day    2. Feeling down, depressed or hopeless? [x] Not at all  [] Several Days  [] More than half the day  []  Nearly every day    3. Trouble falling or staying asleep, or sleeping too much? [x] Not at all  [] Several Days  [] More than half the day  []  Nearly every day    4. Feeling tired or having little energy? [] Not at all  [x] Several Days  [] More than half the day  []  Nearly every day    5. Poor apettite or overeating? [] Not at all  [x] Several Days  [] More than half the day  []  Nearly every day    6. Feeling bad about yourself-or that you are a failure or have let yourself or your family down? [x] Not at all  [] Several Days  [] More than half the day  []  Nearly every day    7. Trouble concentrating on things, such as reading the newspaper or watching television? [x] Not at all  [] Several Days  [] More than half the day  []  Nearly every day    8. Moving or speaking so slowly that other people could have noticed? Or the opposite-being so fidgety or restless that you have been moving around a lot more than usual?   [x] Not at all  [] Several Days  [] More than half the day  []  Nearly every day    9. Thoughts that you would be better off dead or of hurting yourself in some way? [x] Not at all  [] Several Days  [] More than half the day  []  Nearly every day    Total Score: 2 minimal s/s of depression. Pt identified her poor appetite to current nausea. Pt denied SI or thoughts of self harm. If you checked off any problems, how difficult have these problems made it for you to do your work, take care of things at home, or get along with other people?    [x] Not difficult at all  [] Somewhat Difficult  [] Very Difficult  []  Extremely Difficult       Electronically signed by: VIOLET Frederick on 9/29/2021 at 3:17 PM

## 2021-09-29 NOTE — PROGRESS NOTES
[facility]: CASIMIRO KRISHNAMURTHY   ACUTE REHABILITATION OCCUPATIONAL THERAPY  DAILY NOTE    Date: 21  Patient Name: Violet Wahl      Room: 4539/4269-33    MRN: [id number]   : 1960  (64 y.o.)  Gender: female   Referring Practitioner: Alexa Conroy MD/Dr. Mike Aguiar  Diagnosis: B TKA 21    Restrictions  Restrictions/Precautions: Weight Bearing, Surgical Protocols, Fall Risk  Implants present? : Metal implants (B TKA, Metal plate and screws in R UE)  Other position/activity restrictions: FWBAT RLE/LLE  Right Lower Extremity Weight Bearing: Weight Bearing As Tolerated  Left Lower Extremity Weight Bearing: Weight Bearing As Tolerated  Required Braces or Orthoses?: No  Equipment Used: Bed, Wheelchair    Subjective  Subjective: \"I just wish this right leg would loosen up a bit\"  Comments: Pt reporting increased stiffness/pain in RLE compared to LLE. Patient Currently in Pain: Yes  Pain Level: 5 (4-5/10)  Pain Location: Knee  Pain Orientation: Right;Left (R>L)  Restrictions/Precautions: Weight Bearing;Surgical Protocols; Fall Risk  Overall Orientation Status: Within Functional Limits     Pain Assessment  Pain Assessment: 0-10  Pain Level: 5 (4-5/10)  Patient's Stated Pain Goal: No pain  Pain Type: Acute pain, Surgical pain  Pain Location: Knee  Pain Orientation: Right, Left (R>L)  Pain Descriptors: Aching  Pain Frequency: Continuous  Response to Pain Intervention: None (pt's pain in bilat knees inc with mobility/activity)    Objective  Cognition  Overall Cognitive Status: WFL  Perception  Overall Perceptual Status: WFL  Balance  Sitting Balance: Independent  Standing Balance: Contact guard assistance (SBA/CGA)  Bed mobility  Supine to Sit: Minimal assistance (A to advance RLE out of bed)  Scooting: Stand by assistance  Comment: Pt left in w/c at end of session;  In PM session discussed use of leg  to increase IND, OT brayano'd and pt agreeable to trying next time she gets in/out of bed  Transfers  Stand Step Transfers: Stand by assistance  Stand Pivot Transfers: Stand by assistance  Sit to stand: Stand by assistance  Stand to sit: Stand by assistance  Transfer Comments: SBA for all transfers with RW except into shower/up ramp requiring CGA for safety     Functional Mobility  Functional - Mobility Device: Rolling Walker  Activity: To/from bathroom  Assist Level: Stand by assistance  Functional Mobility Comments: Good safety, no LOB, slow pace  Toilet Transfers  Toilet - Technique: Ambulating  Equipment Used: Raised toilet seat with rails  Toilet Transfer: Stand by assistance  Toilet Transfers Comments: w/RW  Shower Transfers  Shower - Transfer From: Walker  Shower - Transfer Type: To and From  Shower - Transfer To: Transfer tub bench  Shower - Technique: Ambulating;Stand pivot  Shower Transfers: Contact Guard;Stand by assistance  Shower Transfers Comments: Pt ambulated into shower with CGA and RW; Stand pivot to w/c after shower 2* fatigue, completed transfer with GB and SBA  Tub Transfers  Tub Transfers Comments: Discussed TTB vs shower chair 2* pt does not have GB and is limited in BLE. OT demo'd transfer and pt verbalized good understanding. Practice tub is much higher than pt's tub at home, but still recommend attempting transfer prior to discharge. Vision - Basic Assessment  Patient Visual Report: No visual complaint reported. Vision  Patient Visual Report: No visual complaint reported. ADL  Feeding: Modified independent  (Dentures)  Grooming: Setup  UE Bathing: Setup (Seated on TTB in shower)  LE Bathing: Moderate assistance (A for B lower legs/feet and buttocks)  UE Dressing: Setup  LE Dressing: Minimal assistance (Underwear/pants w/SBA; AE for socks; OT A with TEDs)  Toileting: Stand by assistance (Fiona-hygiene in sitting; No BM this AM)  Additional Comments: Pt ambulated into BR and completed toilet transfer and toileting with SBA using RW. Pt then ambulated into shower w/RW and CGA.   Pt completed bathing seated on TTB with HH shower head. Pt requested A to wash feet 2* fatigue, agreeable to trialling LH sponge in future session. Pt stood with SBA and BUE support on GB while OT assisted with washing buttocks. Pt then completed pivot to w/c with SBA and donned clothing. Inicidental A to thread L foot into underwear and SBA for safety in standing to pull clothing up over hips. Pt reported feeling very fatigued and slightly nauseous after shower and requested to stay in w/c at end of session instead of transferring to recliner. Assessment  Performance deficits / Impairments: Decreased functional mobility ; Decreased ADL status; Decreased ROM; Decreased strength;Decreased safe awareness;Decreased endurance;Decreased balance;Decreased high-level IADLs;Decreased coordination  Prognosis: Good  Discharge Recommendations: Home with assist PRN  Activity Tolerance: Patient Tolerated treatment well;Patient limited by fatigue;Treatment limited secondary to medical complications (free text)  Activity Tolerance: Pt fatigued/nauseous after shower; Nausea/heartburn in PM  Safety Devices in place: Yes  Type of devices: Patient at risk for falls;Gait belt;Left in chair;Call light within reach    Patient Education:  Patient Goals   Patient goals : To return to independence   Discharge planning, tub transfer, bed mobility, ADL/AE training  Learner:patient  Method: demonstration and explanation       Outcome: verbalized/demonstrated understanding and needs reinforcement     Plan  Plan  Times per week: 900/7  Times per day: Twice a day  Current Treatment Recommendations: Strengthening, ROM, Balance Training, Functional Mobility Training, Endurance Training, Pain Management, Safety Education & Training, Patient/Caregiver Education & Training, Equipment Evaluation, Education, & procurement, Self-Care / ADL, Home Management Training  Patient Goals   Patient goals :  To return to independence  Short term goals  Time Frame for Short term goals: One Week  Short term goal 1: Pt will complete UB bathing/dressing at Mod I level. Short term goal 2: Pt will complete LB bathing/dressing with Mod A and Good safety using AE as needed. Short term goal 3: Pt will complete toilet transfer and toileting tasks with Min A and Good safety using least restrictive device. Short term goal 4: Pt will tolerate standing for 5+ minutes with 1-2 UE support, SBA, and no LOB while completing a functional task. Short term goal 5: Pt will actively participate in 30 minutes of therapeutic exercise/activity to promote increased independence and safety with self-care and mobility. Long term goals  Time Frame for Long term goals : By Discharge  Long term goal 1: Pt will complete BADL's with Mod I and Good safety using AE as needed. Long term goal 2: Pt will complete functional transfers with Mod I and Good safety using least restrictive device. Long term goal 3: Pt will tolerate standing for 10 minutes with 1-2 UE support and no LOB while completing a functional task. Long term goal 4: Pt will complete light housekeeping/simple meal prep with SBA and Good safety using least restrictive device. Long term goal 5: Pt will V/D at least 3 non-pharmacological pain mgt techniques and 3 EC/WS strategies that she can utilize during home routines.      09/29/21 0803 09/29/21 1300   OT Individual Minutes   Time In 0803 1300   Time Out 0214 [id number]   Minutes 62 22   Time Code Minutes    Timed Code Treatment Minutes 62 Minutes 22 Minutes     Electronically signed by Stewart Myerstein on 9/29/21 at 1:31 PM EDT

## 2021-09-29 NOTE — PROGRESS NOTES
Physical Therapy  Facility/Department: [facility]  Daily Treatment Note  NAME: Wanda Vallejo  : 1960  MRN: [id number]    Date of Service: 2021    Discharge Recommendations:  Patient would benefit from continued therapy after discharge   PT Equipment Recommendations  Equipment Needed: Yes  Mobility Devices: Cohen Kumari: Rolling  Other: Need for RW TBD, pt has a 4WW but may require a more stable AD upon d/c    Assessment   Body structures, Functions, Activity limitations: Decreased functional mobility ; Decreased ROM; Decreased strength;Decreased endurance;Decreased balance; Increased pain  Treatment Diagnosis: impaired functional mobility secondary to Bilateral TKA procedure  Specific instructions for Next Treatment: transfer pt to sitting up in comfortable chair, seated ROM exercises for Bilateral knees. History: H/O HTN, Sleep apnea, current smoker, depression/anxiety. REQUIRES PT FOLLOW UP: Yes  Activity Tolerance  Activity Tolerance: Other (nausea )     Patient Diagnosis(es): There were no encounter diagnoses. has a past medical history of Anxiety, Arthritis, Depression, Hypertension, Knee pain, Murmur, PONV (postoperative nausea and vomiting), and Sleep apnea. has a past surgical history that includes Rotator cuff repair (Left); tumor removal; Ulnar tunnel release (Right); skin biopsy; eye surgery (Left, 2018); and knee surgery (Bilateral, 2021). Restrictions  Restrictions/Precautions  Restrictions/Precautions: Weight Bearing, Surgical Protocols, Fall Risk  Required Braces or Orthoses?: No  Implants present? : Metal implants (B TKA, Metal plate and screws in R UE)  Lower Extremity Weight Bearing Restrictions  Right Lower Extremity Weight Bearing: Weight Bearing As Tolerated  Left Lower Extremity Weight Bearing: Weight Bearing As Tolerated  Position Activity Restriction  Other position/activity restrictions: FWBAT RLE/LLE  Subjective   General  Chart Reviewed:  Yes  Additional Pertinent Hx: pt is a 64 y.o. woman recently admitted to hospital for Bilateral TKA procedure on 9/21/21 by Dr. Kareen Cody. Pt with initial diagnosis of Bilateral knee Degenerative Joint Disease and H/O osteoarthritis, sleep apnea (no machine), and HTN. Admitted to [facility] on 9/24/21  Response To Previous Treatment: Patient with no complaints from previous session. Family / Caregiver Present: No  Referring Practitioner: Dr. Zach Sawyer  Subjective  Subjective: Patient continues to be nauseated; agreeable to therapy   General Comment  Comments: Pateint continues to be nauseated during AM and PM session. Patient also became tearful during PM session d/t family issues with her son. Emotional support given   Pain Screening  Patient Currently in Pain: Yes  Pain Assessment  Pain Assessment: 0-10  Pain Level: 7  Pain Type: Acute pain;Surgical pain  Pain Location: Knee  Pain Orientation: Right;Left  Vital Signs  Patient Currently in Pain: Yes       Orientation  Orientation  Overall Orientation Status: Within Functional Limits  Objective   Bed mobility  Rolling to Left: Minimal assistance  Rolling to Right: Minimal assistance  Supine to Sit: Minimal assistance (utilized leg )  Sit to Supine: Minimal assistance (utilized leg )  Scooting: Stand by assistance  Comment: completed on mat with wedge and 2 pillows  Transfers  Sit to Stand: Contact guard assistance  Stand to sit: Contact guard assistance  Bed to Chair: Contact guard assistance  Stand Pivot Transfers: Contact guard assistance  Comment: slow guarded movements, Pt report increased stiffness. Ambulation  Ambulation?: Yes  WB Status: FWBAT BLEs  Ambulation 1  Surface: level tile  Device: Rolling Walker  Assistance: Contact guard assistance  Quality of Gait: Very slow & antalgic gait, Wide LUX, pt moves slowly/cautiously with dec bilateral step height and length. Heavy BUEs WB on RW  Gait Deviations: Slow Janet; Increased LUX; Decreased step care  Long term goal 6: pt to independently perform HEP strengthening and ROM with good technique to demo indepence with self-care routine  Long term goal 7: pt to amb 76' with RW in 2 minutes to demo improved gait speed to improve safety with household ambulation  Patient Goals   Patient goals : to return home safely    Plan    Plan  Times per week: 900 minutes per week of combined PT and OT  Times per day: Twice a day  Plan weeks: 1-2 weeks  Specific instructions for Next Treatment: transfer pt to sitting up in comfortable chair, seated ROM exercises for Bilateral knees. Current Treatment Recommendations: Strengthening, ROM, Balance Training, Functional Mobility Training, Transfer Training, Endurance Training, Gait Training, Stair training, Pain Management, Home Exercise Program, Safety Education & Training, Patient/Caregiver Education & Training, Equipment Evaluation, Education, & procurement  Safety Devices  Type of devices:  All fall risk precautions in place, Call light within reach, Left in bed  Restraints  Initially in place: No        09/29/21 1117 09/29/21 1405   PT Individual Minutes   Time In 1033 1329   Time Out 1115 1404   Minutes 42 35   PT Concurrent Minutes   Time In 0022 0940   Time Out [id number]   Minutes 4 5     Electronically signed by Marilyn Hernandez PTA on 9/29/21 at 2:22 PM EDT

## 2021-09-29 NOTE — CONSULTS
[facility] Internal Medicine    CONSULTATION    / FOLLOW UP VISIT       Date:   9/29/2021  Patient name:  Brittaney Casiano  Date of admission:  9/24/2021  3:48 PM  MRN:   [id number]  Account:  [de-identified]  YOB: 1960  PCP:    Saloni Muñoz  Room:   [id number]  Code Status:    Full Code    Physician Requesting Consult: Arabella Paz MD    History of Present Illness:      C/C ;  Medical comorbidity management     REASON FOR CONSULT;  Medical comorbidity and medication management ;                                                 *Principal Problem:    S/P total knee arthroplasty, bilateral  Active Problems: Morbid obesity ([facility] [address]    History of tobacco use    COPD (chronic obstructive pulmonary disease) ([facility] [address]    Aftercare following bilateral knee joint replacement surgery  Resolved Problems:    * No resolved hospital problems. *           HPI;    S/p bilateral knee arthroplasty   Known to have obstructive sleep apnea   Did have acute exacerbation of COPD  Was seen by Dr. Yamil Ndiaye hypertensive  Patient is complaining of nausea and also have constipation. Denies vomiting, abdominal pain , urinary symptoms  Vitals:    09/28/21 1945 09/29/21 0649 09/29/21 0651 09/29/21 1117   BP: (!) 131/56 129/64     Pulse: 72 62     Resp: 20 16     Temp: 98.4 °F (36.9 °C) 97.7 °F (36.5 °C)     TempSrc: Oral      SpO2:  94% 94% 91%   Weight:       Height:          The patient is a 64y.o.-year-old with ADL and mobility deficits secondary to knee osteoarthritis s/p bilateral total knee arthroplasty. She will require close medical monitoring for the comorbidities listed below. She will benefit from intensive interdisciplinary therapies and rehab nursing care and is appropriate for inpatient rehabilitation. The post admission physician evaluation (STEPHANIE) is consistent with the pre-admission assessment. See above findings to reflect the elements required in the STEPHANIE. Patient's admitting condition is consistent with the findings of the preadmission assessment by the rehabilitation admissions coordinator. Diagnoses/plan:     1. Bilateral knee osteoarthritis s/p bilateral total knee arthroplasty:  Procedure done 9/21/21. WBAT to the bilateral lower limbs. PT/OT for gait, mobility, strengthening, endurance, ADLs, and self care. Pain control with tylenol and oxycodone as needed. Follow up with Dr. Noemi Godoy as outpatient. 2. Bilateral hand cramps:  Patient reports that she has had these intermittently in the past.  Electrolytes WNL. Magnesium is normal.  Heat/ice as needed. Added lidocaine cream as needed for pain. 3. HTN:  On amlodipine, lisinopril  4. COPD:  Per pulmonology. On duo-neb 4 times daily. Will need PFTs as outpatient. 5. KEVIN:  Did not tolerate nocturnal bipap. Continue nasal cannula oxygen at night (at least 2-3 L to keep O2 sat > 88%). Will need sleep study as outpatient. 6. Depression, anxiety:  Not currently on medication  7. Morbid obesity:  BMI 46.09  8. Bowel Management: Miralax daily, senokot prn, dulcolax prn, milk of magnesia prn.  9. DVT Prophylaxis:  aspirin BID  10. Internal Medicine for medical management          Past and Surgical hx as in H and P  Social History:     Tobacco:    reports that she has been smoking. She has been smoking about 1.00 pack per day. She has never used smokeless tobacco.  Alcohol:      reports previous alcohol use. Drug Use:  reports previous drug use. Review of Systems:     POSITIVE AND NEGATIVES AS DESCRIBED IN HISTORY OF PRESENT ILLNESS ;  IN ADDITION ;  Review of Systems   Constitutional: Positive for activity change and appetite change. Negative for chills, diaphoresis, fatigue, fever and unexpected weight change. HENT: Negative for congestion, sore throat, trouble swallowing and voice change. Eyes: Negative for photophobia, discharge, itching and visual disturbance.    Respiratory: Positive for cough and LABURIN     CBC:  Lab Results   Component Value Date    WBC 9.4 09/25/2021    HGB 12.4 09/25/2021     09/25/2021        BMP:    Lab Results   Component Value Date     09/25/2021    K 4.6 09/25/2021     09/25/2021    CO2 29 09/25/2021    BUN 12 09/25/2021    CREATININE 0.53 09/25/2021    GLUCOSE 139 09/25/2021      LIVER PROFILE:No results found for: ALT, AST, PROT, BILITOT, BILIDIR, LABALBU          Radiology:         Medications: Allergies:  No Known Allergies    Current Meds:   Scheduled Meds:    aspirin  81 mg Oral BID    sennosides-docusate sodium  1 tablet Oral BID    amLODIPine  10 mg Oral Daily    ipratropium-albuterol  1 ampule Inhalation 4x daily    lisinopril  20 mg Oral Daily    polyethylene glycol  17 g Oral Daily     Continuous Infusions:   PRN Meds: ondansetron, oxyCODONE **OR** oxyCODONE, calcium carbonate, magnesium hydroxide, acetaminophen, senna, bisacodyl        Assessment :       Assessment Dx  Principal Problem:    S/P total knee arthroplasty, bilateral  Active Problems: Morbid obesity ([facility] [address]    History of tobacco use    COPD (chronic obstructive pulmonary disease) ([facility] [address]    Aftercare following bilateral knee joint replacement surgery  Resolved Problems:    * No resolved hospital problems. *              Plan:     Patient s/p bilateral knee arthroplasty   Has advanced COPD obstructive sleep apnea morbid obesity         9/29/2021    Bilateral knee osteoarthritis s/p arthroplasty:  -Continue physical therapy  -Analgesics for pain control    Hypertension:  -Continue amlodipine 10 mg daily and lisinopril 20 mg daily  -Blood pressure is optimally controlled    COPD, not currently in exacerbation:  -Continue DuoNeb nebulizations 4 times daily  -BiPAP    Constipation:  -Continue Senokot 1 tablet 2 times daily, GlycoLax 17 g orally daily  -If no bowel movement by tomorrow, will consider enema  -Antiemetics as needed           Thanks for consulting us .   Will monitor vitals and clinical course , and  Optimize therapy  as needed . Jamel Palacio MD   Internal medicine resident, PGY 2    Copy sent to Dr. Saravanan Soni that this chart was generated using voice recognition Dragon dictation software. Although every effort was made to ensure the accuracy of this automated transcription, some errors in transcription may have occurred.

## 2021-09-29 NOTE — PLAN OF CARE
Problem: Falls - Risk of:  Goal: Will remain free from falls  Description: Will remain free from falls  9/29/2021 0239 by Jose De Jesus Long RN  Outcome: Ongoing  9/28/2021 1830 by Atif Puente RN  Outcome: Ongoing  Goal: Absence of physical injury  Description: Absence of physical injury  9/29/2021 0239 by Jose De Jesus Long RN  Outcome: Ongoing  9/28/2021 1830 by Atif Puente RN  Outcome: Ongoing     Problem: Skin Integrity:  Goal: Will show no infection signs and symptoms  Description: Will show no infection signs and symptoms  9/29/2021 0239 by Jose De Jesus Long RN  Outcome: Ongoing  9/28/2021 1830 by Atif Puente RN  Outcome: Ongoing  Goal: Absence of new skin breakdown  Description: Absence of new skin breakdown  9/29/2021 0239 by Jose De Jesus Long RN  Outcome: Ongoing  9/28/2021 1830 by Atif Puente RN  Outcome: Ongoing     Problem: Pain:  Goal: Pain level will decrease  Description: Pain level will decrease  9/29/2021 0239 by Jose De Jesus Long RN  Outcome: Ongoing  9/28/2021 1830 by Atif Puente RN  Outcome: Ongoing  Goal: Control of acute pain  Description: Control of acute pain  9/29/2021 0239 by Jose De Jesus Long RN  Outcome: Ongoing  9/28/2021 1830 by Atif Puente RN  Outcome: Ongoing  Goal: Control of chronic pain  Description: Control of chronic pain  9/29/2021 0239 by Jose De Jesus Long RN  Outcome: Ongoing  9/28/2021 1830 by Atif Puente RN  Outcome: Ongoing     Problem: Musculor/Skeletal Functional Status  Goal: Highest potential functional level  9/29/2021 0239 by Jose De Jesus Long RN  Outcome: Ongoing  9/28/2021 1830 by Atif Puente RN  Outcome: Ongoing  Goal: Absence of falls  9/29/2021 0239 by Jose D eJesus Long RN  Outcome: Ongoing  9/28/2021 1830 by Atif Puente RN  Outcome: Ongoing     Problem: Nutrition  Goal: Optimal nutrition therapy  9/29/2021 0239 by Jose De Jesus Long RN  Outcome: Ongoing  9/28/2021 1830 by Atif Puente RN  Outcome: Ongoing

## 2021-09-29 NOTE — PROGRESS NOTES
requesting to keep same dress on. )  LE Dressing: Stand by assistance, Minimal assistance (donned/doffed socks w/ SBA & AE, A w/ TEDs, no pants today. )  Toileting: Contact guard assistance (managed dress, pull ups down/up, hygiene post void)  Additional Comments: Pt continues to require encouragment, education and increased time regarding participation in ADL's. Pt with limited outfits availabe, requesting to remain in same dress for ease today. Balance  Sitting Balance: Supervision (seated EOB, seated on toilet, mod I in w/c. )  Standing Balance: Contact guard assistance   Standing Balance  Time: AM: 1-2 min x 2  Activity: AM: functional mobility/transfers, self care tasks  Comment: 1-2 UE support, briefly able to stand unsupported during toileting tasks  Functional Mobility  Functional - Mobility Device: Rolling Walker  Activity: To/from bathroom  Assist Level: Contact guard assistance  Functional Mobility Comments: Good safety, no LOB, slow pace     Bed mobility  Supine to Sit: Minimal assistance (Assist to advance RLE)  Sit to Supine: Moderate assistance (assist with B LEs)  Scooting: Contact guard assistance, Stand by assistance (at EOB)  Comment: Head of bed elevated, max use of bed rail, increased time.    Transfers  Stand Step Transfers: Contact guard assistance  Stand Pivot Transfers: Contact guard assistance  Sit to stand: Contact guard assistance  Stand to sit: Contact guard assistance  Transfer Comments: Sit>stand improved to CGA in PM   Toilet Transfers  Toilet - Technique: Ambulating  Equipment Used: Raised toilet seat with rails  Toilet Transfer: Stand by assistance  Toilet Transfers Comments: w/RW        Wheelchair Bed Transfers  Wheelchair/Bed - Technique: Stand pivot  Equipment Used: Bed, Wheelchair  Level of Asssistance: Minimal assistance  Wheelchair Transfers Comments: Min A to stand from EOB, CGA for pivot with RW      ST:            Objective:  /64   Pulse 62   Temp 97.7 °F (36.5 °C)   Resp 16   Ht 5' 2\" (1.575 m)   Wt 252 lb (114.3 kg)   SpO2 94%   BMI 46.09 kg/m²  I Body mass index is 46.09 kg/m². I   Wt Readings from Last 1 Encounters:   21 252 lb (114.3 kg)      Temp (24hrs), Av.1 °F (36.7 °C), Min:97.7 °F (36.5 °C), Max:98.4 °F (36.9 °C)         GEN: well developed, well nourished, no acute distress  HEENT: Normocephalic atraumatic, EOMI, mucous membranes pink and moist  CV: RRR, no murmurs, rubs or gallops  PULM: CTAB, no rales or rhonchi. Respirations WNL and unlabored  ABD: soft, NT, ND, +BS and equal, no guarding or rebound  NEURO: A&O x3. Sensation intact to light touch. MSK: 4+/5 upper extremity distal lower extremities, knee incisions with Aquacel mild drainage, no erythema  around dressing, no significant drainage  EXTREMITIES: No calf tenderness to palpation bilaterally. 1+ edema lower extremities  SKIN: warm dry and intact with good turgor  PSYCH: appropriately interactive. Affect WNL. Good spirits        Medications   Scheduled Meds:   aspirin  81 mg Oral BID    sennosides-docusate sodium  1 tablet Oral BID    amLODIPine  10 mg Oral Daily    ipratropium-albuterol  1 ampule Inhalation 4x daily    lisinopril  20 mg Oral Daily    polyethylene glycol  17 g Oral Daily     Continuous Infusions:  PRN Meds:.ondansetron, oxyCODONE **OR** oxyCODONE, calcium carbonate, magnesium hydroxide, acetaminophen, senna, bisacodyl     Diagnostics:     CBC:   No results for input(s): WBC, RBC, HGB, HCT, MCV, RDW, PLT in the last 72 hours. BMP:   No results for input(s): NA, K, CL, CO2, PHOS, BUN, CREATININE in the last 72 hours. Invalid input(s): CA  BNP: No results for input(s): BNP in the last 72 hours. PT/INR: No results for input(s): PROTIME, INR in the last 72 hours. APTT: No results for input(s): APTT in the last 72 hours. CARDIAC ENZYMES: No results for input(s): CKMB, CKMBINDEX, TROPONINT in the last 72 hours.     Invalid input(s): CKTOTAL;3  FASTING LIPID PANEL:No results found for: CHOL, HDL, TRIG  LIVER PROFILE: No results for input(s): AST, ALT, ALB, BILIDIR, BILITOT, ALKPHOS in the last 72 hours. I/O (24Hr): Intake/Output Summary (Last 24 hours) at 9/29/2021 1100  Last data filed at 9/28/2021 1945  Gross per 24 hour   Intake 480 ml   Output --   Net 480 ml       Glu last 24 hour  No results for input(s): POCGLU in the last 72 hours. No results for input(s): CLARITYU, COLORU, PHUR, SPECGRAV, PROTEINU, RBCUA, BLOODU, BACTERIA, NITRU, WBCUA, LEUKOCYTESUR, YEAST, Daralyn Lords in the last 72 hours.         Impression/Plan:   Impaired ADLs, gait, and mobility due to:     1. Bilateral knee osteoarthritis s/p bilateral total knee arthroplasty:  Procedure done 9/21/21.  WBAT to the bilateral lower limbs.  Continue rehab efforts, Follow up with Dr. Joseline Bassett as outpatient, , discharge plan 10/5  2. Pain-tylenol and oxycodone as needed.  . Using 10 mg of oxycodone-discussed with patient to begin tapering-agreeable to decrease to every 6 hours-recommend try to decrease to 5  3. Chronic incontinence of urine-check PVR, discussed with nursing-pending  4. Nausea/vomiting a.m.- Zofran, questionable secondary to pain meds-decrease as above, reviewed consideration of NicoDerm patch -patient does not want, recheck labs in a.m.  5. Bilateral hand cramps:  Patient reports that she has had these intermittently in the past.  Electrolytes WNL, including magnesium. Heat/ice as needed. Will DC lidocaine cream to hands, not using, monitor  6. HTN:  On amlodipine, lisinopril  7. Clinical COPD:  Per pulmonology.  On duo-neb 4 times daily-questionable change to aerosol.  Will need PFTs as outpatient.,  Will need home O2 evaluation  8. KEVIN:  Did not tolerate nocturnal bipap.  Continue nasal cannula oxygen at night (at least 2-3 L to keep O2 sat > 88%).  Will need sleep study as outpatient. 9. Depression, anxiety:  Not currently on medication  10.  Morbid obesity:  BMI 46.09  11. Bowel Management: Miralax daily, senokot prn, dulcolax prn, milk of magnesia prn. Last BM 9/26-discussed with nursing-Senokot today, MiraLAX and suppository if no BM, if still no results consider subset enema  12. DVT Prophylaxis:  aspirin BID  13. Internal Medicine for medical management    Rony Cardona. Judah Manning MD       This note is created with the assistance of a speech recognition program.  While intending to generate a document that actually reflects the content of the visit, the document can still have some errors including those of syntax and sound a like substitutions which may escape proof reading.   In such instances, actual meaning can be extrapolated by contextual diversion

## 2021-09-30 LAB
ANION GAP SERPL CALCULATED.3IONS-SCNC: 8 MMOL/L (ref 9–17)
BUN BLDV-MCNC: 13 MG/DL (ref 8–23)
BUN/CREAT BLD: ABNORMAL (ref 9–20)
CALCIUM SERPL-MCNC: 8.7 MG/DL (ref 8.6–10.4)
CHLORIDE BLD-SCNC: 101 MMOL/L (ref 98–107)
CO2: 30 MMOL/L (ref 20–31)
CREAT SERPL-MCNC: 0.61 MG/DL (ref 0.5–0.9)
GFR AFRICAN AMERICAN: >60 ML/MIN
GFR NON-AFRICAN AMERICAN: >60 ML/MIN
GFR SERPL CREATININE-BSD FRML MDRD: ABNORMAL ML/MIN/{1.73_M2}
GFR SERPL CREATININE-BSD FRML MDRD: ABNORMAL ML/MIN/{1.73_M2}
GLUCOSE BLD-MCNC: 106 MG/DL (ref 70–99)
HCT VFR BLD CALC: 38.9 % (ref 36–46)
HEMOGLOBIN: 13 G/DL (ref 12–16)
MCH RBC QN AUTO: 29.9 PG (ref 26–34)
MCHC RBC AUTO-ENTMCNC: 33.3 G/DL (ref 31–37)
MCV RBC AUTO: 89.7 FL (ref 80–100)
NRBC AUTOMATED: NORMAL PER 100 WBC
PDW BLD-RTO: 13.6 % (ref 11.5–14.9)
PLATELET # BLD: 398 K/UL (ref 150–450)
PMV BLD AUTO: 6.9 FL (ref 6–12)
POTASSIUM SERPL-SCNC: 4.4 MMOL/L (ref 3.7–5.3)
RBC # BLD: 4.34 M/UL (ref 4–5.2)
SODIUM BLD-SCNC: 139 MMOL/L (ref 135–144)
WBC # BLD: 9.8 K/UL (ref 3.5–11)

## 2021-09-30 PROCEDURE — 85027 COMPLETE CBC AUTOMATED: CPT

## 2021-09-30 PROCEDURE — 80048 BASIC METABOLIC PNL TOTAL CA: CPT

## 2021-09-30 PROCEDURE — 6370000000 HC RX 637 (ALT 250 FOR IP): Performed by: PHYSICAL MEDICINE & REHABILITATION

## 2021-09-30 PROCEDURE — 6370000000 HC RX 637 (ALT 250 FOR IP): Performed by: INTERNAL MEDICINE

## 2021-09-30 PROCEDURE — 97116 GAIT TRAINING THERAPY: CPT

## 2021-09-30 PROCEDURE — 6370000000 HC RX 637 (ALT 250 FOR IP): Performed by: ORTHOPAEDIC SURGERY

## 2021-09-30 PROCEDURE — 94640 AIRWAY INHALATION TREATMENT: CPT

## 2021-09-30 PROCEDURE — 94761 N-INVAS EAR/PLS OXIMETRY MLT: CPT

## 2021-09-30 PROCEDURE — 1180000000 HC REHAB R&B

## 2021-09-30 PROCEDURE — 99231 SBSQ HOSP IP/OBS SF/LOW 25: CPT | Performed by: INTERNAL MEDICINE

## 2021-09-30 PROCEDURE — 97110 THERAPEUTIC EXERCISES: CPT

## 2021-09-30 PROCEDURE — 99232 SBSQ HOSP IP/OBS MODERATE 35: CPT | Performed by: PHYSICAL MEDICINE & REHABILITATION

## 2021-09-30 PROCEDURE — 6370000000 HC RX 637 (ALT 250 FOR IP): Performed by: STUDENT IN AN ORGANIZED HEALTH CARE EDUCATION/TRAINING PROGRAM

## 2021-09-30 PROCEDURE — 97535 SELF CARE MNGMENT TRAINING: CPT

## 2021-09-30 PROCEDURE — 36415 COLL VENOUS BLD VENIPUNCTURE: CPT

## 2021-09-30 RX ADMIN — DOCUSATE SODIUM 50MG AND SENNOSIDES 8.6MG 1 TABLET: 8.6; 5 TABLET, FILM COATED ORAL at 08:03

## 2021-09-30 RX ADMIN — ANTACID TABLETS 500 MG: 500 TABLET, CHEWABLE ORAL at 08:45

## 2021-09-30 RX ADMIN — ASPIRIN 81 MG: 81 TABLET, COATED ORAL at 21:20

## 2021-09-30 RX ADMIN — ASPIRIN 81 MG: 81 TABLET, COATED ORAL at 08:04

## 2021-09-30 RX ADMIN — IPRATROPIUM BROMIDE AND ALBUTEROL SULFATE 1 AMPULE: .5; 3 SOLUTION RESPIRATORY (INHALATION) at 06:57

## 2021-09-30 RX ADMIN — OXYCODONE HYDROCHLORIDE 10 MG: 10 TABLET ORAL at 21:24

## 2021-09-30 RX ADMIN — DOCUSATE SODIUM 50MG AND SENNOSIDES 8.6MG 1 TABLET: 8.6; 5 TABLET, FILM COATED ORAL at 21:20

## 2021-09-30 RX ADMIN — LISINOPRIL 20 MG: 20 TABLET ORAL at 08:03

## 2021-09-30 RX ADMIN — ACETAMINOPHEN 650 MG: 325 TABLET ORAL at 21:20

## 2021-09-30 RX ADMIN — IPRATROPIUM BROMIDE AND ALBUTEROL SULFATE 1 AMPULE: .5; 3 SOLUTION RESPIRATORY (INHALATION) at 12:11

## 2021-09-30 RX ADMIN — AMLODIPINE BESYLATE 10 MG: 10 TABLET ORAL at 08:02

## 2021-09-30 ASSESSMENT — PAIN DESCRIPTION - PROGRESSION
CLINICAL_PROGRESSION: NOT CHANGED
CLINICAL_PROGRESSION: NOT CHANGED

## 2021-09-30 ASSESSMENT — PAIN DESCRIPTION - FREQUENCY
FREQUENCY: CONTINUOUS
FREQUENCY: OTHER (COMMENT)

## 2021-09-30 ASSESSMENT — PAIN DESCRIPTION - LOCATION
LOCATION: KNEE
LOCATION: KNEE

## 2021-09-30 ASSESSMENT — PAIN - FUNCTIONAL ASSESSMENT
PAIN_FUNCTIONAL_ASSESSMENT: ACTIVITIES ARE NOT PREVENTED
PAIN_FUNCTIONAL_ASSESSMENT: ACTIVITIES ARE NOT PREVENTED

## 2021-09-30 ASSESSMENT — PAIN SCALES - GENERAL
PAINLEVEL_OUTOF10: 9
PAINLEVEL_OUTOF10: 9

## 2021-09-30 ASSESSMENT — PAIN DESCRIPTION - ORIENTATION
ORIENTATION: RIGHT;LEFT
ORIENTATION: RIGHT;LEFT

## 2021-09-30 ASSESSMENT — PAIN DESCRIPTION - ONSET
ONSET: GRADUAL
ONSET: GRADUAL

## 2021-09-30 ASSESSMENT — PAIN DESCRIPTION - PAIN TYPE
TYPE: SURGICAL PAIN
TYPE: SURGICAL PAIN

## 2021-09-30 NOTE — PLAN OF CARE
Problem: Falls - Risk of:  Goal: Will remain free from falls  Description: Will remain free from falls  9/30/2021 1439 by Agueda Crawford RN  Outcome: Ongoing  Note: Patient remains free of falls and injuries throughout shift. Bed remains in the lowest position, wheels locked, call light and bedside table are within reach. Problem: Skin Integrity:  Goal: Will show no infection signs and symptoms  Description: Will show no infection signs and symptoms  9/30/2021 1439 by Agueda Crawford RN  Outcome: Ongoing  Note: No signs of increased skin or tissue breakdown is noted. See Head to Toe/LDA assessments in flowsheets. Problem: Pain:  Goal: Pain level will decrease  Description: Pain level will decrease  9/30/2021 1439 by Agueda Crawford RN  Outcome: Ongoing  Note: Patient's pain is well controlled with current regimen. See MAR.

## 2021-09-30 NOTE — CONSULTS
[facility] Internal Medicine    CONSULTATION    / FOLLOW UP VISIT       Date:   9/30/2021  Patient name:  Lorne Urbano  Date of admission:  9/24/2021  3:48 PM  MRN:   [id number]  Account:  [de-identified]  YOB: 1960  PCP:    Lilli Ocampo  Room:   2602608-01  Code Status:    Full Code    Physician Requesting Consult: Nilda Mccullough MD    History of Present Illness:      C/C ;  Medical comorbidity management     REASON FOR CONSULT;  Medical comorbidity and medication management ;                                                 *Principal Problem:    S/P total knee arthroplasty, bilateral  Active Problems: Morbid obesity ([facility] [address]    History of tobacco use    COPD (chronic obstructive pulmonary disease) ([facility] [address]    Aftercare following bilateral knee joint replacement surgery  Resolved Problems:    * No resolved hospital problems. *           HPI;    S/p bilateral knee arthroplasty   Known to have obstructive sleep apnea   Did have acute exacerbation of COPD  Was seen by Dr. Ciara Rangel hypertensive  Patient is complaining of nausea and also have constipation. Denies vomiting, abdominal pain , urinary symptoms  Vitals:    09/29/21 2130 09/30/21 0657 09/30/21 0735 09/30/21 1211   BP: 125/66  131/65    Pulse: 73  65    Resp: 18 18 16 18   Temp: 98.2 °F (36.8 °C)  98.1 °F (36.7 °C)    TempSrc:       SpO2: 92% 90% 94% (!) 87%   Weight:       Height:          The patient is a 64y.o.-year-old with ADL and mobility deficits secondary to knee osteoarthritis s/p bilateral total knee arthroplasty. She will require close medical monitoring for the comorbidities listed below. She will benefit from intensive interdisciplinary therapies and rehab nursing care and is appropriate for inpatient rehabilitation. The post admission physician evaluation (STEPHANIE) is consistent with the pre-admission assessment. See above findings to reflect the elements required in the STEPHANIE. Patient's admitting condition is consistent with the findings of the preadmission assessment by the rehabilitation admissions coordinator. Diagnoses/plan:     1. Bilateral knee osteoarthritis s/p bilateral total knee arthroplasty:  Procedure done 9/21/21. WBAT to the bilateral lower limbs. PT/OT for gait, mobility, strengthening, endurance, ADLs, and self care. Pain control with tylenol and oxycodone as needed. Follow up with Dr. Jeni Trejo as outpatient. 2. Bilateral hand cramps:  Patient reports that she has had these intermittently in the past.  Electrolytes WNL. Magnesium is normal.  Heat/ice as needed. Added lidocaine cream as needed for pain. 3. HTN:  On amlodipine, lisinopril  4. COPD:  Per pulmonology. On duo-neb 4 times daily. Will need PFTs as outpatient. 5. KEVIN:  Did not tolerate nocturnal bipap. Continue nasal cannula oxygen at night (at least 2-3 L to keep O2 sat > 88%). Will need sleep study as outpatient. 6. Depression, anxiety:  Not currently on medication  7. Morbid obesity:  BMI 46.09  8. Bowel Management: Miralax daily, senokot prn, dulcolax prn, milk of magnesia prn.  9. DVT Prophylaxis:  aspirin BID  10. Internal Medicine for medical management          Past and Surgical hx as in H and P  Social History:     Tobacco:    reports that she has been smoking. She has been smoking about 1.00 pack per day. She has never used smokeless tobacco.  Alcohol:      reports previous alcohol use. Drug Use:  reports previous drug use. Review of Systems:     POSITIVE AND NEGATIVES AS DESCRIBED IN HISTORY OF PRESENT ILLNESS ;  IN ADDITION ;  Review of Systems   Constitutional: Positive for activity change and appetite change. Negative for chills, diaphoresis, fatigue, fever and unexpected weight change. HENT: Negative for congestion, sore throat, trouble swallowing and voice change. Eyes: Negative for photophobia, discharge, itching and visual disturbance.    Respiratory: Positive for cough and shortness of breath. Negative for apnea, choking, chest tightness, wheezing and stridor. Cardiovascular: Positive for leg swelling. Negative for chest pain and palpitations. Gastrointestinal: Positive for constipation and nausea. Negative for abdominal distention and vomiting. Endocrine: Negative for cold intolerance and heat intolerance. Genitourinary: Negative for difficulty urinating and dysuria. Musculoskeletal: Positive for gait problem. Negative for arthralgias and back pain. Neurological: Negative for dizziness, tremors, seizures, syncope, facial asymmetry, speech difficulty, light-headedness, numbness and headaches. Hematological: Negative for adenopathy. Psychiatric/Behavioral: Negative for agitation, behavioral problems, confusion, decreased concentration, dysphoric mood, hallucinations, self-injury, sleep disturbance and suicidal ideas. The patient is not nervous/anxious. All other systems negative                Physical Exam:     Physical Exam   Vitals:    09/29/21 2130 09/30/21 0657 09/30/21 0735 09/30/21 1211   BP: 125/66  131/65    Pulse: 73  65    Resp: 18 18 16 18   Temp: 98.2 °F (36.8 °C)  98.1 °F (36.7 °C)    TempSrc:       SpO2: 92% 90% 94% (!) 87%   Weight:       Height:                       Body mass index is 46.09 kg/m². General Appearance:   -, CO-OPERATIVE ,                                                        Pulmonary/Chest:        Clear to auscultation bilaterally . No wheezes, rales or rhonchi . Cardiovascular:            Normal rate, regular rhythm,                                          No murmur or  Gallop . Abdomen:                       Soft, non-tender                                                                                    Extremities:                    No Edema . Neuromuskuloskeletal    ...      Data:     URINE ANALYSIS: No results found for: LABURIN     CBC:  Lab Results   Component Value Date    WBC 9.8 09/30/2021    HGB 13.0 09/30/2021     09/30/2021        BMP:    Lab Results   Component Value Date     09/30/2021    K 4.4 09/30/2021     09/30/2021    CO2 30 09/30/2021    BUN 13 09/30/2021    CREATININE 0.61 09/30/2021    GLUCOSE 106 09/30/2021      LIVER PROFILE:No results found for: ALT, AST, PROT, BILITOT, BILIDIR, LABALBU          Radiology:         Medications: Allergies:  No Known Allergies    Current Meds:   Scheduled Meds:    aspirin  81 mg Oral BID    sennosides-docusate sodium  1 tablet Oral BID    amLODIPine  10 mg Oral Daily    ipratropium-albuterol  1 ampule Inhalation 4x daily    lisinopril  20 mg Oral Daily    polyethylene glycol  17 g Oral Daily     Continuous Infusions:   PRN Meds: aluminum & magnesium hydroxide-simethicone, ondansetron, oxyCODONE **OR** oxyCODONE, calcium carbonate, magnesium hydroxide, acetaminophen, senna, bisacodyl        Assessment :       Assessment Dx  Principal Problem:    S/P total knee arthroplasty, bilateral  Active Problems: Morbid obesity ([facility] [address]    History of tobacco use    COPD (chronic obstructive pulmonary disease) ([facility] [address]    Aftercare following bilateral knee joint replacement surgery  Resolved Problems:    * No resolved hospital problems.  *              Plan:     Patient s/p bilateral knee arthroplasty   Has advanced COPD obstructive sleep apnea morbid obesity         9/30/2021    Bilateral knee osteoarthritis s/p arthroplasty:  -Continue physical therapy  -Analgesics for pain control    Hypertension:  -Continue amlodipine 10 mg daily and lisinopril 20 mg daily  -Blood pressure is optimally controlled    COPD, not currently in exacerbation:  -Continue DuoNeb nebulizations 4 times daily  -BiPAP    Constipation:  -Continue Senokot 1 tablet 2 times daily, GlycoLax 17 g orally daily  -If no bowel movement by tomorrow, will consider enema  -Antiemetics as needed Home tomorrow    Thanks for consulting us . Will monitor vitals and clinical course , and  Optimize therapy  as needed . Rosa Goddard MD   Internal medicine resident, PGY 2    Copy sent to Dr. Daniel Neal that this chart was generated using voice recognition Dragon dictation software. Although every effort was made to ensure the accuracy of this automated transcription, some errors in transcription may have occurred.

## 2021-09-30 NOTE — PROGRESS NOTES
[address]   ACUTE REHABILITATION OCCUPATIONAL THERAPY  DAILY NOTE    Date: 21  Patient Name: Matt Arenas      Room: 8095/7307-39    MRN: [id number]   : 1960  (64 y.o.)  Gender: female   Referring Practitioner: Shaggy Michelle MD/Dr. Patel Ordaz  Diagnosis: B TKA 21       Restrictions  Restrictions/Precautions: Weight Bearing, Surgical Protocols, Fall Risk  Implants present? : Metal implants  Other position/activity restrictions: FWBAT RLE/LLE  Right Lower Extremity Weight Bearing: Weight Bearing As Tolerated  Left Lower Extremity Weight Bearing: Weight Bearing As Tolerated  Required Braces or Orthoses?: No  Equipment Used: Bed, Wheelchair    Subjective  Subjective: \"I'm not a anytime person\" Pt requires increased time participation in therapy this AM.   Comments: Pt reports increased nausea this AM after morning meds. Nurse Jeannie Holland provides anti nausea meds. Patient Currently in Pain: Denies  Restrictions/Precautions: Weight Bearing;Surgical Protocols; Fall Risk  Overall Orientation Status: Within Functional Limits  Patient Observation  Observations: PM: Pt declines therapy due to increased nausea. Objective  Cognition  Overall Cognitive Status: WFL  Perception  Overall Perceptual Status: WFL  Balance  Sitting Balance: Independent  Standing Balance: Stand by assistance  Transfers  Sit to stand: Stand by assistance  Stand to sit: Stand by assistance  Transfer Comments: VC for hand placement standing from EOB. good carryover noted for sitting into w/c.    Standing Balance  Time: 3-4 min, 30 sec, 1 min  Activity: functional mobility, ADLs  Functional Mobility  Functional - Mobility Device: Rolling Walker  Activity: To/from bathroom  Assist Level: Stand by assistance  Functional Mobility Comments: Good safety, no LOB, slow pace  Toilet Transfers  Toilet - Technique: Ambulating  Equipment Used: Raised toilet seat with rails  Toilet Transfer: Stand by assistance  Toilet Transfers Comments: w/RW     Type of ROM/Therapeutic Exercise  Type of ROM/Therapeutic Exercise: Free weights  Comment: Pt instruction in BUe exercises for facilitation of increased strength and overall activity tolerance during ADLs. Pt completes with 2# X15 reps each. Requires intermittent rest breaks due to increased fatigue. Exercises  Scapular Protraction: x  Scapular Retraction: x  Shoulder Flexion: x  Shoulder Extension: x  Elbow Flexion: x  Elbow Extension: x  Supination: x  Pronation: x                       ADL  Grooming: Stand by assistance (hair care and washing standing at sink)  UE Dressing: Setup  LE Dressing: Minimal assistance (A for TEDS and socks)  Toileting: Minimal assistance (Assist wiping buttocks after BM)  Additional Comments: BATEMAN facilitated pt ADLS this AM. Pt polietly declines shower. Completes UB dressing sitting EOB. Req assist for TEDs and socks. Assessment  Performance deficits / Impairments: Decreased functional mobility ; Decreased ADL status; Decreased ROM; Decreased strength;Decreased safe awareness;Decreased endurance;Decreased balance;Decreased high-level IADLs;Decreased coordination  Prognosis: Good  Discharge Recommendations: Home with assist PRN  Activity Tolerance: Patient limited by fatigue;Treatment limited secondary to medical complications (free text) (nausea)  Activity Tolerance: Nausea/hearburn after taking meds in AM. PM declines tx due to nausea. Safety Devices in place: Yes  Type of devices: Patient at risk for falls;Call light within reach; Left in bed  Equipment Recommendations  Equipment Needed:  (TBD)       Patient Education: OT POC, safety awareness and hand placement with sit<>stand, energy conservation and pacing with functional tasks, possible DME available for ease with ADLs  Patient Goals   Patient goals :  To return to independence  Learner:patient  Method: explanation       Outcome: acknowledged understanding of         Plan  Plan  Times per week: 900/7  Times per day: Twice a day  Current Treatment Recommendations: Strengthening, ROM, Balance Training, Functional Mobility Training, Endurance Training, Pain Management, Safety Education & Training, Patient/Caregiver Education & Training, Equipment Evaluation, Education, & procurement, Self-Care / ADL, Home Management Training  Patient Goals   Patient goals : To return to independence  Short term goals  Time Frame for Short term goals: One Week  Short term goal 1: Pt will complete UB bathing/dressing at Mod I level. Short term goal 2: Pt will complete LB bathing/dressing with Mod A and Good safety using AE as needed. Short term goal 3: Pt will complete toilet transfer and toileting tasks with Min A and Good safety using least restrictive device. Short term goal 4: Pt will tolerate standing for 5+ minutes with 1-2 UE support, SBA, and no LOB while completing a functional task. Short term goal 5: Pt will actively participate in 30 minutes of therapeutic exercise/activity to promote increased independence and safety with self-care and mobility. Long term goals  Time Frame for Long term goals : By Discharge  Long term goal 1: Pt will complete BADL's with Mod I and Good safety using AE as needed. Long term goal 2: Pt will complete functional transfers with Mod I and Good safety using least restrictive device. Long term goal 3: Pt will tolerate standing for 10 minutes with 1-2 UE support and no LOB while completing a functional task. Long term goal 4: Pt will complete light housekeeping/simple meal prep with SBA and Good safety using least restrictive device. Long term goal 5: Pt will V/D at least 3 non-pharmacological pain mgt techniques and 3 EC/WS strategies that she can utilize during home routines.   Timed Code Treatment Minutes: 0 Minutes     09/30/21 1346 09/30/21 1347 09/30/21 1404   OT Individual Minutes   Time In [id number]  --    Time Out 0809 0940  --    Minutes 3 69  --    Minute Variance

## 2021-09-30 NOTE — PROGRESS NOTES
Physical Medicine & Rehabilitation  Progress Note    9/30/2021 11:40 AM     CC: Ambulatory and ADL dysfunction due to bilateral total knee arthroplasty    Subjective:   Positive BM. Less nausea. Participated better with therapy yesterday, pulmonary follow-up appreciated    ROS:  Denies fevers, chills, sweats. No chest pain, palpitations, lightheadedness. Denies coughing, wheezing or shortness of breath. Denies abdominal pain, nausea, diarrhea or constipation. No new areas of joint pain. Denies new areas of numbness or weakness. Denies new anxiety or depression issues. No new skin problems. Rehabilitation:   PT:  Restrictions/Precautions: Weight Bearing, Surgical Protocols, Fall Risk  Implants present? : Metal implants  Other position/activity restrictions: FWBAT RLE/LLE  Right Lower Extremity Weight Bearing: Weight Bearing As Tolerated  Left Lower Extremity Weight Bearing: Weight Bearing As Tolerated   Transfers  Sit to Stand: Contact guard assistance  Stand to sit: Contact guard assistance  Bed to Chair: Contact guard assistance  Stand Pivot Transfers: Contact guard assistance  Comment: slow guarded movements, Pt report increased stiffness. WB Status: FWBAT BLEs  Ambulation 1  Surface: level tile  Device: Rolling Walker  Other Apparatus: O2 (2)  Assistance: Contact guard assistance  Quality of Gait: Very slow & antalgic gait, Wide LUX, pt moves slowly/cautiously with dec bilateral step height and length. Heavy BUEs WB on RW  Gait Deviations: Slow Janet, Increased LUX, Decreased step length, Decreased step height  Distance: 82ft  Comments: Nauseated and requesting to lye back down. OT:  ADL  Equipment Provided: Reacher, Sock aid  Feeding: Modified independent  (Dentures)  Grooming: Stand by assistance (hair care and washing standing at sink)  UE Bathing: Setup (Seated on TTB in shower)  LE Bathing:  Moderate assistance (A for B lower legs/feet and buttocks)  UE Dressing: Setup  LE Dressing: Minimal assistance (A for TEDS and socks)  Toileting: Stand by assistance (Fiona-hygiene in sitting; No BM this AM)  Additional Comments: BATEMAN facilitated pt ADLS this AM. Pt polietly declines shower. Completes UB dressing sitting EOB. Req assist for TEDs and socks. Balance  Sitting Balance: Independent  Standing Balance: Stand by assistance   Standing Balance  Time: AM: 3-4 min, 30 sec, 1 min  Activity: AM: functional mobility, ADLs  Comment: 1-2 UE support, briefly able to stand unsupported during toileting tasks  Functional Mobility  Functional - Mobility Device: Rolling Walker  Activity: To/from bathroom  Assist Level: Stand by assistance  Functional Mobility Comments: Good safety, no LOB, slow pace     Bed mobility  Rolling to Left: Minimal assistance  Rolling to Right: Minimal assistance  Supine to Sit: Minimal assistance (utilized leg )  Sit to Supine: Minimal assistance (utilized leg )  Scooting: Stand by assistance  Comment: completed on mat with wedge and 2 pillows  Transfers  Stand Step Transfers: Stand by assistance  Stand Pivot Transfers: Stand by assistance  Sit to stand: Stand by assistance  Stand to sit: Stand by assistance  Transfer Comments: VC for hand placement standing from EOB. good carryover noted for sitting into w/c. Toilet Transfers  Toilet - Technique: Ambulating  Equipment Used: Raised toilet seat with rails  Toilet Transfer: Stand by assistance  Toilet Transfers Comments: w/RW  Tub Transfers  Tub Transfers Comments: Discussed TTB vs shower chair 2* pt does not have GB and is limited in BLE. OT demo'd transfer and pt verbalized good understanding. Practice tub is much higher than pt's tub at home, but still recommend attempting transfer prior to discharge. Shower Transfers  Shower - Transfer From: Cindy De Leon - Transfer Type: To and From  Shower - Transfer To:  Transfer tub bench  Shower - Technique: Ambulating, Stand pivot  Shower Transfers: Landon Rodriguez Stand by assistance  Shower Transfers Comments: Pt ambulated into shower with CGA and RW; Stand pivot to w/c after shower 2* fatigue, completed transfer with GB and SBA  Wheelchair Bed Transfers  Wheelchair/Bed - Technique: Stand pivot  Equipment Used: Bed, Wheelchair  Level of Asssistance: Minimal assistance  Wheelchair Transfers Comments: Min A to stand from EOB, CGA for pivot with RW      ST:            Objective:  /65   Pulse 65   Temp 98.1 °F (36.7 °C)   Resp 16   Ht 5' 2\" (1.575 m)   Wt 252 lb (114.3 kg)   SpO2 94%   BMI 46.09 kg/m²  I Body mass index is 46.09 kg/m². I   Wt Readings from Last 1 Encounters:   21 252 lb (114.3 kg)      Temp (24hrs), Av.2 °F (36.8 °C), Min:98.1 °F (36.7 °C), Max:98.2 °F (36.8 °C)         GEN: well developed, well nourished, no acute distress  HEENT: Normocephalic atraumatic, EOMI, mucous membranes pink and moist  CV: RRR, no murmurs, rubs or gallops  PULM: CTAB, no rales or rhonchi. Respirations WNL and unlabored  ABD: soft, NT, ND, +BS and equal, no guarding or rebound  NEURO: A&O x3. Sensation intact to light touch. MSK: 4+/5 upper extremity distal lower extremities, knee incisions with Aquacel mild drainage, no erythema  around dressing, no significant drainage no change  EXTREMITIES: No calf tenderness to palpation bilaterally. 1+ edema lower extremities  SKIN: warm dry and intact with good turgor  PSYCH: appropriately interactive. Affect WNL.   Good spirits        Medications   Scheduled Meds:   aspirin  81 mg Oral BID    sennosides-docusate sodium  1 tablet Oral BID    amLODIPine  10 mg Oral Daily    ipratropium-albuterol  1 ampule Inhalation 4x daily    lisinopril  20 mg Oral Daily    polyethylene glycol  17 g Oral Daily     Continuous Infusions:  PRN Meds:.aluminum & magnesium hydroxide-simethicone, ondansetron, oxyCODONE **OR** oxyCODONE, calcium carbonate, magnesium hydroxide, acetaminophen, senna, bisacodyl     Diagnostics:     CBC: Recent Labs     09/30/21  0602   WBC 9.8   RBC 4.34   HGB 13.0   HCT 38.9   MCV 89.7   RDW 13.6        BMP:   Recent Labs     09/30/21  0602      K 4.4      CO2 30   BUN 13   CREATININE 0.61     BNP: No results for input(s): BNP in the last 72 hours. PT/INR: No results for input(s): PROTIME, INR in the last 72 hours. APTT: No results for input(s): APTT in the last 72 hours. CARDIAC ENZYMES: No results for input(s): CKMB, CKMBINDEX, TROPONINT in the last 72 hours. Invalid input(s): CKTOTAL;3  FASTING LIPID PANEL:No results found for: CHOL, HDL, TRIG  LIVER PROFILE: No results for input(s): AST, ALT, ALB, BILIDIR, BILITOT, ALKPHOS in the last 72 hours. I/O (24Hr): No intake or output data in the 24 hours ending 09/30/21 1140    Glu last 24 hour  No results for input(s): POCGLU in the last 72 hours. No results for input(s): CLARITYU, COLORU, PHUR, SPECGRAV, PROTEINU, RBCUA, BLOODU, BACTERIA, NITRU, WBCUA, LEUKOCYTESUR, YEAST, Mitchel Retort in the last 72 hours.         Impression/Plan:   Impaired ADLs, gait, and mobility due to:     1. Bilateral knee osteoarthritis s/p bilateral total knee arthroplasty:  Procedure done 9/21/21.  WBAT to the bilateral lower limbs.  Continue rehab efforts, Follow up with Dr. Manjit Bui as outpatient, , discharge plan 10/5  2. Pain-tylenol and oxycodone as needed.  . Using 10 mg of oxycodone-discussed with patient to begin tapering-agreeable to decrease to every 6 hours-will consider decreasing to 5 over next few days  3. Chronic incontinence of urine-check PVR, pending  4. Nausea/vomiting a.m-somewhat better after bowel movement. - Zofran, questionable secondary to pain meds-decrease as above, reviewed consideration of NicoDerm patch -patient does not want, labs okay  5. Bilateral hand cramps:  Patient reports that she has had these intermittently in the past.  Electrolytes WNL, including magnesium. Heat/ice as needed.    Will DC lidocaine cream to hands, not using, monitor  6. HTN:  On amlodipine, lisinopril  7. Clinical COPD:  Per pulmonology.  On duo-neb 4 times daily-questionable change to aerosol. Milton Bucio need PFTs as outpatient.,  No longer O2 however may need home O2 evaluation on discharge  -per pulmonary-declined BiPAP, reluctant to use oxygen at night to follow with pulmonologist in [place] area  8. KEVIN:  Did not tolerate nocturnal bipap.  Continue nasal cannula oxygen at night (at least 2-3 L to keep O2 sat > 88%).  Will need sleep study as outpatient. 9. Depression, anxiety:  Not currently on medication  10. Morbid obesity:  BMI 46.09  11. Bowel Management: Miralax daily, senokot prn, dulcolax prn, milk of magnesia prn. Last BM 9/26-discussed with nursing-Senokot today, MiraLAX and suppository if no BM, if still no results consider subset enema  12. DVT Prophylaxis:  aspirin BID  13. Internal Medicine for medical management    Meaghan Castro. Katty Del Toro MD       This note is created with the assistance of a speech recognition program.  While intending to generate a document that actually reflects the content of the visit, the document can still have some errors including those of syntax and sound a like substitutions which may escape proof reading.   In such instances, actual meaning can be extrapolated by contextual diversion

## 2021-09-30 NOTE — PROGRESS NOTES
Physical Therapy  Facility/Department: [facility]  Daily Treatment Note  NAME: Ambar Burgos  : 1960  MRN: [id number]    Date of Service: 2021    Discharge Recommendations:  Patient would benefit from continued therapy after discharge        Assessment      PT Education: Home Exercise Program  Patient Education: Encouraged patient to continue knee extension bedside and Quad sets 5 sec hold and 10 reps 2x 's this evening  Activity Tolerance  Activity Tolerance: Patient limited by pain     Patient Diagnosis(es): There were no encounter diagnoses. has a past medical history of Anxiety, Arthritis, Depression, Hypertension, Knee pain, Murmur, PONV (postoperative nausea and vomiting), and Sleep apnea. has a past surgical history that includes Rotator cuff repair (Left); tumor removal; Ulnar tunnel release (Right); skin biopsy; eye surgery (Left, 2018); and knee surgery (Bilateral, 2021). Restrictions  Restrictions/Precautions  Restrictions/Precautions: Weight Bearing, Surgical Protocols, Fall Risk  Required Braces or Orthoses?: No  Implants present? : Metal implants  Lower Extremity Weight Bearing Restrictions  Right Lower Extremity Weight Bearing: Weight Bearing As Tolerated  Left Lower Extremity Weight Bearing: Weight Bearing As Tolerated  Position Activity Restriction  Other position/activity restrictions: FWBAT RLE/LLE  Subjective   General  Chart Reviewed: Yes  Additional Pertinent Hx: pt is a 64 y.o. woman recently admitted to hospital for Bilateral TKA procedure on 21 by Dr. Andreia Garcia. Pt with initial diagnosis of Bilateral knee Degenerative Joint Disease and H/O osteoarthritis, sleep apnea (no machine), and HTN. Admitted to [facility] on 21  Family / Caregiver Present: No  Subjective  Subjective: patient refused therapy in am d/t nausea. 2 attempts to encourage.  pm patient continues to complain of nausea and difficulty with tolerating therapy          Orientation     Cognition Objective      Transfers  Sit to Stand: Contact guard assistance  Stand to sit: Contact guard assistance  Bed to Chair: Contact guard assistance  Stand Pivot Transfers: Contact guard assistance  Comment: slow guarded movements, Pt report increased stiffness. Ambulation 1  Surface: level tile  Device: Rolling Walker  Assistance: Contact guard assistance  Quality of Gait: Very slow & antalgic gait, Wide LUX, pt moves slowly/cautiously with dec bilateral step height and length. Heavy BUEs WB on RW  Distance: 30 feet  Comments: Nauseated and requesting to sit down.   Stairs/Curb  Stairs?: No        Other exercises  Other exercises 1: seated B LE exercises x20 reps with orange tband   Other exercises 2: hamstring  stretch seated BLE  Other exercises 3: Sit<>Stand x 3  Other exercises 4: (B) seated heel slides working on ROM  Other exercises 5: Seated Hip add isometric   PROM RLE (degrees)  RLE General PROM: 8-80 degrees  PROM LLE (degrees)  LLE General PROM: 10-82                    G-Code     OutComes Score                                                     AM-PAC Score             Goals  Short term goals  Time Frame for Short term goals: 1 week  Short term goal 1: pt to demonstrate Bilateral Knee AROM of 90 degrees knee flexion to demo improved joint mechanics for safe functional mobility   Short term goal 2: pt to improve overall BLE strength by 1/2 manual muscle grade to improve strength and safety during functional transfers and mobility  Short term goal 3: pt to improve dynamic standing balance with RW from fair to fair+ to improve safety with functional mobility   Short term goal 4: pt to ambulate 50' & 2 turns with RW and CGA to improve mobility for household distances  Short term goal 5: pt to improve Bilat knee extension AROM to 0 degrees to improve joint mechanics for safe amb and transfers  Short term goal 6: pt to perform all bed mobility on flat bed with or w/o rails and MinAx1 to decrease burden of care  Long term goals  Time Frame for Long term goals : Until d/c  Long term goal 1: pt to demonstrate Bilateral Knee AROM of 105 degrees knee flexion to demo improved joint mechanics for safe functional mobility   Long term goal 2: pt to improve overall BLE strength to 4/5 to improve strength and safety during functional transfers and mobility  Long term goal 3: pt to ambulate 100' with RW Mod I to improve safety and tolerance to amb household distances  Long term goal 4: pt to demo negotiation of 10' ramp with RW and 1 step with SBA to allow for safe home access  Long term goal 5: pt to perform all bed mobility on flat bed with or w/o rails Mod I to decrease burden of care  Long term goal 6: pt to independently perform HEP strengthening and ROM with good technique to demo indepence with self-care routine  Long term goal 7: pt to amb 76' with RW in 2 minutes to demo improved gait speed to improve safety with household ambulation  Patient Goals   Patient goals : to return home safely    Plan    Plan  Times per week: 900 minutes per week of combined PT and OT  Times per day: Twice a day  Plan weeks: 1-2 weeks  Specific instructions for Next Treatment: transfer pt to sitting up in comfortable chair, seated ROM exercises for Bilateral knees. Current Treatment Recommendations: Strengthening, ROM, Balance Training, Functional Mobility Training, Transfer Training, Endurance Training, Gait Training, Stair training, Pain Management, Home Exercise Program, Safety Education & Training, Patient/Caregiver Education & Training, Equipment Evaluation, Education, & procurement  Safety Devices  Type of devices:  All fall risk precautions in place, Call light within reach, Left in bed  Restraints  Initially in place: No     Therapy Time     09/30/21 1126 09/30/21 1359   PT Individual Minutes   Time In  --  1228   Time Out  --  1300   Minutes  --  32   Minute Variance   Variance 60  --    Reason Refusal  (patient refused am therapy) --      Niki Glaser, PTA

## 2021-10-01 PROCEDURE — 94761 N-INVAS EAR/PLS OXIMETRY MLT: CPT

## 2021-10-01 PROCEDURE — 97530 THERAPEUTIC ACTIVITIES: CPT

## 2021-10-01 PROCEDURE — 99232 SBSQ HOSP IP/OBS MODERATE 35: CPT | Performed by: PHYSICAL MEDICINE & REHABILITATION

## 2021-10-01 PROCEDURE — 94640 AIRWAY INHALATION TREATMENT: CPT

## 2021-10-01 PROCEDURE — 97110 THERAPEUTIC EXERCISES: CPT

## 2021-10-01 PROCEDURE — 6370000000 HC RX 637 (ALT 250 FOR IP): Performed by: ORTHOPAEDIC SURGERY

## 2021-10-01 PROCEDURE — 97116 GAIT TRAINING THERAPY: CPT

## 2021-10-01 PROCEDURE — 6370000000 HC RX 637 (ALT 250 FOR IP): Performed by: STUDENT IN AN ORGANIZED HEALTH CARE EDUCATION/TRAINING PROGRAM

## 2021-10-01 PROCEDURE — 6370000000 HC RX 637 (ALT 250 FOR IP): Performed by: PHYSICAL MEDICINE & REHABILITATION

## 2021-10-01 PROCEDURE — 1180000000 HC REHAB R&B

## 2021-10-01 PROCEDURE — 97535 SELF CARE MNGMENT TRAINING: CPT

## 2021-10-01 RX ADMIN — OXYCODONE HYDROCHLORIDE 5 MG: 5 TABLET ORAL at 13:38

## 2021-10-01 RX ADMIN — AMLODIPINE BESYLATE 10 MG: 10 TABLET ORAL at 08:26

## 2021-10-01 RX ADMIN — OXYCODONE HYDROCHLORIDE 5 MG: 5 TABLET ORAL at 22:27

## 2021-10-01 RX ADMIN — ASPIRIN 81 MG: 81 TABLET, COATED ORAL at 20:53

## 2021-10-01 RX ADMIN — ASPIRIN 81 MG: 81 TABLET, COATED ORAL at 08:26

## 2021-10-01 RX ADMIN — DOCUSATE SODIUM 50MG AND SENNOSIDES 8.6MG 1 TABLET: 8.6; 5 TABLET, FILM COATED ORAL at 08:26

## 2021-10-01 RX ADMIN — IPRATROPIUM BROMIDE AND ALBUTEROL SULFATE 1 AMPULE: .5; 3 SOLUTION RESPIRATORY (INHALATION) at 06:57

## 2021-10-01 RX ADMIN — LISINOPRIL 20 MG: 20 TABLET ORAL at 08:26

## 2021-10-01 RX ADMIN — POLYETHYLENE GLYCOL 3350 17 G: 17 POWDER, FOR SOLUTION ORAL at 08:26

## 2021-10-01 ASSESSMENT — PAIN SCALES - GENERAL
PAINLEVEL_OUTOF10: 2
PAINLEVEL_OUTOF10: 0
PAINLEVEL_OUTOF10: 8
PAINLEVEL_OUTOF10: 0
PAINLEVEL_OUTOF10: 2
PAINLEVEL_OUTOF10: 0
PAINLEVEL_OUTOF10: 5
PAINLEVEL_OUTOF10: 0
PAINLEVEL_OUTOF10: 4
PAINLEVEL_OUTOF10: 4

## 2021-10-01 ASSESSMENT — PAIN DESCRIPTION - ORIENTATION: ORIENTATION: RIGHT;LEFT

## 2021-10-01 ASSESSMENT — PAIN DESCRIPTION - PAIN TYPE: TYPE: ACUTE PAIN

## 2021-10-01 ASSESSMENT — PAIN DESCRIPTION - LOCATION: LOCATION: LEG

## 2021-10-01 NOTE — PROGRESS NOTES
[address]     Date: 10/1/21  Patient Name: Stephy Abel       Room: 3677/9380-57  MRN: [id number]  Account: [de-identified]   : 1960  (64 y.o.) Gender: female   Patient Height Height: 5' 2\" (157.5 cm)  Patient Weight 252 lb (114.3 kg)     DME Order - Transfer Tub Bench to allow safe transfer into Tub shower at home.   She is unable to step over the bathtub wall related to her Bilateral Total Knee Replacements      Electronically signed by Salome Cox OT on 10/1/21 at 1:52 PM EDT

## 2021-10-01 NOTE — PROGRESS NOTES
BRONCHOSPASM/BRONCHOCONSTRICTION     [x]         IMPROVE AERATION/BREATH SOUNDS  [x]   ADMINISTER BRONCHODILATOR THERAPY AS APPROPRIATE  [x]   ASSESS BREATH SOUNDS  []   IMPLEMENT AEROSOL/MDI PROTOCOL  [x]   PATIENT EDUCATION AS NEEDED    PATIENT REFUSES TO WEAR BIPAP     [x] Risks and benefits explained to patient   [x] Patient refuses to wear Bipap stating \"I don't use that machine. \"  [x] Patient verbalizes understanding of information presented.

## 2021-10-01 NOTE — PROGRESS NOTES
Aiden Gracia was evaluated today and a DME order was entered for a bath/shower seat because she requires this to successfully complete daily living tasks of bathing, personal cares, grooming and hygiene. A a\RC is necessary due to the patient's unsteady gait, lower extremity weakness, and bilateral knee arthroplasty; The need for this equipment was discussed with the patient and she understands and is in agreement.

## 2021-10-01 NOTE — PLAN OF CARE
Problem: Falls - Risk of:  Goal: Will remain free from falls  Description: Will remain free from falls  10/1/2021 0130 by Leena Garcia RN  Outcome: Ongoing  9/30/2021 1439 by Kolby Palmer RN  Outcome: Ongoing  Note: Patient remains free of falls and injuries throughout shift. Bed remains in the lowest position, wheels locked, call light and bedside table are within reach. Goal: Absence of physical injury  Description: Absence of physical injury  Outcome: Ongoing     Problem: Skin Integrity:  Goal: Will show no infection signs and symptoms  Description: Will show no infection signs and symptoms  10/1/2021 0130 by Leena Garcia RN  Outcome: Ongoing  9/30/2021 1439 by Kolby Palmer RN  Outcome: Ongoing  Note: No signs of increased skin or tissue breakdown is noted. See Head to Toe/LDA assessments in flowsheets. Goal: Absence of new skin breakdown  Description: Absence of new skin breakdown  Outcome: Ongoing     Problem: Pain:  Goal: Pain level will decrease  Description: Pain level will decrease  10/1/2021 0130 by Leena Garcia RN  Outcome: Ongoing  9/30/2021 1439 by Kolby Palmer RN  Outcome: Ongoing  Note: Patient's pain is well controlled with current regimen. See MAR.    Goal: Control of acute pain  Description: Control of acute pain  Outcome: Ongoing  Goal: Control of chronic pain  Description: Control of chronic pain  Outcome: Ongoing     Problem: Musculor/Skeletal Functional Status  Goal: Highest potential functional level  Outcome: Ongoing  Goal: Absence of falls  Outcome: Ongoing     Problem: Nutrition  Goal: Optimal nutrition therapy  Outcome: Ongoing

## 2021-10-01 NOTE — CARE COORDINATION
Met with pt and review home health list. Pt was not familiar with any home health and was comfortable with star rating. Review of home health services in [facility]. . Referral sent to [facility]. Call from Brigida Hanson at [facility]. They are out of network and not able to accept. Review of list; [facility]; 4.5 stars; referral sent.

## 2021-10-01 NOTE — CARE COORDINATION
Order for shower transfer bench faxed to [facility]. Follow up call to Shila at [facility] confirmed receiving and anticipates no concerns.

## 2021-10-01 NOTE — PROGRESS NOTES
Physical Therapy  Facility/Department: [facility]  Daily Treatment Note  NAME: Brittaney Casiano  : 1960  MRN: [id number]    Date of Service: 10/1/2021    Discharge Recommendations:  Patient would benefit from continued therapy after discharge   PT Equipment Recommendations  Equipment Needed: Yes  Mobility Devices: Vaishnavi Hnuter: Rolling  Other: Need for RW TBD, pt has a 4WW but may require a more stable AD upon d/c    Assessment   Body structures, Functions, Activity limitations: Decreased functional mobility ; Decreased ROM; Decreased strength;Decreased endurance;Decreased balance; Increased pain  Treatment Diagnosis: impaired functional mobility secondary to Bilateral TKA procedure  Specific instructions for Next Treatment: transfer pt to sitting up in comfortable chair, seated ROM exercises for Bilateral knees. History: H/O HTN, Sleep apnea, current smoker, depression/anxiety. REQUIRES PT FOLLOW UP: Yes  Activity Tolerance  Activity Tolerance: Patient limited by fatigue;Patient limited by pain     Patient Diagnosis(es): There were no encounter diagnoses. has a past medical history of Anxiety, Arthritis, Depression, Hypertension, Knee pain, Murmur, PONV (postoperative nausea and vomiting), and Sleep apnea. has a past surgical history that includes Rotator cuff repair (Left); tumor removal; Ulnar tunnel release (Right); skin biopsy; eye surgery (Left, 2018); and knee surgery (Bilateral, 2021). Restrictions  Restrictions/Precautions  Restrictions/Precautions: Weight Bearing, Surgical Protocols, Fall Risk  Required Braces or Orthoses?: No  Implants present? : Metal implants  Lower Extremity Weight Bearing Restrictions  Right Lower Extremity Weight Bearing: Weight Bearing As Tolerated  Left Lower Extremity Weight Bearing: Weight Bearing As Tolerated  Position Activity Restriction  Other position/activity restrictions: FWBAT RLE/LLE  Subjective   General  Chart Reviewed:  Yes  Additional Pertinent Hx: pt is a 64 y.o. woman recently admitted to hospital for Bilateral TKA procedure on 9/21/21 by Dr. Kareen Cody. Pt with initial diagnosis of Bilateral knee Degenerative Joint Disease and H/O osteoarthritis, sleep apnea (no machine), and HTN. Admitted to [facility] on 9/24/21  Response To Previous Treatment: Patient with no complaints from previous session. Family / Caregiver Present: No  Referring Practitioner: Dr. Zach Sawyer  Subjective  Subjective: patient reporting no nausea at this time. stated did not sleep well at all. agreeable to therapy as tolerated. Pain Screening  Patient Currently in Pain: Yes  Vital Signs  Patient Currently in Pain: Yes       Orientation  Orientation  Overall Orientation Status: Within Functional Limits  Objective      Transfers  Sit to Stand: Stand by assistance  Stand to sit: Stand by assistance  Bed to Chair: Stand by assistance  Stand Pivot Transfers: Stand by assistance  Comment: slow guarded movements,  Ambulation  Ambulation?: Yes  WB Status: FWBAT BLEs  Ambulation 1  Surface: outdoors  Device: Rolling Walker  Assistance: Stand by assistance  Quality of Gait: Very slow & antalgic gait, Wide LUX, pt moves slowly/cautiously with dec bilateral step height and length. Heavy BUEs WB on RW  Gait Deviations: Slow Janet; Increased LUX; Decreased step length;Decreased step height  Distance: 75ft x2  Comments: patient tolerance to fatigue reported  Stairs/Curb  Stairs?: Yes  Stairs  # Steps : 2  Stairs Height: 6\"  Rails: None  Device: Rolling walker  Assistance: Minimal assistance  Comment: box step utilized with RW over top for stability for patient. No LOB noted.  Increased nausae         Other exercises  Other exercises?: Yes  Other exercises 1: sit to stand x4 from various surfaces SBA      Goals  Short term goals  Time Frame for Short term goals: 1 week  Short term goal 1: pt to demonstrate Bilateral Knee AROM of 90 degrees knee flexion to demo improved joint mechanics for safe functional mobility   Short term goal 2: pt to improve overall BLE strength by 1/2 manual muscle grade to improve strength and safety during functional transfers and mobility  Short term goal 3: pt to improve dynamic standing balance with RW from fair to fair+ to improve safety with functional mobility   Short term goal 4: pt to ambulate 48' & 2 turns with RW and CGA to improve mobility for household distances  Short term goal 5: pt to improve Bilat knee extension AROM to 0 degrees to improve joint mechanics for safe amb and transfers  Short term goal 6: pt to perform all bed mobility on flat bed with or w/o rails and MinAx1 to decrease burden of care  Long term goals  Time Frame for Long term goals : Until d/c  Long term goal 1: pt to demonstrate Bilateral Knee AROM of 105 degrees knee flexion to demo improved joint mechanics for safe functional mobility   Long term goal 2: pt to improve overall BLE strength to 4/5 to improve strength and safety during functional transfers and mobility  Long term goal 3: pt to ambulate 100' with RW Mod I to improve safety and tolerance to amb household distances  Long term goal 4: pt to demo negotiation of 10' ramp with RW and 1 step with SBA to allow for safe home access  Long term goal 5: pt to perform all bed mobility on flat bed with or w/o rails Mod I to decrease burden of care  Long term goal 6: pt to independently perform HEP strengthening and ROM with good technique to demo indepence with self-care routine  Long term goal 7: pt to amb 76' with RW in 2 minutes to demo improved gait speed to improve safety with household ambulation  Patient Goals   Patient goals : to return home safely    Plan    Plan  Times per week: 900 minutes per week of combined PT and OT  Times per day: Twice a day  Plan weeks: 1-2 weeks  Specific instructions for Next Treatment: transfer pt to sitting up in comfortable chair, seated ROM exercises for Bilateral knees.    Current Treatment Recommendations: Strengthening, ROM, Balance Training, Functional Mobility Training, Transfer Training, Endurance Training, Gait Training, Stair training, Pain Management, Home Exercise Program, Safety Education & Training, Patient/Caregiver Education & Training, Equipment Evaluation, Education, & procurement  Safety Devices  Type of devices:  All fall risk precautions in place, Call light within reach, Left in bed  Restraints  Initially in place: No        10/01/21 1258   PT Individual Minutes   Time In [facility]   Time Out 1254   Minutes 33     Electronically signed by Edda Crowder PTA on 10/1/21 at 1:06 PM EDT

## 2021-10-01 NOTE — PROGRESS NOTES
Physical Therapy  Facility/Department: [facility]  Daily Treatment Note  NAME: Aiden Gracia  : 1960  MRN: [id number]    Date of Service: 10/1/2021    Discharge Recommendations:  Patient would benefit from continued therapy after discharge        Assessment      PT Education: Disease Specific Education; Functional Mobility Training  Patient Education: BLE TKR knee ROM Therex to improve overall flexion extension  Activity Tolerance  Activity Tolerance: Patient limited by fatigue;Patient limited by pain     Patient Diagnosis(es): There were no encounter diagnoses. has a past medical history of Anxiety, Arthritis, Depression, Hypertension, Knee pain, Murmur, PONV (postoperative nausea and vomiting), and Sleep apnea. has a past surgical history that includes Rotator cuff repair (Left); tumor removal; Ulnar tunnel release (Right); skin biopsy; eye surgery (Left, 2018); and knee surgery (Bilateral, 2021). Restrictions  Restrictions/Precautions  Restrictions/Precautions: Weight Bearing, Surgical Protocols, Fall Risk  Required Braces or Orthoses?: No  Implants present? : Metal implants  Lower Extremity Weight Bearing Restrictions  Right Lower Extremity Weight Bearing: Weight Bearing As Tolerated  Left Lower Extremity Weight Bearing: Weight Bearing As Tolerated  Position Activity Restriction  Other position/activity restrictions: FWBAT RLE/LLE  Subjective   General  Chart Reviewed: Yes  Additional Pertinent Hx: pt is a 64 y.o. woman recently admitted to hospital for Bilateral TKA procedure on 21 by Dr. Italo Rodgers. Pt with initial diagnosis of Bilateral knee Degenerative Joint Disease and H/O osteoarthritis, sleep apnea (no machine), and HTN. Admitted to [facility] on 21  Family / Caregiver Present: No  Subjective  Subjective: patient reporting no nausea at this time. stated did not sleep well at all. agreeable to therapy as tolerated. patient reporting \"freezing\" today.           Orientation Cognition      Objective   Bed mobility  Bridging: Minimal assistance  Rolling to Left: Unable to assess  Rolling to Right: Unable to assess  Supine to Sit: Minimal assistance  Sit to Supine: Minimal assistance  Scooting: Minimal assistance  Comment: self assisted legs individually into bed with use of belt looped around feet; patient with tight hip flexors on R/hip does not allow patient to extend knee fully to lay flat on bed. Transfers  Sit to Stand: Stand by assistance  Stand to sit: Stand by assistance  Bed to Chair: Stand by assistance  Comment: slow guarded movements,  Ambulation  WB Status: FWBAT BLEs  Ambulation 1  Surface: level tile  Device: Rolling Walker  Assistance: Stand by assistance  Quality of Gait: Very slow & antalgic gait, Wide LUX, pt moves slowly/cautiously with dec bilateral step height and length.  Heavy BUEs WB on RW  Distance: 85 feet  Comments: patient tolerance to fatigue reported  Stairs/Curb  Stairs?: No        Other exercises  Other exercises 1: seated B LE exercises x20 reps with orange tband right/ lime left  Other exercises 2: hamstring  stretch seated BLE  Other exercises 3: Supine B LE therex TKR 10-20 reps to tolerance A-AAROM- self assisted techniques taught with belt looped  Other exercises 4: (B) seated heel slides working on ROM   PROM RLE (degrees)  RLE General PROM: AAROM in seated EOM 8-82  R Knee Extension 0: lacking full extension  AROM RLE (degrees)  RLE General AROM: seated EOM 10-75  R Knee Extension 0: lacking full extension  PROM LLE (degrees)  LLE General PROM: AAROM in seated EOM 6-90  L Knee Extension 0: lacking full extension  AROM LLE (degrees)  LLE General AROM: seated EOM -75  L Knee Extension 0: lacking full extension                    G-Code     OutComes Score                                                     AM-PAC Score             Goals  Short term goals  Time Frame for Short term goals: 1 week  Short term goal 1: pt to demonstrate Bilateral Knee AROM of 90 degrees knee flexion to demo improved joint mechanics for safe functional mobility   Short term goal 2: pt to improve overall BLE strength by 1/2 manual muscle grade to improve strength and safety during functional transfers and mobility  Short term goal 3: pt to improve dynamic standing balance with RW from fair to fair+ to improve safety with functional mobility   Short term goal 4: pt to ambulate 48' & 2 turns with RW and CGA to improve mobility for household distances  Short term goal 5: pt to improve Bilat knee extension AROM to 0 degrees to improve joint mechanics for safe amb and transfers  Short term goal 6: pt to perform all bed mobility on flat bed with or w/o rails and MinAx1 to decrease burden of care  Long term goals  Time Frame for Long term goals : Until d/c  Long term goal 1: pt to demonstrate Bilateral Knee AROM of 105 degrees knee flexion to demo improved joint mechanics for safe functional mobility   Long term goal 2: pt to improve overall BLE strength to 4/5 to improve strength and safety during functional transfers and mobility  Long term goal 3: pt to ambulate 100' with RW Mod I to improve safety and tolerance to amb household distances  Long term goal 4: pt to demo negotiation of 10' ramp with RW and 1 step with SBA to allow for safe home access  Long term goal 5: pt to perform all bed mobility on flat bed with or w/o rails Mod I to decrease burden of care  Long term goal 6: pt to independently perform HEP strengthening and ROM with good technique to demo indepence with self-care routine  Long term goal 7: pt to amb 76' with RW in 2 minutes to demo improved gait speed to improve safety with household ambulation  Patient Goals   Patient goals : to return home safely    Plan    Plan  Times per week: 900 minutes per week of combined PT and OT  Times per day: Twice a day  Plan weeks: 1-2 weeks  Specific instructions for Next Treatment: transfer pt to sitting up in comfortable chair, seated ROM exercises for Bilateral knees. Current Treatment Recommendations: Strengthening, ROM, Balance Training, Functional Mobility Training, Transfer Training, Endurance Training, Gait Training, Stair training, Pain Management, Home Exercise Program, Safety Education & Training, Patient/Caregiver Education & Training, Equipment Evaluation, Education, & procurement  Safety Devices  Type of devices:  All fall risk precautions in place, Call light within reach, Left in bed  Restraints  Initially in place: No     Therapy Time   Individual Concurrent Group Co-treatment   Time In 1000         Time Out 1110         Minutes Pernell 113, PTA

## 2021-10-01 NOTE — PROGRESS NOTES
Physical Medicine & Rehabilitation  Progress Note    10/1/2021 11:50 AM     CC: Ambulatory and ADL dysfunction due to bilateral total knee arthroplasty    Subjective:   Positive BM. Less nausea. Participated better with therapy yesterday, pulmonary follow-up appreciated    ROS:  Denies fevers, chills, sweats. No chest pain, palpitations, lightheadedness. Denies coughing, wheezing or shortness of breath. Denies abdominal pain, nausea, diarrhea or constipation. No new areas of joint pain. Denies new areas of numbness or weakness. Denies new anxiety or depression issues. No new skin problems. Rehabilitation:   PT:  Restrictions/Precautions: Weight Bearing, Surgical Protocols, Fall Risk  Implants present? : Metal implants  Other position/activity restrictions: FWBAT RLE/LLE  Right Lower Extremity Weight Bearing: Weight Bearing As Tolerated  Left Lower Extremity Weight Bearing: Weight Bearing As Tolerated   Transfers  Sit to Stand: Stand by assistance  Stand to sit: Stand by assistance  Bed to Chair: Stand by assistance  Stand Pivot Transfers: Contact guard assistance  Comment: slow guarded movements,  WB Status: FWBAT BLEs  Ambulation 1  Surface: level tile  Device: Rolling Walker  Other Apparatus: O2 (2)  Assistance: Stand by assistance  Quality of Gait: Very slow & antalgic gait, Wide LUX, pt moves slowly/cautiously with dec bilateral step height and length.  Heavy BUEs WB on RW  Gait Deviations: Slow Janet, Increased LUX, Decreased step length, Decreased step height  Distance: 85 feet  Comments: patient tolerance to fatigue reported          OT:  ADL  Equipment Provided: Reacher, Sock aid, Long-handled sponge  Feeding: Modified independent   Grooming: Stand by assistance (standing sinkside.)  UE Bathing: Setup (seated on shower bench )  LE Bathing: Minimal assistance (Assist with buttocks, CGA for analia, LHS for LEs/feet. )  UE Dressing: Minimal assistance (Assist with pulling down bra due to fatigue/hand cramping. )  LE Dressing: Minimal assistance (Assist with pulling underwear/shorts over hips, osorio B TEDs)  Toileting: Stand by assistance (clothing mgmt down only, hygiene while seated following voi )  Additional Comments: BATEMAN facilitated pt ADLS this AM. Pt polietly declines shower. Completes UB dressing sitting EOB. Req assist for TEDs and socks. Balance  Sitting Balance: Independent  Standing Balance: Stand by assistance   Standing Balance  Time: 1-2 min, ~1 min, 3-4 min   Activity: functional mobility, ADLs  Comment: 1-2 UE support, briefly able to stand unsupported during toileting tasks  Functional Mobility  Functional - Mobility Device: Rolling Walker  Activity: Transport items, To/from bathroom  Assist Level: Stand by assistance  Functional Mobility Comments: Good safety, no LOB, slow pace     Bed mobility  Bridging: Minimal assistance  Rolling to Left: Unable to assess  Rolling to Right: Unable to assess  Supine to Sit: Minimal assistance  Sit to Supine: Minimal assistance  Scooting: Minimal assistance  Comment: self assisted legs individually into bed with use of belt looped around feet; patient with tight hip flexors on R/hip does not allow patient to extend knee fully to lay flat on bed. Transfers  Stand Step Transfers: Stand by assistance  Stand Pivot Transfers: Stand by assistance  Sit to stand: Contact guard assistance  Stand to sit: Stand by assistance  Transfer Comments: VC for hand placement standing from EOB. good carryover noted for sitting into w/c. Toilet Transfers  Toilet - Technique: Ambulating  Equipment Used: Raised toilet seat with rails  Toilet Transfer: Stand by assistance  Toilet Transfers Comments: w/RW  Tub Transfers  Tub Transfers Comments: Discussed TTB vs shower chair 2* pt does not have GB and is limited in BLE. OT demo'd transfer and pt verbalized good understanding.   Practice tub is much higher than pt's tub at home, but still recommend attempting transfer prior to discharge. Shower Transfers  Shower - Transfer From: Willie Wiley (RW)  Shower - Transfer Type: To and From  Shower - Transfer To: Transfer tub bench  Shower - Technique: Ambulating  Shower Transfers: Contact Guard, Stand by assistance  Shower Transfers Comments: Pt ambulated into shower with CGA and RW; Stand pivot to w/c after shower 2* fatigue, completed transfer with GB and SBA  Wheelchair Bed Transfers  Wheelchair/Bed - Technique: Stand pivot  Equipment Used: Bed, Wheelchair  Level of Asssistance: Minimal assistance  Wheelchair Transfers Comments: Min A to stand from EOB, CGA for pivot with RW      ST:            Objective:  /63   Pulse 64   Temp 98.1 °F (36.7 °C)   Resp 18   Ht 5' 2\" (1.575 m)   Wt 252 lb (114.3 kg)   SpO2 90%   BMI 46.09 kg/m²  I Body mass index is 46.09 kg/m². I   Wt Readings from Last 1 Encounters:   21 252 lb (114.3 kg)      Temp (24hrs), Av.3 °F (36.8 °C), Min:98.1 °F (36.7 °C), Max:98.4 °F (36.9 °C)         GEN: well developed, well nourished, no acute distress  HEENT: Normocephalic atraumatic, EOMI, mucous membranes pink and moist  CV: RRR, no murmurs, rubs or gallops  PULM: CTAB, no rales or rhonchi. Respirations WNL and unlabored  ABD: soft, NT, ND, +BS and equal, no guarding or rebound  NEURO: A&O x3. Sensation intact to light touch. MSK: 4+/5 upper extremity distal lower extremities, knee incisions with Aquacel , no erythema  around dressing, no significant drainage no change  EXTREMITIES: No calf tenderness to palpation bilaterally. 1+ edema lower extremities  SKIN: warm dry and intact with good turgor  PSYCH: appropriately interactive. Affect WNL.   Good spirits        Medications   Scheduled Meds:   aspirin  81 mg Oral BID    sennosides-docusate sodium  1 tablet Oral BID    amLODIPine  10 mg Oral Daily    ipratropium-albuterol  1 ampule Inhalation 4x daily    lisinopril  20 mg Oral Daily    polyethylene glycol  17 g Oral Daily     Continuous Infusions:  PRN Meds:.aluminum & magnesium hydroxide-simethicone, ondansetron, oxyCODONE **OR** oxyCODONE, calcium carbonate, magnesium hydroxide, acetaminophen, senna, bisacodyl     Diagnostics:     CBC:   Recent Labs     09/30/21  0602   WBC 9.8   RBC 4.34   HGB 13.0   HCT 38.9   MCV 89.7   RDW 13.6        BMP:   Recent Labs     09/30/21  0602      K 4.4      CO2 30   BUN 13   CREATININE 0.61     BNP: No results for input(s): BNP in the last 72 hours. PT/INR: No results for input(s): PROTIME, INR in the last 72 hours. APTT: No results for input(s): APTT in the last 72 hours. CARDIAC ENZYMES: No results for input(s): CKMB, CKMBINDEX, TROPONINT in the last 72 hours. Invalid input(s): CKTOTAL;3  FASTING LIPID PANEL:No results found for: CHOL, HDL, TRIG  LIVER PROFILE: No results for input(s): AST, ALT, ALB, BILIDIR, BILITOT, ALKPHOS in the last 72 hours. I/O (24Hr): Intake/Output Summary (Last 24 hours) at 10/1/2021 1150  Last data filed at 10/1/2021 0830  Gross per 24 hour   Intake 880 ml   Output --   Net 880 ml       Glu last 24 hour  No results for input(s): POCGLU in the last 72 hours. No results for input(s): CLARITYU, COLORU, PHUR, SPECGRAV, PROTEINU, RBCUA, BLOODU, BACTERIA, NITRU, WBCUA, LEUKOCYTESUR, YEAST, Kavitha Slay in the last 72 hours.         Impression/Plan:   Impaired ADLs, gait, and mobility due to:     1. Bilateral knee osteoarthritis s/p bilateral total knee arthroplasty:  Procedure done 9/21/21.  WBAT to the bilateral lower limbs.  Continue rehab efforts, Follow up with Dr. Zane Resendez as outpatient, , discharge plan 10/5, patient would prefer to see Dr. Zane Resendez prior to discharge  2. Pain-tylenol and oxycodone as needed.  . Using 10 mg of oxycodone-discussed with patient to begin tapering-agreeable to decrease to every 6 hours-not use this morning, will decrease to 5 mg every 6 hours  3. Chronic incontinence of urine-check PVR, pending  4.  Nausea/vomiting a.m- better after bowel movement and questionable decrease in pain meds. -  reviewed consideration of NicoDerm patch -patient does not want, labs okay  5. Bilateral hand cramps:  Patient reports that she has had these intermittently in the past.  Electrolytes WNL, including magnesium. Heat/ice as needed. Will DC lidocaine cream to hands, not using, monitor  6. HTN:  On amlodipine, lisinopril  7. Clinical COPD:  Per pulmonology.  On duo-neb 4 times daily-questionable change to aerosol. Siri Monge need PFTs as outpatient.,  No longer O2 however may need home O2 evaluation on discharge  -per pulmonary-declined BiPAP, reluctant to use oxygen at night to follow with pulmonologist in [place] area  8. KEVIN:  Did not tolerate nocturnal bipap.  Continue nasal cannula oxygen at night (at least 2-3 L to keep O2 sat > 88%).  Will need sleep study as outpatient. 9. Depression, anxiety:  Not currently on medication  10. Morbid obesity:  BMI 46.09  11. Bowel Management: Miralax daily, senokot prn, dulcolax prn, milk of magnesia prn. Last BM 9/30-  12. DVT Prophylaxis:  aspirin BID-length as per Ortho  13. Internal Medicine for medical management    Atul Santa. Kavin Mead MD       This note is created with the assistance of a speech recognition program.  While intending to generate a document that actually reflects the content of the visit, the document can still have some errors including those of syntax and sound a like substitutions which may escape proof reading.   In such instances, actual meaning can be extrapolated by contextual diversion

## 2021-10-01 NOTE — PROGRESS NOTES
BRONCHOSPASM/BRONCHOCONSTRICTION     [x]         IMPROVE AERATION/BREATH SOUNDS  [x]   ADMINISTER BRONCHODILATOR THERAPY AS APPROPRIATE  [x]   ASSESS BREATH SOUNDS  [x]   IMPLEMENT AEROSOL/MDI PROTOCOL  [x]   PATIENT EDUCATION AS NEEDED    PROVIDE ADEQUATE OXYGENATION WITH ACCEPTABLE SP02/ABG'S    [x]  IDENTIFY APPROPRIATE OXYGEN THERAPY  [x]   MONITOR SP02/ABG'S AS NEEDED   [x]   PATIENT EDUCATION AS NEEDED    Pt currently off oxygen on room air SpO2 90% pt stated she does not wear oxygen at home.  Breath sounds diminished through out anteriorly

## 2021-10-01 NOTE — PLAN OF CARE
Problem: Falls - Risk of:  Goal: Will remain free from falls  Outcome: Ongoing     Problem: Falls - Risk of:  Goal: Absence of physical injury  Outcome: Ongoing     Problem: Skin Integrity:  Goal: Will show no infection signs and symptoms  Outcome: Ongoing     Problem: Skin Integrity:  Goal: Absence of new skin breakdown  Outcome: Ongoing     Problem: Pain:  Goal: Pain level will decrease  Outcome: Ongoing     Problem: Pain:  Goal: Control of acute pain  Outcome: Ongoing     Problem: Musculor/Skeletal Functional Status  Goal: Highest potential functional level  Outcome: Ongoing     Problem: Musculor/Skeletal Functional Status  Goal: Absence of falls  Outcome: Ongoing     Problem: Nutrition  Goal: Optimal nutrition therapy  Outcome: Ongoing

## 2021-10-01 NOTE — PROGRESS NOTES
[facility]    ACUTE REHABILITATION OCCUPATIONAL THERAPY  DAILY NOTE    Date: 10/1/21  Patient Name: Chico Saldaña      Room: 8122/5011-44    MRN: [id number]   : 1960  (64 y.o.)  Gender: female   Referring Practitioner: Troy Rodgers MD/Dr. Rell Felix  Diagnosis: B TKA 21       Restrictions  Restrictions/Precautions: Weight Bearing, Surgical Protocols, Fall Risk  Implants present? : Metal implants  Other position/activity restrictions: FWBAT RLE/LLE  Right Lower Extremity Weight Bearing: Weight Bearing As Tolerated  Left Lower Extremity Weight Bearing: Weight Bearing As Tolerated  Required Braces or Orthoses?: No  Equipment Used: Bed, Wheelchair    Subjective  Subjective: \"I actually don't feel too bad right now\"   Comments: Pt pleasant and cooperative. Patient Currently in Pain: Yes  Pain Level: 4  Restrictions/Precautions: Weight Bearing;Surgical Protocols; Fall Risk  Overall Orientation Status: Within Functional Limits     Pain Assessment  Pain Assessment: 0-10  Pain Level: 4    Objective  Cognition  Overall Cognitive Status: WFL  Perception  Overall Perceptual Status: WFL  Balance  Sitting Balance: Independent  Standing Balance: Stand by assistance  Bed mobility  Supine to Sit: Stand by assistance  Sit to Supine: Minimal assistance (to bring RLE over EOB)  Scooting: Stand by assistance  Transfers  Sit to stand: Contact guard assistance  Stand to sit: Stand by assistance  Standing Balance  Time: 1-2 min, ~1 min, 3-4 min, <1 min. Standing tasks defered in PM due to increase in pain to 7/10. Activity: functional mobility, ADLs  Functional Mobility  Functional - Mobility Device: Rolling Walker  Activity: Transport items; To/from bathroom  Assist Level: Stand by assistance  Toilet Transfers  Toilet - Technique: Ambulating  Equipment Used: Raised toilet seat with rails  Toilet Transfer: Stand by assistance  Shower Transfers  Shower - Transfer From: Latasha Juarez (VIVEK)  Shower - Transfer Type:  To and From  Shower - Transfer To: Transfer tub bench  Shower - Technique: Ambulating  Shower Transfers: Contact Guard;Stand by assistance  Tub Transfers  Tub Transfers Comments: further discussed TTB, notified OTR for input of order to prepare for discharge 10/5. Type of ROM/Therapeutic Exercise  Type of ROM/Therapeutic Exercise: Free weights (3#, x20 reps. )  Comment: Pt engaged in UB ex's to promote overall strength and endurance for functional trasnfers/tasks. Exercises  Scapular Protraction: x  Scapular Retraction: x  Shoulder Flexion: x  Shoulder Extension: x  Horizontal ABduction: x  Horizontal ADduction: x  Elbow Flexion: x  Elbow Extension: x        ADL  Equipment Provided: Reacher;Sock aid;Long-handled sponge  Feeding: Modified independent   Grooming: Stand by assistance (standing sinkside.)  UE Bathing: Setup (seated on shower bench )  LE Bathing: Minimal assistance (Assist with buttocks, CGA for analia, LHS for LEs/feet. )  UE Dressing: Minimal assistance (Assist with pulling down bra due to fatigue/hand cramping. )  LE Dressing: Minimal assistance (Assist with pulling underwear/shorts over hips, osorio B TEDs)  Toileting: Stand by assistance (clothing mgmt down only, hygiene while seated following void)        Assessment  Performance deficits / Impairments: Decreased functional mobility ; Decreased ADL status; Decreased ROM; Decreased strength;Decreased safe awareness;Decreased endurance;Decreased balance;Decreased high-level IADLs;Decreased coordination  Prognosis: Good  Discharge Recommendations: Home with assist PRN  Activity Tolerance: Patient Tolerated treatment well, increase in pain for PM session. Safety Devices in place: Yes  Type of devices: Patient at risk for falls;Call light within reach; Left in bed (ice packs provided for B knees)   Equipment Recommendations  Equipment Needed:  (TBD)        Plan  Plan  Times per week: 900/7  Times per day: Twice a day  Current Treatment Recommendations: Strengthening, ROM, Balance Training, Functional Mobility Training, Endurance Training, Pain Management, Safety Education & Training, Patient/Caregiver Education & Training, Equipment Evaluation, Education, & procurement, Self-Care / ADL, Home Management Training  Patient Goals   Patient goals : To return to independence  Short term goals  Time Frame for Short term goals: One Week  Short term goal 1: Pt will complete UB bathing/dressing at Mod I level. Short term goal 2: Pt will complete LB bathing/dressing with Mod A and Good safety using AE as needed. Short term goal 3: Pt will complete toilet transfer and toileting tasks with Min A and Good safety using least restrictive device. Short term goal 4: Pt will tolerate standing for 5+ minutes with 1-2 UE support, SBA, and no LOB while completing a functional task. Short term goal 5: Pt will actively participate in 30 minutes of therapeutic exercise/activity to promote increased independence and safety with self-care and mobility. Long term goals  Time Frame for Long term goals : By Discharge  Long term goal 1: Pt will complete BADL's with Mod I and Good safety using AE as needed. Long term goal 2: Pt will complete functional transfers with Mod I and Good safety using least restrictive device. Long term goal 3: Pt will tolerate standing for 10 minutes with 1-2 UE support and no LOB while completing a functional task. Long term goal 4: Pt will complete light housekeeping/simple meal prep with SBA and Good safety using least restrictive device. Long term goal 5: Pt will V/D at least 3 non-pharmacological pain mgt techniques and 3 EC/WS strategies that she can utilize during home routines.         10/01/21 0934 10/01/21 1435   OT Individual Minutes   Time In [id number]   Time Out 0928 1332   Minutes 53 29     Electronically signed by NICOLÁS Dupree on 10/1/21 at 2:46 PM EDT

## 2021-10-02 PROCEDURE — 97116 GAIT TRAINING THERAPY: CPT

## 2021-10-02 PROCEDURE — 97535 SELF CARE MNGMENT TRAINING: CPT

## 2021-10-02 PROCEDURE — 6370000000 HC RX 637 (ALT 250 FOR IP): Performed by: STUDENT IN AN ORGANIZED HEALTH CARE EDUCATION/TRAINING PROGRAM

## 2021-10-02 PROCEDURE — 51798 US URINE CAPACITY MEASURE: CPT

## 2021-10-02 PROCEDURE — 94761 N-INVAS EAR/PLS OXIMETRY MLT: CPT

## 2021-10-02 PROCEDURE — 99232 SBSQ HOSP IP/OBS MODERATE 35: CPT | Performed by: PHYSICAL MEDICINE & REHABILITATION

## 2021-10-02 PROCEDURE — 97530 THERAPEUTIC ACTIVITIES: CPT

## 2021-10-02 PROCEDURE — 1180000000 HC REHAB R&B

## 2021-10-02 PROCEDURE — 99232 SBSQ HOSP IP/OBS MODERATE 35: CPT | Performed by: INTERNAL MEDICINE

## 2021-10-02 PROCEDURE — 97110 THERAPEUTIC EXERCISES: CPT

## 2021-10-02 PROCEDURE — 6370000000 HC RX 637 (ALT 250 FOR IP): Performed by: ORTHOPAEDIC SURGERY

## 2021-10-02 PROCEDURE — 94640 AIRWAY INHALATION TREATMENT: CPT

## 2021-10-02 PROCEDURE — 6370000000 HC RX 637 (ALT 250 FOR IP): Performed by: PHYSICAL MEDICINE & REHABILITATION

## 2021-10-02 RX ORDER — DOCUSATE SODIUM 100 MG/1
100 CAPSULE, LIQUID FILLED ORAL 2 TIMES DAILY
Status: DISCONTINUED | OUTPATIENT
Start: 2021-10-02 | End: 2021-10-05 | Stop reason: HOSPADM

## 2021-10-02 RX ORDER — SENNA PLUS 8.6 MG/1
2 TABLET ORAL NIGHTLY
Status: DISCONTINUED | OUTPATIENT
Start: 2021-10-02 | End: 2021-10-05 | Stop reason: HOSPADM

## 2021-10-02 RX ADMIN — ASPIRIN 81 MG: 81 TABLET, COATED ORAL at 20:31

## 2021-10-02 RX ADMIN — ASPIRIN 81 MG: 81 TABLET, COATED ORAL at 08:46

## 2021-10-02 RX ADMIN — POLYETHYLENE GLYCOL 3350 17 G: 17 POWDER, FOR SOLUTION ORAL at 11:22

## 2021-10-02 RX ADMIN — DOCUSATE SODIUM 100 MG: 100 CAPSULE, LIQUID FILLED ORAL at 11:22

## 2021-10-02 RX ADMIN — IPRATROPIUM BROMIDE AND ALBUTEROL SULFATE 1 AMPULE: .5; 3 SOLUTION RESPIRATORY (INHALATION) at 06:53

## 2021-10-02 RX ADMIN — AMLODIPINE BESYLATE 10 MG: 10 TABLET ORAL at 08:46

## 2021-10-02 RX ADMIN — OXYCODONE HYDROCHLORIDE 5 MG: 5 TABLET ORAL at 08:46

## 2021-10-02 RX ADMIN — OXYCODONE HYDROCHLORIDE 5 MG: 5 TABLET ORAL at 20:31

## 2021-10-02 RX ADMIN — LISINOPRIL 20 MG: 20 TABLET ORAL at 08:46

## 2021-10-02 ASSESSMENT — PAIN DESCRIPTION - PAIN TYPE
TYPE: ACUTE PAIN;SURGICAL PAIN
TYPE: ACUTE PAIN;SURGICAL PAIN

## 2021-10-02 ASSESSMENT — PAIN DESCRIPTION - LOCATION
LOCATION: KNEE
LOCATION: KNEE

## 2021-10-02 ASSESSMENT — PAIN SCALES - GENERAL
PAINLEVEL_OUTOF10: 4
PAINLEVEL_OUTOF10: 6
PAINLEVEL_OUTOF10: 7
PAINLEVEL_OUTOF10: 7
PAINLEVEL_OUTOF10: 5

## 2021-10-02 ASSESSMENT — PAIN DESCRIPTION - ORIENTATION
ORIENTATION: RIGHT;LEFT
ORIENTATION: RIGHT;LEFT

## 2021-10-02 NOTE — DISCHARGE SUMMARY
Physical Medicine & Rehabilitation  Discharge Summary     Patient Identification:  John Parks  : 1960  Admit date: 2021  Discharge date:  10/5/2021  Primary care provider: Vega Rose   Admitting Provider: Anup Durham MD  Discharging Provider: Areli Bradley MD    Problem List:    Bilateral total knee arthroplasty  Hypertension  COPD  Obstructive sleep apnea  Depression  Obesity      Patient Active Problem List   Diagnosis    Primary osteoarthritis of knees, bilateral    Acute respiratory failure with hypoxia and hypercapnia (HCC)    KEVIN (obstructive sleep apnea)    Morbid obesity ([facility]    History of tobacco use    COPD (chronic obstructive pulmonary disease) ([facility]    Aftercare following bilateral knee joint replacement surgery    S/P total knee arthroplasty, bilateral       Admission History:  John Parks is a 64 y.o. female with history of bilateral knee osteoarthritis which failed conservative management, HTN, KEVIN, depression, and anxiety admitted to the [address] [place] [address] on 2021. She was originally admitted to [address] [place] [address] on 21.     She initially presented for elective bilateral total knee arthroplasty, which took place on 21 (Dr. Ophelia Whittington). Genoveva Qualia was WBAT to the bilateral lower limbs. Hospital course was complicated by acute hypercapnic and hypoxic respiratory failure after surgery, which was treated with bipap and nasal cannula oxygen. Inpatient Rehabilitation Course:   John Parks was admitted to inpatient rehabilitation on 2021. Rehab course:  Progressed well and benefit for inpatient rehabilitation. Pain medications adjusted and tapered. Monitored blood pressure. Seen by pulmonary due to COPD/obstructive sleep apnea-noted declined BiPAP, would like to use oxygen at night,-was recommended to have oxygen 2 to 3 L at night to keep O2 sat greater than 88%. She will need sleep study as outpatient follow-up with pulmonology in [place]. She is requesting that any oxygen needs be addressed by her PCP. Discharge status:  good    Discharge Dispostiion:   Home with [facility]      The patient participated in an aggressive multidisciplinary inpatient rehabilitation program involving 3 hours per day, 5 days per week of rehabilitation. Patient benefited from inpatient rehab and was discharged in good and stable condition. Consults:   Internal Medicine, Pulmonology, Orthopedics    Significant Diagnostics:   CBC:   Lab Results   Component Value Date    WBC 9.8 09/30/2021    RBC 4.34 09/30/2021    HGB 13.0 09/30/2021    HCT 38.9 09/30/2021    MCV 89.7 09/30/2021    MCH 29.9 09/30/2021    MCHC 33.3 09/30/2021    RDW 13.6 09/30/2021     09/30/2021    MPV 6.9 09/30/2021     BMP:    Lab Results   Component Value Date     09/30/2021    K 4.4 09/30/2021     09/30/2021    CO2 30 09/30/2021    BUN 13 09/30/2021    CREATININE 0.61 09/30/2021    CALCIUM 8.7 09/30/2021    GFRAA >60 09/30/2021    LABGLOM >60 09/30/2021    GLUCOSE 106 09/30/2021       Discharge Functional Status:    Physical therapy:  Bed Mobility: Rolling: Modified independent  Supine to Sit: Modified independent  Sit to Supine: Modified independent  Scooting: Modified independent  Transfers: Sit to Stand: Stand by assistance  Stand to sit: Modified independent  Bed to Chair: Modified independent  Comment: Seated measurement Right knee flex 72* and Left knee flex 75*. Patient instructed to ask nursing staff assist with cryotherapy PRN., WB Status: FWBAT BLEs  Ambulation 1  Surface: level tile  Device: Rollator  Other Apparatus: O2 (2)  Assistance: Modified Independent  Quality of Gait: steady gait today with increased eleonora.   Gait Deviations: Slow Eleonora, Increased LUX, Decreased step length, Decreased step height  Distance: 2MWT 166 feet; 191 feet overall tolerated  Comments: patient tolerance to fatigue reported, Stairs  # Steps : 2 (also preformed 3- 4\" rise steps)  Stairs Height: 6\"  Rails: Bilateral  Curbs: 6\"  Device: 4 wheeled walker (SBA)  Assistance: Modified independent   Comment: patient completed step to pattern with no LOB noted  Mobility:  , PT Equipment Recommendations  Equipment Needed: Yes  Mobility Devices: Andrew Rear: Rolling  Other: Need for RW TBD, pt has a 4WW but may require a more stable AD upon d/c, Assessment: pt performs bed mobility with MaxAx1, transfers with Mod-MaxAx1 with RW, Amb x45' with CGA, VCs for technique, and RW; Requires MinAx1 for negotiating 1 step and overall demonstrates BLE weakness, decreased ROM, low endurance, and decreased balance. PT services are warranted to address the following impairments and progress patient towards prior level of indepedence to allow for a safe return home. Occupational therapy:  , Equipment Recommendations  Equipment Needed: Yes  Transfer Tub Bench: x  Reacher: x  Sock Aid: x  Other: long handled sponge,      Speech therapy:       Patient Instructions:    follow-up with 1. PCP-Kiana Murray 2. Orthopedics-Dr. Venita Sutherland 3.   Pulmonary follow-up in [place] due to sleep apnea,PFT, complete aspirin 81 mg twice daily course for DVT prophylaxis as per orthopedics    Medications, precautions and follow up reviewed with patient and family    Follow-up visits: See after visit summary from hospitalization    Discharge Medications:   Brad Oseguera, [address] Medication Instructions ENL:[id number]    Printed on:10/05/21 0813   Medication Information                      aluminum & magnesium hydroxide-simethicone (MAALOX) 200-200-20 MG/5ML SUSP suspension  Take 30 mLs by mouth every 6 hours as needed for Indigestion             amLODIPine (NORVASC) 10 MG tablet  Take 10 mg by mouth daily             aspirin 81 MG EC tablet  Take 1 tablet by mouth 2 times daily             calcium carbonate (TUMS) 500 MG chewable tablet  Take 1 tablet by mouth 3 times daily as needed for Heartburn             lisinopril (PRINIVIL;ZESTRIL) 20 MG tablet  Take 20 mg by mouth daily              oxyCODONE (ROXICODONE) 5 MG immediate release tablet  Take 1 tablet by mouth every 8 hours as needed for Pain for up to 7 days.              polyethylene glycol (GLYCOLAX) 17 g packet  Take 17 g by mouth daily                 Anand Vogel MD

## 2021-10-02 NOTE — PROGRESS NOTES
[address]   ACUTE REHABILITATION OCCUPATIONAL THERAPY  DAILY NOTE    Date: 10/2/21  Patient Name: Mike Diaz      Room: 9548/7950-49    MRN: [id number]   : 1960  (64 y.o.)  Gender: female   Referring Practitioner: Bing Quiroz MD/Dr. Dayana Christy  Diagnosis: B TKA 21       Restrictions  Restrictions/Precautions: Weight Bearing, Surgical Protocols, Fall Risk  Implants present? : Metal implants  Other position/activity restrictions: FWBAT RLE/LLE  Right Lower Extremity Weight Bearing: Weight Bearing As Tolerated  Left Lower Extremity Weight Bearing: Weight Bearing As Tolerated  Required Braces or Orthoses?: No  Equipment Used: Bed, Wheelchair    Subjective  Subjective: Pt reports up with pain since about 4am. RN providing pain meds upon writer entry. Comments: Pt pleasant and cooperative for session. Patient Currently in Pain: Yes  Pain Level: 7  Pain Location: Knee  Pain Orientation: Right;Left  Restrictions/Precautions: Weight Bearing;Surgical Protocols; Fall Risk  Overall Orientation Status: Within Functional Limits     Pain Assessment  Pain Assessment: 0-10  Pain Level: 7  Pain Type: Acute pain, Surgical pain  Pain Location: Knee  Pain Orientation: Right, Left    Objective  Cognition  Overall Cognitive Status: WFL  Perception  Overall Perceptual Status: WFL  Balance  Sitting Balance: Independent  Standing Balance: Supervision  Bed mobility  Supine to Sit: Stand by assistance  Comment: HOB elevated  Transfers  Sit to stand: Contact guard assistance  Stand to sit: Stand by assistance  Transfer Comments: pt demonstrates good hand placement. Standing Balance  Time: AM: ~6 min, ~3 min PM: 1-2 min, 6-7 min   Activity: functional mobilty, standing self care tasks at sinkside, IADL tasks, facilitated transfer training. Comment: no LOB, 0-2 UE support.    Functional Mobility  Functional - Mobility Device: Rolling Walker  Activity: To/from bathroom  Assist Level: Stand by assistance  Functional Mobility Comments: Good safety, no LOB, slow pace  Toilet Transfers  Toilet - Technique: Ambulating  Equipment Used: Raised toilet seat with rails  Toilet Transfer: Stand by assistance  Toilet Transfers Comments: w/RW (per report this date. )  Tub Transfers  Tub - Transfer From: Walker  Tub - Transfer Type: To and From  Tub - Transfer To: Transfer tub bench  Tub - Technique: Ambulating  Tub Transfers: Contact guard;Stand by assistance  Tub Transfers Comments: Faciliated dry transfer completed in PM to ensurse good safety and technique for home transfer; Increased time, slightly anxious with trunk pivot due to decreased positioning control on edge of bench. Pt able to lift BLEs over edge of tub with minimal difficulty and increased time for rest break. Pt agreeable to further practice prior to DC to increase comfort in technique. ADL  Equipment Provided: Sock aid  Feeding: Modified independent   Grooming: Setup;Supervision (standing sinkside. )  UE Bathing: Setup (seated sinkside. )  LE Bathing: None  UE Dressing: Setup  LE Dressing: Minimal assistance (Assist with TEDs only; sock aide for socks, no pants addressed)  Toileting: Stand by assistance (Pt reports hygiene assist still required if have BM)     Instrumental ADL's  Instrumental ADLs: Yes  Light Housekeeping  Light Housekeeping Level: Walker  Light Housekeeping Level of Assistance: Stand by assistance  Light Housekeeping: loading clothing and liquid detergent/pod into top load washer with set up assist. Later in treatment session, pt removed clothing from dryer; bending and reaching at lower level in good safety, folded in standing; single UE support when bending/reaching, no UE support when folding. Assessment  Performance deficits / Impairments: Decreased functional mobility ; Decreased ADL status; Decreased ROM; Decreased strength;Decreased safe awareness;Decreased endurance;Decreased balance;Decreased high-level IADLs; Decreased coordination  Prognosis: Good  Discharge Recommendations: Home with assist PRN  Activity Tolerance: Patient limited by pain; Patient Tolerated treatment well  Safety Devices in place: Yes  Type of devices: Patient at risk for falls;Call light within reach; Left in chair; All fall risk precautions in place  Equipment Recommendations  Equipment Needed: Yes  Transfer Tub Bench: x (DME ordered 10/1/21. )  Reacher: x  Sock Aid: x  Other: long handled sponge        Plan  Plan  Times per week: 900/7  Times per day: Twice a day  Current Treatment Recommendations: Strengthening, ROM, Balance Training, Functional Mobility Training, Endurance Training, Pain Management, Safety Education & Training, Patient/Caregiver Education & Training, Equipment Evaluation, Education, & procurement, Self-Care / ADL, Home Management Training  Patient Goals   Patient goals : To return to independence  Short term goals  Time Frame for Short term goals: One Week  Short term goal 1: Pt will complete UB bathing/dressing at Mod I level. Short term goal 2: Pt will complete LB bathing/dressing with Mod A and Good safety using AE as needed. Short term goal 3: Pt will complete toilet transfer and toileting tasks with Min A and Good safety using least restrictive device. Short term goal 4: Pt will tolerate standing for 5+ minutes with 1-2 UE support, SBA, and no LOB while completing a functional task. Short term goal 5: Pt will actively participate in 30 minutes of therapeutic exercise/activity to promote increased independence and safety with self-care and mobility. Long term goals  Time Frame for Long term goals : By Discharge  Long term goal 1: Pt will complete BADL's with Mod I and Good safety using AE as needed. Long term goal 2: Pt will complete functional transfers with Mod I and Good safety using least restrictive device.   Long term goal 3: Pt will tolerate standing for 10 minutes with 1-2 UE support and no LOB while completing a functional task. Long term goal 4: Pt will complete light housekeeping/simple meal prep with SBA and Good safety using least restrictive device. Long term goal 5: Pt will V/D at least 3 non-pharmacological pain mgt techniques and 3 EC/WS strategies that she can utilize during home routines.         10/02/21 0928 10/02/21 1455   OT Individual Minutes   Time In [id number]   Time Out 0926 1339   Minutes 47 34     Electronically signed by NICOLÁS Rondon on 10/2/21 at 3:20 PM EDT

## 2021-10-02 NOTE — PROGRESS NOTES
Physical Therapy  Facility/Department: [facility] – [place], [place] ACUTE REHAB  Daily Treatment Note  NAME: Krissy Garcia  : 1960  MRN: [id number]    Date of Service: 10/2/2021    Discharge Recommendations:  Patient would benefit from continued therapy after discharge   PT Equipment Recommendations  Equipment Needed: Yes  Mobility Devices: May Riedel: Rolling  Other: Need for RW TBD, pt has a 4WW but may require a more stable AD upon d/c    Assessment   Body structures, Functions, Activity limitations: Decreased functional mobility ; Decreased ROM; Decreased strength;Decreased endurance;Decreased balance; Increased pain  Treatment Diagnosis: impaired functional mobility secondary to Bilateral TKA procedure  Specific instructions for Next Treatment: transfer pt to sitting up in comfortable chair, seated ROM exercises for Bilateral knees. History: H/O HTN, Sleep apnea, current smoker, depression/anxiety. REQUIRES PT FOLLOW UP: Yes  Activity Tolerance  Activity Tolerance: Patient limited by fatigue;Patient limited by pain     Patient Diagnosis(es): There were no encounter diagnoses. has a past medical history of Anxiety, Arthritis, Depression, Hypertension, Knee pain, Murmur, PONV (postoperative nausea and vomiting), and Sleep apnea. has a past surgical history that includes Rotator cuff repair (Left); tumor removal; Ulnar tunnel release (Right); skin biopsy; eye surgery (Left, 2018); and knee surgery (Bilateral, 2021). Restrictions  Restrictions/Precautions  Restrictions/Precautions: Weight Bearing, Surgical Protocols, Fall Risk  Required Braces or Orthoses?: No  Implants present? : Metal implants  Lower Extremity Weight Bearing Restrictions  Right Lower Extremity Weight Bearing: Weight Bearing As Tolerated  Left Lower Extremity Weight Bearing: Weight Bearing As Tolerated  Position Activity Restriction  Other position/activity restrictions: FWBAT RLE/LLE  Subjective   General  Chart Reviewed:  Yes  Additional Pertinent Hx: pt is a 64 y.o. woman recently admitted to hospital for Bilateral TKA procedure on 9/21/21 by Dr. Italo Rodgers. Pt with initial diagnosis of Bilateral knee Degenerative Joint Disease and H/O osteoarthritis, sleep apnea (no machine), and HTN. Admitted to [facility] on 9/24/21  Response To Previous Treatment: Patient with no complaints from previous session. Family / Caregiver Present: No  Referring Practitioner: Dr. Howie Chavez  Subjective  Subjective: patient reporting no nausea at this time. stated did not sleep well at all. agreeable to therapy as tolerated. Pain Screening  Patient Currently in Pain: Yes  Pain Assessment  Pain Assessment: 0-10  Pain Level: 4  Pain Type: Acute pain;Surgical pain  Pain Location: Knee  Pain Orientation: Right;Left  Vital Signs  Patient Currently in Pain: Yes       Orientation  Orientation  Overall Orientation Status: Within Functional Limits  Objective   Bed mobility  Supine to Sit: Stand by assistance  Sit to Supine: Moderate assistance (assisted with B LEs)  Scooting: Contact guard assistance  Transfers  Sit to Stand: Minimal Assistance  Stand to sit: Stand by assistance  Bed to Chair: Stand by assistance  Stand Pivot Transfers: Stand by assistance  Comment: slow guarded movements,  Ambulation  Ambulation?: Yes  WB Status: FWBAT BLEs  Ambulation 1  Surface: level tile  Device: Rolling Walker  Assistance: Stand by assistance  Quality of Gait: Very slow & antalgic gait, Wide LUX, pt moves slowly/cautiously with dec bilateral step height and length. Heavy BUEs WB on RW  Gait Deviations: Slow Janet; Increased LUX; Decreased step length;Decreased step height  Distance: 110ft  Comments: patient tolerance to fatigue reported  Ambulation 2  Surface - 2: outdoors  Device 2: Rolling Walker  Assistance 2: Contact guard assistance  Quality of Gait 2: slow antalgic gait, continues to rely heavily on UE's  Gait Deviations: Slow Janet; Increased LUX; Decreased step length;Decreased step height  Distance: 100ft  Stairs/Curb  Stairs?: Yes  Stairs  # Steps : 5  Stairs Height: 6\"  Rails: None  Device: Rolling walker  Assistance: Contact guard assistance  Comment: box step utilized with RW over top for stability for patient. No LOB noted.  Increased nausae         Other exercises  Other exercises?: Yes  Other exercises 1: seated B LE exercises x20 reps with orange tband   Other exercises 2: NuStep L1 x12 minutes      Goals  Short term goals  Time Frame for Short term goals: 1 week  Short term goal 1: pt to demonstrate Bilateral Knee AROM of 90 degrees knee flexion to demo improved joint mechanics for safe functional mobility   Short term goal 2: pt to improve overall BLE strength by 1/2 manual muscle grade to improve strength and safety during functional transfers and mobility  Short term goal 3: pt to improve dynamic standing balance with RW from fair to fair+ to improve safety with functional mobility   Short term goal 4: pt to ambulate 50' & 2 turns with RW and CGA to improve mobility for household distances  Short term goal 5: pt to improve Bilat knee extension AROM to 0 degrees to improve joint mechanics for safe amb and transfers  Short term goal 6: pt to perform all bed mobility on flat bed with or w/o rails and MinAx1 to decrease burden of care  Long term goals  Time Frame for Long term goals : Until d/c  Long term goal 1: pt to demonstrate Bilateral Knee AROM of 105 degrees knee flexion to demo improved joint mechanics for safe functional mobility   Long term goal 2: pt to improve overall BLE strength to 4/5 to improve strength and safety during functional transfers and mobility  Long term goal 3: pt to ambulate 100' with RW Mod I to improve safety and tolerance to amb household distances  Long term goal 4: pt to demo negotiation of 10' ramp with RW and 1 step with SBA to allow for safe home access  Long term goal 5: pt to perform all bed mobility on flat bed with or w/o rails Mod I to decrease burden of care  Long term goal 6: pt to independently perform HEP strengthening and ROM with good technique to demo indepence with self-care routine  Long term goal 7: pt to amb 76' with RW in 2 minutes to demo improved gait speed to improve safety with household ambulation  Patient Goals   Patient goals : to return home safely    Plan    Plan  Times per week: 900 minutes per week of combined PT and OT  Times per day: Twice a day  Plan weeks: 1-2 weeks  Specific instructions for Next Treatment: transfer pt to sitting up in comfortable chair, seated ROM exercises for Bilateral knees. Current Treatment Recommendations: Strengthening, ROM, Balance Training, Functional Mobility Training, Transfer Training, Endurance Training, Gait Training, Stair training, Pain Management, Home Exercise Program, Safety Education & Training, Patient/Caregiver Education & Training, Equipment Evaluation, Education, & procurement  Safety Devices  Type of devices:  All fall risk precautions in place, Call light within reach, Left in bed  Restraints  Initially in place: No        10/02/21 1049 10/02/21 1254   PT Individual Minutes   Time In 1008 1224   Time Out 7800 5617   Minutes 63 28   PT Concurrent Minutes   Time In 5370  --    Time Out 1008  --    Minutes 14  --      Electronically signed by Darinel Andrews PTA on 10/2/21 at 1:21 PM EDT

## 2021-10-02 NOTE — CONSULTS
[facility] Internal Medicine    CONSULTATION    / FOLLOW UP VISIT       Date:   10/2/2021  Patient name:  Aimee Corado  Date of admission:  9/24/2021  3:48 PM  MRN:   [id number]  Account:  [de-identified]  YOB: 1960  PCP:    Ada Michele  Room:   [facility] [place][note 2]260-01  Code Status:    Full Code    Physician Requesting Consult: Lisa Pena MD    History of Present Illness:      C/C ;  Medical comorbidity management     REASON FOR CONSULT;  Medical comorbidity and medication management ;                                                 *Principal Problem:    S/P total knee arthroplasty, bilateral  Active Problems: Morbid obesity ([facility] [address]    History of tobacco use    COPD (chronic obstructive pulmonary disease) ([facility] [address]    Aftercare following bilateral knee joint replacement surgery  Resolved Problems:    * No resolved hospital problems. *           HPI;    S/p bilateral knee arthroplasty   Known to have obstructive sleep apnea   Did have acute exacerbation of COPD  Was seen by Dr. Deepti Lobo hypertensive  Patient is complaining of nausea and also have constipation. Denies vomiting, abdominal pain , urinary symptoms  Vitals:    10/01/21 0703 10/01/21 1828 10/02/21 0653 10/02/21 0738   BP:  114/66  (!) 119/54   Pulse:  69  71   Resp: 18 18 18 18   Temp:  98.1 °F (36.7 °C)  97.9 °F (36.6 °C)   TempSrc:    Oral   SpO2: 90% 94% 94% 92%   Weight:       Height:          The patient is a 64y.o.-year-old with ADL and mobility deficits secondary to knee osteoarthritis s/p bilateral total knee arthroplasty. She will require close medical monitoring for the comorbidities listed below. She will benefit from intensive interdisciplinary therapies and rehab nursing care and is appropriate for inpatient rehabilitation. The post admission physician evaluation (STEPHANIE) is consistent with the pre-admission assessment.   See above findings to reflect the elements required in the STEPHANIE.  Patient's admitting condition is consistent with the findings of the preadmission assessment by the rehabilitation admissions coordinator. Diagnoses/plan:     1. Bilateral knee osteoarthritis s/p bilateral total knee arthroplasty:  Procedure done 9/21/21. WBAT to the bilateral lower limbs. PT/OT for gait, mobility, strengthening, endurance, ADLs, and self care. Pain control with tylenol and oxycodone as needed. Follow up with Dr. Mary Kay Pires as outpatient. 2. Bilateral hand cramps:  Patient reports that she has had these intermittently in the past.  Electrolytes WNL. Magnesium is normal.  Heat/ice as needed. Added lidocaine cream as needed for pain. 3. HTN:  On amlodipine, lisinopril  4. COPD:  Per pulmonology. On duo-neb 4 times daily. Will need PFTs as outpatient. 5. KEVIN:  Did not tolerate nocturnal bipap. Continue nasal cannula oxygen at night (at least 2-3 L to keep O2 sat > 88%). Will need sleep study as outpatient. 6. Depression, anxiety:  Not currently on medication  7. Morbid obesity:  BMI 46.09  8. Bowel Management: Miralax daily, senokot prn, dulcolax prn, milk of magnesia prn.  9. DVT Prophylaxis:  aspirin BID  10. Internal Medicine for medical management          Past and Surgical hx as in H and P  Social History:     Tobacco:    reports that she has been smoking. She has been smoking about 1.00 pack per day. She has never used smokeless tobacco.  Alcohol:      reports previous alcohol use. Drug Use:  reports previous drug use. Review of Systems:     POSITIVE AND NEGATIVES AS DESCRIBED IN HISTORY OF PRESENT ILLNESS ;  IN ADDITION ;  Review of Systems   Constitutional: Positive for activity change and appetite change. Negative for chills, diaphoresis, fatigue, fever and unexpected weight change. HENT: Negative for congestion, sore throat, trouble swallowing and voice change. Eyes: Negative for photophobia, discharge, itching and visual disturbance.    Respiratory: Positive for cough and shortness of breath. Negative for apnea, choking, chest tightness, wheezing and stridor. Cardiovascular: Positive for leg swelling. Negative for chest pain and palpitations. Gastrointestinal: Positive for constipation and nausea. Negative for abdominal distention and vomiting. Endocrine: Negative for cold intolerance and heat intolerance. Genitourinary: Negative for difficulty urinating and dysuria. Musculoskeletal: Positive for gait problem. Negative for arthralgias and back pain. Neurological: Negative for dizziness, tremors, seizures, syncope, facial asymmetry, speech difficulty, light-headedness, numbness and headaches. Hematological: Negative for adenopathy. Psychiatric/Behavioral: Negative for agitation, behavioral problems, confusion, decreased concentration, dysphoric mood, hallucinations, self-injury, sleep disturbance and suicidal ideas. The patient is not nervous/anxious. All other systems negative                Physical Exam:     Physical Exam   Vitals:    10/01/21 0703 10/01/21 1828 10/02/21 0653 10/02/21 0738   BP:  114/66  (!) 119/54   Pulse:  69  71   Resp: 18 18 18 18   Temp:  98.1 °F (36.7 °C)  97.9 °F (36.6 °C)   TempSrc:    Oral   SpO2: 90% 94% 94% 92%   Weight:       Height:                       Body mass index is 46.09 kg/m². General Appearance:   -, CO-OPERATIVE ,                                                        Pulmonary/Chest:        Clear to auscultation bilaterally . No wheezes, rales or rhonchi . Cardiovascular:            Normal rate, regular rhythm,                                          No murmur or  Gallop . Abdomen:                       Soft, non-tender                                                                                    Extremities:                    No Edema . Neuromuskuloskeletal    ...      Data:     URINE ANALYSIS: No results found for: LABURIN     CBC:  Lab Results   Component Value Date    WBC 9.8 09/30/2021    HGB 13.0 09/30/2021     09/30/2021        BMP:    Lab Results   Component Value Date     09/30/2021    K 4.4 09/30/2021     09/30/2021    CO2 30 09/30/2021    BUN 13 09/30/2021    CREATININE 0.61 09/30/2021    GLUCOSE 106 09/30/2021      LIVER PROFILE:No results found for: ALT, AST, PROT, BILITOT, BILIDIR, LABALBU          Radiology:         Medications: Allergies:  No Known Allergies    Current Meds:   Scheduled Meds:    docusate sodium  100 mg Oral BID    senna  2 tablet Oral Nightly    aspirin  81 mg Oral BID    amLODIPine  10 mg Oral Daily    ipratropium-albuterol  1 ampule Inhalation 4x daily    lisinopril  20 mg Oral Daily    polyethylene glycol  17 g Oral Daily     Continuous Infusions:   PRN Meds: aluminum & magnesium hydroxide-simethicone, ondansetron, oxyCODONE **OR** [DISCONTINUED] oxyCODONE, calcium carbonate, magnesium hydroxide, acetaminophen, bisacodyl        Assessment :       Assessment Dx  Principal Problem:    S/P total knee arthroplasty, bilateral  Active Problems: Morbid obesity ([facility] [address]    History of tobacco use    COPD (chronic obstructive pulmonary disease) ([facility] [address]    Aftercare following bilateral knee joint replacement surgery  Resolved Problems:    * No resolved hospital problems.  *              Plan:     Patient s/p bilateral knee arthroplasty   Has advanced COPD obstructive sleep apnea morbid obesity         10/2/2021    Bilateral knee osteoarthritis s/p arthroplasty:  -Continue physical therapy  -Analgesics for pain control    Hypertension:  -Continue amlodipine 10 mg daily and lisinopril 20 mg daily  -Blood pressure is optimally controlled    COPD, not currently in exacerbation:  -Continue DuoNeb nebulizations 4 times daily  -BiPAP    Constipation:  -Continue Senokot 1 tablet 2 times daily, GlycoLax 17 g orally daily  -If no bowel movement by tomorrow, will consider enema  -Antiemetics as needed             Thanks for consulting us . Will monitor vitals and clinical course , and  Optimize therapy  as needed . Ben Lu MD       Copy sent to Dr. Marni Eaton that this chart was generated using voice recognition Dragon dictation software. Although every effort was made to ensure the accuracy of this automated transcription, some errors in transcription may have occurred.

## 2021-10-02 NOTE — PROGRESS NOTES
Physical Medicine & Rehabilitation  Progress Note    10/2/2021 10:52 AM     CC: Ambulatory and ADL dysfunction due to bilateral total knee arthroplasty    Subjective:   Last BM 2 days ago with suppository. Feeling better today. Less nausea. Just took pain medication. ROS:  Denies fevers, chills, sweats. No chest pain, palpitations, lightheadedness. Denies coughing, wheezing or shortness of breath. Denies abdominal pain, nausea, diarrhea or constipation. No new areas of joint pain. Denies new areas of numbness or weakness. Denies new anxiety or depression issues. No new skin problems. Rehabilitation:   PT:  Restrictions/Precautions: Weight Bearing, Surgical Protocols, Fall Risk  Implants present? : Metal implants  Other position/activity restrictions: FWBAT RLE/LLE  Right Lower Extremity Weight Bearing: Weight Bearing As Tolerated  Left Lower Extremity Weight Bearing: Weight Bearing As Tolerated   Transfers  Sit to Stand: Stand by assistance  Stand to sit: Stand by assistance  Bed to Chair: Stand by assistance  Stand Pivot Transfers: Stand by assistance  Comment: slow guarded movements,  WB Status: FWBAT BLEs  Ambulation 1  Surface: outdoors  Device: Rolling Walker  Other Apparatus: O2 (2)  Assistance: Stand by assistance  Quality of Gait: Very slow & antalgic gait, Wide LUX, pt moves slowly/cautiously with dec bilateral step height and length. Heavy BUEs WB on RW  Gait Deviations: Slow Janet, Increased LUX, Decreased step length, Decreased step height  Distance: 75ft x2  Comments: patient tolerance to fatigue reported          OT:  ADL  Equipment Provided: Sock aid  Feeding: Modified independent   Grooming: Setup, Supervision (standing sinkside. )  UE Bathing: Setup (seated sinkside. )  LE Bathing: None  UE Dressing: Setup  LE Dressing: Minimal assistance (Assist with TEDs only; sock aide for socks, no pants address)  Toileting: Stand by assistance (per pt report; no BM.  Pt reports still requiring A c hygiene)  Additional Comments: BATEMAN facilitated pt ADLS this AM. Pt polietly declines shower. Completes UB dressing sitting EOB. Req assist for TEDs and socks. Instrumental ADL's  Instrumental ADLs: Yes     Balance  Sitting Balance: Independent  Standing Balance: Supervision   Standing Balance  Time: ~6 min, ~3 min   Activity: functional mobilty, standing self care tasks at sinkside, IADL task  Comment: no LOB, 0-2 UE support. Functional Mobility  Functional - Mobility Device: Rolling Walker  Activity: To/from bathroom  Assist Level: Stand by assistance  Functional Mobility Comments: Good safety, no LOB, slow pace     Bed mobility  Bridging: Minimal assistance  Rolling to Left: Unable to assess  Rolling to Right: Unable to assess  Supine to Sit: Stand by assistance  Sit to Supine: Minimal assistance  Scooting: Minimal assistance  Comment: HOB elevated  Transfers  Stand Step Transfers: Stand by assistance  Stand Pivot Transfers: Stand by assistance  Sit to stand: Contact guard assistance  Stand to sit: Stand by assistance  Transfer Comments: pt demonstrates good hand placement. Toilet Transfers  Toilet - Technique: Ambulating  Equipment Used: Raised toilet seat with rails  Toilet Transfer: Stand by assistance  Toilet Transfers Comments: w/RW (per report this date. )  Tub Transfers  Tub Transfers Comments: further discussed TTB, notified OTR for input of order to prepare for discharge 10/5. Shower Transfers  Shower - Transfer From: Mikhail Villa (RW)  Shower - Transfer Type: To and From  Shower - Transfer To:  Transfer tub bench  Shower - Technique: Ambulating  Shower Transfers: Contact Guard, Stand by assistance  Shower Transfers Comments: Pt ambulated into shower with CGA and RW; Stand pivot to w/c after shower 2* fatigue, completed transfer with GB and SBA  Wheelchair Bed Transfers  Wheelchair/Bed - Technique: Stand pivot  Equipment Used: Bed, Wheelchair  Level of Asssistance: Minimal assistance  Wheelchair Transfers Comments: Min A to stand from EOB, CGA for pivot with RW      ST:            Objective:  BP (!) 119/54   Pulse 71   Temp 97.9 °F (36.6 °C) (Oral)   Resp 18   Ht 5' 2\" (1.575 m)   Wt 252 lb (114.3 kg)   SpO2 92%   BMI 46.09 kg/m²  I Body mass index is 46.09 kg/m². I   Wt Readings from Last 1 Encounters:   21 252 lb (114.3 kg)      Temp (24hrs), Av °F (36.7 °C), Min:97.9 °F (36.6 °C), Max:98.1 °F (36.7 °C)         GEN: well developed, well nourished, no acute distress  HEENT: Normocephalic atraumatic, EOMI, mucous membranes pink and moist  CV: RRR, no murmurs, rubs or gallops  PULM: CTAB, no rales or rhonchi. Respirations WNL and unlabored  ABD: soft, NT, ND, +BS and equal, no guarding or rebound  NEURO: A&O x3. Sensation intact to light touch. MSK: 4+/5 upper extremity distal lower extremities, knee incisions with Aquacel , no erythema  around dressing, no significant drainage no change  EXTREMITIES: No calf tenderness to palpation bilaterally. 1+ edema lower extremities  SKIN: warm dry and intact with good turgor  PSYCH: appropriately interactive. Affect WNL. Good spirits        Medications   Scheduled Meds:   docusate sodium  100 mg Oral BID    senna  2 tablet Oral Nightly    aspirin  81 mg Oral BID    amLODIPine  10 mg Oral Daily    ipratropium-albuterol  1 ampule Inhalation 4x daily    lisinopril  20 mg Oral Daily    polyethylene glycol  17 g Oral Daily     Continuous Infusions:  PRN Meds:.aluminum & magnesium hydroxide-simethicone, ondansetron, oxyCODONE **OR** [DISCONTINUED] oxyCODONE, calcium carbonate, magnesium hydroxide, acetaminophen, bisacodyl     Diagnostics:     CBC:   Recent Labs     21  0602   WBC 9.8   RBC 4.34   HGB 13.0   HCT 38.9   MCV 89.7   RDW 13.6        BMP:   Recent Labs     21  0602      K 4.4      CO2 30   BUN 13   CREATININE 0.61     BNP: No results for input(s): BNP in the last 72 hours.   PT/INR: No results for daily-questionable change to aerosol. Gemma Ward need PFTs as outpatient.,  No longer O2 however may need home O2 evaluation on discharge  -per pulmonary-declined BiPAP, reluctant to use oxygen at [facility],t to follow with pulmonologist in [place] area had recommended nasal cannula at least 2 to 3 L at night keep O2 sat greater than 88%, will need sleep study as outpatient  8. Depression, anxiety:  Not currently on medication  9. Morbid obesity:  BMI 46.09  10. Bowel Management: Miralax daily, senokot prn, dulcolax prn, milk of magnesia prn. Last BM 9/30-  11. DVT Prophylaxis:  aspirin BID-length as per Ortho  12. Internal Medicine for medical management  13. Discharge plan 10/5, follow-up with 1. PCP-Kiana Murray 2. Orthopedics-Dr. Jeni Trejo, patient would like to be seen prior to discharge 3. Pulmonary follow-up in [place] due to sleep apnea,PFT, to consider home O2 evaluation particularly nocturnal if patient agreeable to oxygen, clarify home versus outpatient therapies, complete aspirin 81 mg twice daily course as per orthopedics, no driving    Juan Francisco Carr MD       This note is created with the assistance of a speech recognition program.  While intending to generate a document that actually reflects the content of the visit, the document can still have some errors including those of syntax and sound a like substitutions which may escape proof reading.   In such instances, actual meaning can be extrapolated by contextual diversion

## 2021-10-03 PROCEDURE — 6370000000 HC RX 637 (ALT 250 FOR IP): Performed by: ORTHOPAEDIC SURGERY

## 2021-10-03 PROCEDURE — 6370000000 HC RX 637 (ALT 250 FOR IP): Performed by: PHYSICAL MEDICINE & REHABILITATION

## 2021-10-03 PROCEDURE — 97535 SELF CARE MNGMENT TRAINING: CPT

## 2021-10-03 PROCEDURE — 97110 THERAPEUTIC EXERCISES: CPT

## 2021-10-03 PROCEDURE — 99232 SBSQ HOSP IP/OBS MODERATE 35: CPT | Performed by: INTERNAL MEDICINE

## 2021-10-03 PROCEDURE — 97530 THERAPEUTIC ACTIVITIES: CPT

## 2021-10-03 PROCEDURE — 99232 SBSQ HOSP IP/OBS MODERATE 35: CPT | Performed by: PHYSICAL MEDICINE & REHABILITATION

## 2021-10-03 PROCEDURE — 51798 US URINE CAPACITY MEASURE: CPT

## 2021-10-03 PROCEDURE — 1180000000 HC REHAB R&B

## 2021-10-03 PROCEDURE — 97116 GAIT TRAINING THERAPY: CPT

## 2021-10-03 RX ADMIN — LISINOPRIL 20 MG: 20 TABLET ORAL at 08:06

## 2021-10-03 RX ADMIN — OXYCODONE HYDROCHLORIDE 5 MG: 5 TABLET ORAL at 15:29

## 2021-10-03 RX ADMIN — ASPIRIN 81 MG: 81 TABLET, COATED ORAL at 07:53

## 2021-10-03 RX ADMIN — OXYCODONE HYDROCHLORIDE 5 MG: 5 TABLET ORAL at 21:28

## 2021-10-03 RX ADMIN — DOCUSATE SODIUM 100 MG: 100 CAPSULE, LIQUID FILLED ORAL at 21:29

## 2021-10-03 RX ADMIN — OXYCODONE HYDROCHLORIDE 5 MG: 5 TABLET ORAL at 07:51

## 2021-10-03 RX ADMIN — DOCUSATE SODIUM 100 MG: 100 CAPSULE, LIQUID FILLED ORAL at 07:51

## 2021-10-03 RX ADMIN — AMLODIPINE BESYLATE 10 MG: 10 TABLET ORAL at 08:06

## 2021-10-03 RX ADMIN — ASPIRIN 81 MG: 81 TABLET, COATED ORAL at 21:28

## 2021-10-03 RX ADMIN — STANDARDIZED SENNA CONCENTRATE 17.2 MG: 8.6 TABLET ORAL at 21:28

## 2021-10-03 ASSESSMENT — PAIN SCALES - GENERAL
PAINLEVEL_OUTOF10: 5
PAINLEVEL_OUTOF10: 5
PAINLEVEL_OUTOF10: 6
PAINLEVEL_OUTOF10: 4
PAINLEVEL_OUTOF10: 5
PAINLEVEL_OUTOF10: 4

## 2021-10-03 ASSESSMENT — PAIN DESCRIPTION - LOCATION
LOCATION: KNEE
LOCATION: KNEE

## 2021-10-03 ASSESSMENT — PAIN DESCRIPTION - ORIENTATION
ORIENTATION: RIGHT;LEFT
ORIENTATION: RIGHT;LEFT

## 2021-10-03 ASSESSMENT — PAIN DESCRIPTION - PAIN TYPE
TYPE: SURGICAL PAIN;ACUTE PAIN
TYPE: SURGICAL PAIN;ACUTE PAIN

## 2021-10-03 NOTE — CONSULTS
[facility]    CONSULTATION    / FOLLOW UP VISIT       Date:   10/3/2021  Patient name:  Zhao Valle  Date of admission:  9/24/2021  3:48 PM  MRN:   [id number]  Account:  [de-identified]  YOB: 1960  PCP:    Jimena Stephens  Room:   [facility]2608-  Code Status:    Full Code    Physician Requesting Consult: Tawanda Churchill MD    History of Present Illness:      C/C ;  Medical comorbidity management     REASON FOR CONSULT;  Medical comorbidity and medication management ;                                                 *Principal Problem:    S/P total knee arthroplasty, bilateral  Active Problems: Morbid obesity ([facility] [address]    History of tobacco use    COPD (chronic obstructive pulmonary disease) ([facility] [address]    Aftercare following bilateral knee joint replacement surgery  Resolved Problems:    * No resolved hospital problems. *           HPI;    S/p bilateral knee arthroplasty   Known to have obstructive sleep apnea   Did have acute exacerbation of COPD  Was seen by Dr. Inge Hammans hypertensive  Patient is complaining of nausea and also have constipation. Denies vomiting, abdominal pain , urinary symptoms  Vitals:    10/02/21 0738 10/02/21 1840 10/03/21 0800 10/03/21 1003   BP: (!) 119/54 (!) 122/57 136/66 119/63   Pulse: 71 66 66 66   Resp: 18 18 16    Temp: 97.9 °F (36.6 °C) 98.1 °F (36.7 °C) 98.2 °F (36.8 °C)    TempSrc: Oral Oral Oral    SpO2: 92% 94% 94% 97%   Weight:       Height:          The patient is a 64y.o.-year-old with ADL and mobility deficits secondary to knee osteoarthritis s/p bilateral total knee arthroplasty. She will require close medical monitoring for the comorbidities listed below. She will benefit from intensive interdisciplinary therapies and rehab nursing care and is appropriate for inpatient rehabilitation. The post admission physician evaluation (STEPHANIE) is consistent with the pre-admission assessment.   See above findings to reflect the elements required in the STEPHANIE. Patient's admitting condition is consistent with the findings of the preadmission assessment by the rehabilitation admissions coordinator. Diagnoses/plan:     1. Bilateral knee osteoarthritis s/p bilateral total knee arthroplasty:  Procedure done 9/21/21. WBAT to the bilateral lower limbs. PT/OT for gait, mobility, strengthening, endurance, ADLs, and self care. Pain control with tylenol and oxycodone as needed. Follow up with Dr. Mikala Jiménez as outpatient. 2. Bilateral hand cramps:  Patient reports that she has had these intermittently in the past.  Electrolytes WNL. Magnesium is normal.  Heat/ice as needed. Added lidocaine cream as needed for pain. 3. HTN:  On amlodipine, lisinopril  4. COPD:  Per pulmonology. On duo-neb 4 times daily. Will need PFTs as outpatient. 5. KEVIN:  Did not tolerate nocturnal bipap. Continue nasal cannula oxygen at night (at least 2-3 L to keep O2 sat > 88%). Will need sleep study as outpatient. 6. Depression, anxiety:  Not currently on medication  7. Morbid obesity:  BMI 46.09  8. Bowel Management: Miralax daily, senokot prn, dulcolax prn, milk of magnesia prn.  9. DVT Prophylaxis:  aspirin BID  10. Internal Medicine for medical management          Past and Surgical hx as in H and P  Social History:     Tobacco:    reports that she has been smoking. She has been smoking about 1.00 pack per day. She has never used smokeless tobacco.  Alcohol:      reports previous alcohol use. Drug Use:  reports previous drug use. Review of Systems:     POSITIVE AND NEGATIVES AS DESCRIBED IN HISTORY OF PRESENT ILLNESS ;  IN ADDITION ;  Review of Systems   Constitutional: Positive for activity change and appetite change. Negative for chills, diaphoresis, fatigue, fever and unexpected weight change. HENT: Negative for congestion, sore throat, trouble swallowing and voice change.     Eyes: Negative for photophobia, discharge, itching and visual disturbance. Respiratory: Positive for cough and shortness of breath. Negative for apnea, choking, chest tightness, wheezing and stridor. Cardiovascular: Positive for leg swelling. Negative for chest pain and palpitations. Gastrointestinal: Positive for constipation and nausea. Negative for abdominal distention and vomiting. Endocrine: Negative for cold intolerance and heat intolerance. Genitourinary: Negative for difficulty urinating and dysuria. Musculoskeletal: Positive for gait problem. Negative for arthralgias and back pain. Neurological: Negative for dizziness, tremors, seizures, syncope, facial asymmetry, speech difficulty, light-headedness, numbness and headaches. Hematological: Negative for adenopathy. Psychiatric/Behavioral: Negative for agitation, behavioral problems, confusion, decreased concentration, dysphoric mood, hallucinations, self-injury, sleep disturbance and suicidal ideas. The patient is not nervous/anxious. All other systems negative                Physical Exam:     Physical Exam   Vitals:    10/02/21 0738 10/02/21 1840 10/03/21 0800 10/03/21 1003   BP: (!) 119/54 (!) 122/57 136/66 119/63   Pulse: 71 66 66 66   Resp: 18 18 16    Temp: 97.9 °F (36.6 °C) 98.1 °F (36.7 °C) 98.2 °F (36.8 °C)    TempSrc: Oral Oral Oral    SpO2: 92% 94% 94% 97%   Weight:       Height:                       Body mass index is 46.09 kg/m². General Appearance:   -, CO-OPERATIVE ,                                                        Pulmonary/Chest:        Clear to auscultation bilaterally . No wheezes, rales or rhonchi . Cardiovascular:            Normal rate, regular rhythm,                                          No murmur or  Gallop . Abdomen:                       Soft, non-tender                                                                                    Extremities:                    No Edema . Neuromuskuloskeletal    ... Data:     URINE ANALYSIS: No results found for: LABURIN     CBC:  Lab Results   Component Value Date    WBC 9.8 09/30/2021    HGB 13.0 09/30/2021     09/30/2021        BMP:    Lab Results   Component Value Date     09/30/2021    K 4.4 09/30/2021     09/30/2021    CO2 30 09/30/2021    BUN 13 09/30/2021    CREATININE 0.61 09/30/2021    GLUCOSE 106 09/30/2021      LIVER PROFILE:No results found for: ALT, AST, PROT, BILITOT, BILIDIR, LABALBU          Radiology:         Medications: Allergies:  No Known Allergies    Current Meds:   Scheduled Meds:    docusate sodium  100 mg Oral BID    senna  2 tablet Oral Nightly    aspirin  81 mg Oral BID    amLODIPine  10 mg Oral Daily    ipratropium-albuterol  1 ampule Inhalation 4x daily    lisinopril  20 mg Oral Daily    polyethylene glycol  17 g Oral Daily     Continuous Infusions:   PRN Meds: aluminum & magnesium hydroxide-simethicone, ondansetron, oxyCODONE **OR** [DISCONTINUED] oxyCODONE, calcium carbonate, magnesium hydroxide, acetaminophen, bisacodyl        Assessment :       Assessment Dx  Principal Problem:    S/P total knee arthroplasty, bilateral  Active Problems: Morbid obesity ([facility] [address]    History of tobacco use    COPD (chronic obstructive pulmonary disease) ([facility] [address]    Aftercare following bilateral knee joint replacement surgery  Resolved Problems:    * No resolved hospital problems.  *              Plan:     Patient s/p bilateral knee arthroplasty   Has advanced COPD obstructive sleep apnea morbid obesity         10/3/2021    Bilateral knee osteoarthritis s/p arthroplasty:  -Continue physical therapy  -Analgesics for pain control    Hypertension:  -Continue amlodipine 10 mg daily and lisinopril 20 mg daily  -Blood pressure is optimally controlled    COPD, not currently in exacerbation:  -Continue DuoNeb nebulizations 4 times daily  -BiPAP    Constipation:  -Continue Senokot 1 tablet 2 times daily, GlycoLax 17 g orally daily  -If no bowel movement by tomorrow, will consider enema  -Antiemetics as needed             Thanks for consulting us . Will monitor vitals and clinical course , and  Optimize therapy  as needed . Caryn Zaldivar MD       Copy sent to Dr. Naga Santiago that this chart was generated using voice recognition Dragon dictation software. Although every effort was made to ensure the accuracy of this automated transcription, some errors in transcription may have occurred.

## 2021-10-03 NOTE — PROGRESS NOTES
Physical Medicine & Rehabilitation  Progress Note    10/3/2021 12:33 PM     CC: Ambulatory and ADL dysfunction due to bilateral total knee arthroplasty    Subjective:   BM 10/2 feeling better today. Less nausea. ROS:  Denies fevers, chills, sweats. No chest pain, palpitations, lightheadedness. Denies coughing, wheezing or shortness of breath. Denies abdominal pain, nausea, diarrhea or constipation. No new areas of joint pain. Denies new areas of numbness or weakness. Denies new anxiety or depression issues. No new skin problems. Rehabilitation:   PT:  Restrictions/Precautions: Weight Bearing, Surgical Protocols, Fall Risk  Implants present? : Metal implants  Other position/activity restrictions: FWBAT RLE/LLE  Right Lower Extremity Weight Bearing: Weight Bearing As Tolerated  Left Lower Extremity Weight Bearing: Weight Bearing As Tolerated   Transfers  Sit to Stand: Stand by assistance  Stand to sit: Stand by assistance  Bed to Chair: Stand by assistance  Stand Pivot Transfers: Stand by assistance  Comment: slow guarded movements,  WB Status: FWBAT BLEs  Ambulation 1  Surface: level tile  Device: Rolling Walker  Other Apparatus: O2 (2)  Assistance: Stand by assistance  Quality of Gait: Very slow & antalgic gait, Wide LUX, pt moves slowly/cautiously with dec bilateral step height and length.  Heavy BUEs WB on RW  Gait Deviations: Slow Janet, Increased LUX, Decreased step length, Decreased step height  Distance: 152ft  Comments: patient tolerance to fatigue reported          OT:  ADL  Equipment Provided: Sock aid, Long-handled sponge, Reacher  Feeding: Modified independent   Grooming: Setup  UE Bathing: Setup  LE Bathing: Setup, Supervision  UE Dressing: Minimal assistance (Assist to pull down sports bar )  LE Dressing: Minimal assistance (Assist with TEDs, supervision otherwise)  Toileting: None  Additional Comments: Pt completes clothing set up at wheelchair level from closet, uses RW to transport into bathroom. Instrumental ADL's  Instrumental ADLs: Yes     Balance  Sitting Balance: Independent  Standing Balance: Supervision   Standing Balance  Time: AM: 1-2 min trials, 4-5 min   Activity: AM: functional mobility, self care tasks  Comment: standing tolerance well tolerated for functional needs, no LOB, 0-2 UE support. Functional Mobility  Functional - Mobility Device: Rolling Walker  Activity: To/from bathroom, Transport items  Assist Level: Stand by assistance  Functional Mobility Comments: educ technique to transport clothing safely with RW; pt reports she normally uses 4WW with seat/basket, pt is hopeful to use upon discharge. Bed mobility  Bridging: Minimal assistance  Rolling to Left: Unable to assess  Rolling to Right: Unable to assess  Supine to Sit: Stand by assistance  Sit to Supine: Moderate assistance (assisted with B LEs)  Scooting: Contact guard assistance  Comment: HOB elevated  Transfers  Stand Step Transfers: Stand by assistance  Stand Pivot Transfers: Stand by assistance  Sit to stand: Supervision, Stand by assistance  Stand to sit: Supervision, Stand by assistance  Transfer Comments: pt demonstrates good hand placement. Toilet Transfers  Toilet - Technique: Ambulating  Equipment Used: Raised toilet seat with rails  Toilet Transfer: Stand by assistance  Toilet Transfers Comments: w/RW (per report this date. )  Tub Transfers  Tub - Transfer From: Walker  Tub - Transfer Type: To and From  Tub - Transfer To: Transfer tub bench  Tub - Technique: Ambulating  Tub Transfers: Contact guard, Stand by assistance  Tub Transfers Comments: Faciliated dry transfer completed in PM to ensurse good safety and technique for home transfer; Increased time, slightly anxious with trunk pivot due to decreased positioning control on edge of bench. Pt able to lift BLEs over edge of tub with minimal difficulty and increased time for rest break.  Pt agreeable to further practice prior to DC to increase comfort in technique. Shower Transfers  Shower - Transfer From: [facility] ()  Shower - Transfer Type: To and From  Shower - Transfer To: Transfer tub bench  Shower - Technique: Ambulating  Shower Transfers: Stand by assistance  Shower Transfers Comments: Pt ambulated into shower with CGA and RW; Stand pivot to w/c after shower 2* fatigue, completed transfer with GB and SBA  Wheelchair Bed Transfers  Wheelchair/Bed - Technique: Stand pivot  Equipment Used: Bed, Wheelchair  Level of Asssistance: Minimal assistance  Wheelchair Transfers Comments: Min A to stand from EOB, CGA for pivot with RW      ST:            Objective:  /63   Pulse 66   Temp 98.2 °F (36.8 °C) (Oral)   Resp 16   Ht 5' 2\" (1.575 m)   Wt 252 lb (114.3 kg)   SpO2 97%   BMI 46.09 kg/m²  I Body mass index is 46.09 kg/m². I   Wt Readings from Last 1 Encounters:   21 252 lb (114.3 kg)      Temp (24hrs), Av.2 °F (36.8 °C), Min:98.1 °F (36.7 °C), Max:98.2 °F (36.8 °C)         GEN: well developed, well nourished, no acute distress  HEENT: Normocephalic atraumatic, EOMI, mucous membranes pink and moist  CV: RRR, no murmurs, rubs or gallops  PULM: CTAB, no rales or rhonchi. Respirations WNL and unlabored  ABD: soft, NT, ND, +BS and equal, no guarding or rebound  NEURO: A&O x3. Sensation intact to light touch. MSK: 4+/5 upper extremity distal lower extremities, knee incisions with Aquacel , no erythema  around dressing, no significant drainage no change  EXTREMITIES: No calf tenderness to palpation bilaterally. 1+ edema lower extremities  SKIN: warm dry and intact with good turgor  PSYCH: appropriately interactive. Affect WNL.   Good spirits        Medications   Scheduled Meds:   docusate sodium  100 mg Oral BID    senna  2 tablet Oral Nightly    aspirin  81 mg Oral BID    amLODIPine  10 mg Oral Daily    ipratropium-albuterol  1 ampule Inhalation 4x daily    lisinopril  20 mg Oral Daily    polyethylene glycol  17 g Oral Daily Continuous Infusions:  PRN Meds:.aluminum & magnesium hydroxide-simethicone, ondansetron, oxyCODONE **OR** [DISCONTINUED] oxyCODONE, calcium carbonate, magnesium hydroxide, acetaminophen, bisacodyl     Diagnostics:     CBC:   No results for input(s): WBC, RBC, HGB, HCT, MCV, RDW, PLT in the last 72 hours. BMP:   No results for input(s): NA, K, CL, CO2, PHOS, BUN, CREATININE in the last 72 hours. Invalid input(s): CA  BNP: No results for input(s): BNP in the last 72 hours. PT/INR: No results for input(s): PROTIME, INR in the last 72 hours. APTT: No results for input(s): APTT in the last 72 hours. CARDIAC ENZYMES: No results for input(s): CKMB, CKMBINDEX, TROPONINT in the last 72 hours. Invalid input(s): CKTOTAL;3  FASTING LIPID PANEL:No results found for: CHOL, HDL, TRIG  LIVER PROFILE: No results for input(s): AST, ALT, ALB, BILIDIR, BILITOT, ALKPHOS in the last 72 hours. I/O (24Hr): No intake or output data in the 24 hours ending 10/03/21 1233    Glu last 24 hour  No results for input(s): POCGLU in the last 72 hours. No results for input(s): CLARITYU, COLORU, PHUR, SPECGRAV, PROTEINU, RBCUA, BLOODU, BACTERIA, NITRU, WBCUA, LEUKOCYTESUR, YEAST, Richerd Moan in the last 72 hours.         Impression/Plan:   Impaired ADLs, gait, and mobility due to:     1. Bilateral knee osteoarthritis s/p bilateral total knee arthroplasty:  Procedure done 9/21/21.  WBAT to the bilateral lower limbs.  Continue rehab efforts, Follow up with Dr. Lazaro Goode as outpatient, , discharge plan 10/5, patient would prefer to see Dr. Lazaro Goode prior to discharge-nursing to notify tomorrow a.m.  2. Pain-tylenol and oxycodone as needed. Tolerating decrease to 5 mg every 6 hours as needed  3. Chronic incontinence of urine-check PVR, nursing notes will check today-PVR 0 on 10/2  4. Nausea- better after bowel movement and questionable decrease in pain meds. -  reviewed consideration of NicoDerm patch -patient does not want, labs okay  5. Bilateral hand cramps:  Patient reports that she has had these intermittently in the past.  Electrolytes WNL, including magnesium. Heat/ice as needed. Will DC lidocaine cream to hands, not using, monitor  6. HTN:  On amlodipine, lisinopril  7. Clinical COPD/obstructive sleep apnea:  Per pulmonology.  On duo-neb 4 times daily-questionable change to aerosol-defer to IM.  Will need PFTs as outpatient.,  No longer using O2 however may need home O2 evaluation on discharge  -per pulmonary-declined BiPAP, reluctant to use oxygen at night, to follow with pulmonologist in [place] area -had recommended nasal cannula at least 2 to 3 L at night to keep O2 sat greater than 88%, will need sleep study as outpatient-discussed at length today with patient-does not want BiPAP, will consider if willing to use home O2 at nighttime, if agreeable will need home O2 evaluation tomorrow night prior to discharge  8. Depression, anxiety:  Not currently on medication  9. Morbid obesity:  BMI 46.09  10. Bowel Management: Miralax daily, senokot prn, dulcolax prn, milk of magnesia prn. Last BM 9/30-  11. DVT Prophylaxis:  aspirin BID-length as per Ortho  12. Internal Medicine for medical management  13. Discharge plan 10/5, follow-up with 1. PCP-Kiana Murray 2. Orthopedics-Dr. Ophelia Castaneda, patient would like to be seen prior to discharge 3. Pulmonary follow-up in [place] due to sleep apnea,PFT, to consider home O2 evaluation particularly nocturnal if patient agreeable to oxygen, clarify home versus outpatient therapies, complete aspirin 81 mg twice daily course as per orthopedics, no driving    Juan Francisco Joseph MD       This note is created with the assistance of a speech recognition program.  While intending to generate a document that actually reflects the content of the visit, the document can still have some errors including those of syntax and sound a like substitutions which may escape proof reading.   In such instances, actual meaning can be extrapolated by contextual diversion

## 2021-10-03 NOTE — PROGRESS NOTES
Physical Therapy  Facility/Department: [facility] ACUTE REHAB  Daily Treatment Note  NAME: Mike Diaz  : 1960  MRN: [id number]    Date of Service: 10/3/2021    Discharge Recommendations:  Patient would benefit from continued therapy after discharge   PT Equipment Recommendations  Equipment Needed: Yes  Mobility Devices: [place]: Rolling  Other: Need for RW TBD, pt has a 4WW but may require a more stable AD upon d/c    Assessment   Body structures, Functions, Activity limitations: Decreased functional mobility ; Decreased ROM; Decreased strength;Decreased endurance;Decreased balance; Increased pain  Treatment Diagnosis: impaired functional mobility secondary to Bilateral TKA procedure  Specific instructions for Next Treatment: transfer pt to sitting up in comfortable chair, seated ROM exercises for Bilateral knees. History: H/O HTN, Sleep apnea, current smoker, depression/anxiety. REQUIRES PT FOLLOW UP: Yes  Activity Tolerance  Activity Tolerance: Patient limited by fatigue;Patient limited by pain     Patient Diagnosis(es): There were no encounter diagnoses. has a past medical history of Anxiety, Arthritis, Depression, Hypertension, Knee pain, Murmur, PONV (postoperative nausea and vomiting), and Sleep apnea. has a past surgical history that includes Rotator cuff repair (Left); tumor removal; Ulnar tunnel release (Right); skin biopsy; eye surgery (Left, 2018); and knee surgery (Bilateral, 2021). Restrictions  Restrictions/Precautions  Restrictions/Precautions: Weight Bearing, Surgical Protocols, Fall Risk  Required Braces or Orthoses?: No  Implants present? : Metal implants  Lower Extremity Weight Bearing Restrictions  Right Lower Extremity Weight Bearing: Weight Bearing As Tolerated  Left Lower Extremity Weight Bearing: Weight Bearing As Tolerated  Position Activity Restriction  Other position/activity restrictions: FWBAT RLE/LLE  Subjective   General  Chart Reviewed:  Yes  Additional Pertinent Hx: pt is a 64 y.o. woman recently admitted to hospital for Bilateral TKA procedure on 9/21/21 by Dr. Abel Weston. Pt with initial diagnosis of Bilateral knee Degenerative Joint Disease and H/O osteoarthritis, sleep apnea (no machine), and HTN. Ad[facility] to [facility] 9[facility]21  Response To Previous Treatment: Patient with no complaints from previous session. Family / Caregiver Present: No  Referring Practitioner: Dr. Williams Durham: Patient reporting lightheadedness during standing exercises in AM   General Comment  Comments: RN made aware of lightheadedness reported by patient in AM   Pain Screening  Patient Currently in Pain: Yes  Pain Assessment  Pain Assessment: 0-10  Pain Level: 4  Pain Type: Surgical pain;Acute pain  Pain Location: Knee  Pain Orientation: Right;Left  Vital Signs  Pulse: 66  Heart Rate Source: Monitor  BP: 119/63  BP Location: Right upper arm  Patient Currently in Pain: Yes  Oxygen Therapy  SpO2: 97 %  Pulse Oximeter Device Mode: Intermittent  Pulse Oximeter Device Location: Finger  O2 Device: None (Room air)       Orientation  Orientation  Overall Orientation Status: Within Functional Limits  Objective      Transfers  Sit to Stand: Stand by assistance  Stand to sit: Stand by assistance  Bed to Chair: Stand by assistance  Comment: slow guarded movements,  Ambulation  Ambulation?: Yes  WB Status: FWBAT BLEs  Ambulation 1  Surface: level tile  Device: Rolling Walker  Assistance: Stand by assistance  Quality of Gait: Very slow & antalgic gait, Wide LUX, pt moves slowly/cautiously with dec bilateral step height and length. Heavy BUEs WB on RW  Gait Deviations: Slow Janet; Increased LUX; Decreased step length;Decreased step height  Distance: 152ft AM 121ft PM; short distances within gym   Comments: patient tolerance to fatigue reported  Stairs/Curb  Stairs?: Yes  Stairs  # Steps : 5  Stairs Height:  (4\"/6\")  Rails: Bilateral  Device: No Device  Assistance: Contact guard assistance  Comment: patient completed step to pattern with no LOB noted        Other exercises  Other exercises?: Yes  Other exercises 1: seated B LE exercises x20 reps with orange tband   Other exercises 2: standing B LE hip flexion x15 reps with seated rest breaks in between. Became lightheaded afetr performing, requested to end AM session.  Took /63  Other exercises 3: NuStep L1 x10 minuutes @ seat 9     Goals  Short term goals  Time Frame for Short term goals: 1 week  Short term goal 1: pt to demonstrate Bilateral Knee AROM of 90 degrees knee flexion to demo improved joint mechanics for safe functional mobility   Short term goal 2: pt to improve overall BLE strength by 1/2 manual muscle grade to improve strength and safety during functional transfers and mobility  Short term goal 3: pt to improve dynamic standing balance with RW from fair to fair+ to improve safety with functional mobility   Short term goal 4: pt to ambulate 48' & 2 turns with RW and CGA to improve mobility for household distances  Short term goal 5: pt to improve Bilat knee extension AROM to 0 degrees to improve joint mechanics for safe amb and transfers  Short term goal 6: pt to perform all bed mobility on flat bed with or w/o rails and MinAx1 to decrease burden of care  Long term goals  Time Frame for Long term goals : Until d/c  Long term goal 1: pt to demonstrate Bilateral Knee AROM of 105 degrees knee flexion to demo improved joint mechanics for safe functional mobility   Long term goal 2: pt to improve overall BLE strength to 4/5 to improve strength and safety during functional transfers and mobility  Long term goal 3: pt to ambulate 100' with RW Mod I to improve safety and tolerance to amb household distances  Long term goal 4: pt to demo negotiation of 10' ramp with RW and 1 step with SBA to allow for safe home access  Long term goal 5: pt to perform all bed mobility on flat bed with or w/o rails Mod I to decrease burden of care  Long term goal 6: pt to independently perform HEP strengthening and ROM with good technique to demo indepence with self-care routine  Long term goal 7: pt to amb 76' with RW in 2 minutes to demo improved gait speed to improve safety with household ambulation  Patient Goals   Patient goals : to return home safely    Plan    Plan  Times per week: 900 minutes per week of combined PT and OT  Times per day: Twice a day  Plan weeks: 1-2 weeks  Current Treatment Recommendations: Strengthening, ROM, Balance Training, Functional Mobility Training, Transfer Training, Endurance Training, Gait Training, Stair training, Pain Management, Home Exercise Program, Safety Education & Training, Patient/Caregiver Education & Training, Equipment Evaluation, Education, & procurement  Safety Devices  Type of devices:  All fall risk precautions in place, Call light within reach, Left in bed  Restraints  Initially in place: No        10/03/21 1003 10/03/21 1232   PT Individual Minutes   Time In 1003 1220   Time Out 2989 2244   Minutes 54 27   PT Concurrent Minutes   Time In  --  7713   Time Out  --  1308   Minutes  --  21     Electronically signed by Homa Peralta PTA on 10/3/21 at 1:12 PM EDT

## 2021-10-03 NOTE — PROGRESS NOTES
[address]   ACUTE REHABILITATION OCCUPATIONAL THERAPY  DAILY NOTE    Date: 10/3/21  Patient Name: Matt Arenas      Room: 6217/2202-50    MRN: [id number]   : 1960  (64 y.o.)  Gender: female   Referring Practitioner: Shaggy Michelle MD/Dr. Patel Ordaz  Diagnosis: B TKA 21       Restrictions  Restrictions/Precautions: Weight Bearing, Surgical Protocols, Fall Risk  Implants present? : Metal implants  Other position/activity restrictions: FWBAT RLE/LLE  Right Lower Extremity Weight Bearing: Weight Bearing As Tolerated  Left Lower Extremity Weight Bearing: Weight Bearing As Tolerated  Required Braces or Orthoses?: No  Equipment Used: Bed, Wheelchair    Subjective  Comments: Pt pleasant and cooperative for session. Patient Currently in Pain: Yes  Pain Level: 4 (at rest. )  Pain Location: Knee  Pain Orientation: Right;Left  Restrictions/Precautions: Weight Bearing;Surgical Protocols; Fall Risk  Overall Orientation Status: Within Functional Limits     Pain Assessment  Pain Assessment: 0-10  Pain Level: 4 (at rest. )  Pain Type: Surgical pain, Acute pain  Pain Location: Knee  Pain Orientation: Right, Left    Objective  Cognition  Overall Cognitive Status: WFL  Perception  Overall Perceptual Status: WFL  Balance  Sitting Balance: Independent  Standing Balance: Supervision  Transfers  Sit to stand: Supervision;Stand by assistance  Stand to sit: Supervision;Stand by assistance  Transfer Comments: pt demonstrates good hand placement. Standing Balance  Time: AM: 1-2 min trials, 4-5 min   Activity: AM: functional mobility, self care tasks  Comment: standing tolerance well tolerated for functional needs, no LOB, 0-2 UE support.    Functional Mobility  Functional - Mobility Device: Rolling Walker  Activity: To/from bathroom;Transport items  Assist Level: Stand by assistance  Functional Mobility Comments: educ technique to transport clothing safely with RW; pt reports she normally uses 4WW with seat/basket, pt is hopeful to use upon discharge. Shower Transfers  Shower - Transfer From: Kenny Miller (RW)  Shower - Transfer Type: To and From  Shower - Transfer To: Transfer tub bench  Shower - Technique: Ambulating  Shower Transfers: Stand by assistance     Type of ROM/Therapeutic Exercise  Type of ROM/Therapeutic Exercise: Free weights (3#, x20 reps. )  Comment: Pt engaged in UB ex's to promote overall strength and endurance for functional trasnfers/tasks. (Required educ/encouragement on benefit of UE ex's. )  Exercises  Scapular Protraction: x  Scapular Retraction: x  Shoulder Flexion: x  Shoulder Extension: x  Horizontal ABduction: x  Horizontal ADduction: x  Elbow Flexion: x  Elbow Extension: x                       ADL  Equipment Provided: Sock aid;Long-handled sponge;Reacher  Feeding: Modified independent   Grooming: Setup  UE Bathing: Setup  LE Bathing: Setup;Supervision  UE Dressing: Minimal assistance (Assist to pull down sports bar )  LE Dressing: Minimal assistance (Assist with TEDs, supervision otherwise)  Toileting: None  Additional Comments: Pt completes clothing set up at wheelchair level from [facility], uses RW to transport into bathroom. Assessment  Performance deficits / Impairments: Decreased functional mobility ; Decreased ADL status; Decreased ROM; Decreased strength;Decreased safe awareness;Decreased endurance;Decreased balance;Decreased high-level IADLs;Decreased coordination  Prognosis: Good  Discharge Recommendations: Home with assist PRN  Activity Tolerance: Patient Tolerated treatment well  Safety Devices in place: Yes  Type of devices: Patient at risk for falls;Call light within reach; Left in chair; All fall risk precautions in place  Equipment Recommendations  Equipment Needed: Yes  Transfer Tub Bench: x (order placed)  Reacher: x  Sock Aid: x  Other: long handled sponge          Plan  Plan  Times per week: 900/7  Times per day: Twice a day  Current Treatment Recommendations: Strengthening, ROM, Balance Training, Functional Mobility Training, Endurance Training, Pain Management, Safety Education & Training, Patient/Caregiver Education & Training, Equipment Evaluation, Education, & procurement, Self-Care / ADL, Home Management Training  Patient Goals   Patient goals : To return to independence  Short term goals  Time Frame for Short term goals: One Week  Short term goal 1: Pt will complete UB bathing/dressing at Mod I level. Short term goal 2: Pt will complete LB bathing/dressing with Mod A and Good safety using AE as needed. Short term goal 3: Pt will complete toilet transfer and toileting tasks with Min A and Good safety using least restrictive device. Short term goal 4: Pt will tolerate standing for 5+ minutes with 1-2 UE support, SBA, and no LOB while completing a functional task. Short term goal 5: Pt will actively participate in 30 minutes of therapeutic exercise/activity to promote increased independence and safety with self-care and mobility. Long term goals  Time Frame for Long term goals : By Discharge  Long term goal 1: Pt will complete BADL's with Mod I and Good safety using AE as needed. Long term goal 2: Pt will complete functional transfers with Mod I and Good safety using least restrictive device. Long term goal 3: Pt will tolerate standing for 10 minutes with 1-2 UE support and no LOB while completing a functional task. Long term goal 4: Pt will complete light housekeeping/simple meal prep with SBA and Good safety using least restrictive device. Long term goal 5: Pt will V/D at least 3 non-pharmacological pain mgt techniques and 3 EC/WS strategies that she can utilize during home routines.         10/03/21 0957 10/03/21 1456   OT Individual Minutes   Time In 0845 1305   Time Out 0940 1333   Minutes 55 28     Electronically signed by NICOLÁS Heredia on 10/3/21 at 3:03 PM EDT

## 2021-10-04 PROCEDURE — 97535 SELF CARE MNGMENT TRAINING: CPT

## 2021-10-04 PROCEDURE — 1180000000 HC REHAB R&B

## 2021-10-04 PROCEDURE — 97530 THERAPEUTIC ACTIVITIES: CPT

## 2021-10-04 PROCEDURE — 97110 THERAPEUTIC EXERCISES: CPT

## 2021-10-04 PROCEDURE — 6370000000 HC RX 637 (ALT 250 FOR IP): Performed by: ORTHOPAEDIC SURGERY

## 2021-10-04 PROCEDURE — 94640 AIRWAY INHALATION TREATMENT: CPT

## 2021-10-04 PROCEDURE — 6370000000 HC RX 637 (ALT 250 FOR IP): Performed by: PHYSICAL MEDICINE & REHABILITATION

## 2021-10-04 PROCEDURE — 97116 GAIT TRAINING THERAPY: CPT

## 2021-10-04 PROCEDURE — 99232 SBSQ HOSP IP/OBS MODERATE 35: CPT | Performed by: INTERNAL MEDICINE

## 2021-10-04 PROCEDURE — 6370000000 HC RX 637 (ALT 250 FOR IP): Performed by: STUDENT IN AN ORGANIZED HEALTH CARE EDUCATION/TRAINING PROGRAM

## 2021-10-04 PROCEDURE — 99232 SBSQ HOSP IP/OBS MODERATE 35: CPT | Performed by: PHYSICAL MEDICINE & REHABILITATION

## 2021-10-04 RX ORDER — ASPIRIN 81 MG/1
81 TABLET ORAL 2 TIMES DAILY
Qty: 30 TABLET | Refills: 3 | Status: SHIPPED | OUTPATIENT
Start: 2021-10-04

## 2021-10-04 RX ORDER — MAGNESIUM HYDROXIDE/ALUMINUM HYDROXICE/SIMETHICONE 120; 1200; 1200 MG/30ML; MG/30ML; MG/30ML
30 SUSPENSION ORAL EVERY 6 HOURS PRN
Refills: 0 | COMMUNITY
Start: 2021-10-04

## 2021-10-04 RX ORDER — POLYETHYLENE GLYCOL 3350 17 G/17G
17 POWDER, FOR SOLUTION ORAL DAILY
Qty: 527 G | Refills: 1 | COMMUNITY
Start: 2021-10-05 | End: 2021-11-04

## 2021-10-04 RX ORDER — CALCIUM CARBONATE 200(500)MG
500 TABLET,CHEWABLE ORAL 3 TIMES DAILY PRN
COMMUNITY
Start: 2021-10-04 | End: 2021-11-03

## 2021-10-04 RX ORDER — OXYCODONE HYDROCHLORIDE 5 MG/1
5 TABLET ORAL EVERY 8 HOURS PRN
Qty: 21 TABLET | Refills: 0 | Status: SHIPPED | OUTPATIENT
Start: 2021-10-04 | End: 2021-10-11

## 2021-10-04 RX ADMIN — OXYCODONE HYDROCHLORIDE 5 MG: 5 TABLET ORAL at 15:45

## 2021-10-04 RX ADMIN — ASPIRIN 81 MG: 81 TABLET, COATED ORAL at 08:35

## 2021-10-04 RX ADMIN — LISINOPRIL 20 MG: 20 TABLET ORAL at 08:36

## 2021-10-04 RX ADMIN — IPRATROPIUM BROMIDE AND ALBUTEROL SULFATE 1 AMPULE: .5; 3 SOLUTION RESPIRATORY (INHALATION) at 21:48

## 2021-10-04 RX ADMIN — DOCUSATE SODIUM 100 MG: 100 CAPSULE, LIQUID FILLED ORAL at 19:12

## 2021-10-04 RX ADMIN — OXYCODONE HYDROCHLORIDE 5 MG: 5 TABLET ORAL at 08:35

## 2021-10-04 RX ADMIN — AMLODIPINE BESYLATE 10 MG: 10 TABLET ORAL at 08:36

## 2021-10-04 RX ADMIN — ASPIRIN 81 MG: 81 TABLET, COATED ORAL at 19:12

## 2021-10-04 RX ADMIN — OXYCODONE HYDROCHLORIDE 5 MG: 5 TABLET ORAL at 21:39

## 2021-10-04 RX ADMIN — DOCUSATE SODIUM 100 MG: 100 CAPSULE, LIQUID FILLED ORAL at 08:36

## 2021-10-04 RX ADMIN — STANDARDIZED SENNA CONCENTRATE 17.2 MG: 8.6 TABLET ORAL at 19:12

## 2021-10-04 ASSESSMENT — PAIN SCALES - GENERAL
PAINLEVEL_OUTOF10: 3
PAINLEVEL_OUTOF10: 5
PAINLEVEL_OUTOF10: 5
PAINLEVEL_OUTOF10: 4
PAINLEVEL_OUTOF10: 4
PAINLEVEL_OUTOF10: 5
PAINLEVEL_OUTOF10: 4
PAINLEVEL_OUTOF10: 3

## 2021-10-04 ASSESSMENT — PAIN DESCRIPTION - PAIN TYPE
TYPE: ACUTE PAIN;SURGICAL PAIN
TYPE: ACUTE PAIN;SURGICAL PAIN

## 2021-10-04 ASSESSMENT — PAIN DESCRIPTION - LOCATION
LOCATION: LEG
LOCATION: KNEE

## 2021-10-04 ASSESSMENT — PAIN DESCRIPTION - ORIENTATION
ORIENTATION: RIGHT;LEFT
ORIENTATION: RIGHT

## 2021-10-04 NOTE — PATIENT CARE CONFERENCE
[facility]: [facility]   ACUTE REHABILITATION  TEAM CONFERENCE NOTE  Date: 10/5/21  Patient Name: Ambar Burgos       Room: 0164/2322-45  MRN: [id number]       : 1960  (64 y.o.)     Gender: female   Referring Practitioner: Dr. Perlita Lares   Aftercare following bilateral knee joint replacement surgery [Z47.1, E85.062]  Diagnosis: B TKA 21     NURSING  Bladder  Always Continent  Bowel   Always Continent  Date of Last BM: 10/4/21  Intervention    None     Wounds/Incisions/Ulcers: Incision healing well  Medication Education Program: Patient able to manage medications and being educated by nursing  Pain: Patient's pain is currently controlled with -  oxycodone PRN    Fall Risk:  Falling star program initiated    PHYSICAL THERAPY    Bed Mobility  Bridging: Modified independent   Rolling: Modified independent  Supine to Sit: Modified independent  Sit to Supine: Modified independent  Scooting: Modified independent    Transfers:  Sit to Stand: Stand by assistance  Stand to sit: Modified independent  Bed to Chair: Modified independent    WB Status: FWBAT BLEs  Ambulation 1  Surface: level tile  Device: Rollator  Assistance: Modified Independent  Quality of Gait: steady gait today with increased janet. Gait Deviations: Slow Janet; Increased LUX; Decreased step length;Decreased step height  Distance: 2MWT 166 feet; 191 feet overall tolerated  Comments: patient tolerance to fatigue reported    # Steps : 2 (also preformed 3- 4\" rise steps)  Stairs Height: 6\"  Rails: Bilateral  Curbs: 6\"  Device: 4 wheeled walker (SBA)  Assistance: Modified independent   Comment: patient completed step to pattern with no LOB noted            Equipment Needed: Yes  Mobility Devices: [address]  pt has a 4WW upon d/c      Goals  Time Frame for Short term goals: 1 week  Short term goal 1: pt to demonstrate Bilateral Knee AROM of 90 degrees knee flexion to demo improved joint mechanics for safe functional mobility   Short term goal 2: pt to improve overall BLE strength by 1/2 manual muscle grade to improve strength and safety during functional transfers and mobility  Short term goal 3: pt to improve dynamic standing balance with RW from fair to fair+ to improve safety with functional mobility   Short term goal 4: pt to ambulate 48' & 2 turns with RW and CGA to improve mobility for household distances  Short term goal 5: pt to improve Bilat knee extension AROM to 0 degrees to improve joint mechanics for safe amb and transfers  Short term goal 6: pt to perform all bed mobility on flat bed with or w/o rails and MinAx1 to decrease burden of care      OCCUPATIONAL THERAPY  SELF CARE   Equipment Provided: Sock aid, Long-handled sponge, Reacher  Eating   6  Independent     Modified independent    Oral Hygiene   6  Assistance Needed: Independent  dentures  Modified independent    Shower/Bathe Self   6  Assistance Needed: Independent      UE Bathing: None  LE Bathing: None   Upper Body Dressing   6  Assistance Needed: Independent     Modified independent    Lower Body Dressing   6  Assistance Needed: Independent     Putting On/Taking Off Footwear   3  Assistance Needed: Partial/moderate assistance  Dependent for TEDs, pt able to doff/osorio socks with sock aide. Minimal assistance (Assist with B TEDs)   Toilet Transfer   6  Assistance Needed: [address]   6 Assistance Needed: Independent      Modified independent     Bed mobility  Supine to Sit: Modified independent     Tub Transfers: Supervision  Shower Transfers: Stand by assistance  Balance  Sitting Balance: Independent  Standing Balance: Modified independent   Standing Balance  Time: AM: 1-2 min trials, 4-5 min   Activity: AM: functional mobility, self care tasks  Comment: standing tolerance well tolerated for functional needs, no LOB, 0-2 UE support.      Equipment Recommendations  Equipment Needed: Yes  Transfer Tub Bench: x  Reacher: x  Sock Aid: x  Other: long handled sponge       Short term goals  Time Frame for Short term goals: One Week  Short term goal 1: Pt will complete UB bathing/dressing at Mod I level. Short term goal 2: Pt will complete LB bathing/dressing with Mod A and Good safety using AE as needed. Short term goal 3: Pt will complete toilet transfer and toileting tasks with Min A and Good safety using least restrictive device. Short term goal 4: Pt will tolerate standing for 5+ minutes with 1-2 UE support, SBA, and no LOB while completing a functional task. Short term goal 5: Pt will actively participate in 30 minutes of therapeutic exercise/activity to promote increased independence and safety with self-care and mobility. SPEECH THERAPY      NUTRITION  Weight: 252 lb (114.3 kg) / Body mass index is 46.09 kg/m². Diet Rx: Regular. Previous issues with nausea appear to be resolving. PO intake improving with estimated intake of 50-75% of meals. Please see nutrition note for details. SOCIAL WORK ASSESSMENT  Assessment: Pt plans to return home upon discharge. Her son lives with her but will be of little support as he recently broke his leg. Pt to discharge with [facility]. Pt is unsure of transportation. [facility] to deliver transfer bench.    Pre-Admission Status:  Lives With: Son  Type of Home: Mobile home (Double wide)  Home Layout: One level  Home Access: Stairs to enter without rails, Ramped entrance (B hand rails on ramp, not by steps)  Entrance Stairs - Number of Steps: 1 step in addition to ramp  Bathroom Shower/Tub: Tub/Shower unit  [employer]: Standard (sink close by for support)  Bathroom Accessibility: Walker accessible  Home Equipment: 4 wheeled walker  Receives Help From: Family, Friend(s)  ADL Assistance: Independent  Homemaking Assistance: Independent  Homemaking Responsibilities: Yes  Ambulation Assistance: Independent  Transfer Assistance: Independent (used 4WW PRN)  Active : Yes  Mode of Transportation: SUV  IADL Comments: pt reports sleeping in flat bed and denies any recent falls  Additional Comments: Pt has 2 sons, 1 son recently broke his leg, but his GF is able to assist pt and provide 24/hr care (GF does not work). Pt's second son is due to come home Sunday or Monday but works full time. per pt has friends and family to provide transport and meals. Family Education: Family Education Completed    Percentage Risk for Readmission: Low 0 - 18%   Risk of Unplanned Readmission:  8 %     Critical Items: None        Problem / Barrier Intervention / Plan  Results   Impaired function related to decreased ROM/strength S/P B TKA ROM, strengthening, functional mobility with assistive device     Decreased tolerance to activity Rest PRN,  minutes/week for PT/OT.     Altered ADL status related to Bilateral Knee Replacements  Training in use of devices and modified Care strategies to support ADL independence                                         Total Self Care Score    Total Mobility Score  Admission Score:  17      Admission Score:  29  Goal:  42/42         Goal:  72/90   `  Discharge Plan   Estimated Discharge Date: 10/5/2021  Home evaluation needed?  Home Evaluation Indication (NO, Requires ReEval, YES/Date): No home evaluation need indicated for patient at this time  Overnight or Day Pass: No  Factors facilitating achievement of predicted outcomes: Family support, Motivated, Cooperative, Pleasant and Knowledge about rehab  Barriers to the achievement of predicted outcomes: Decreased endurance and Lower extremity weakness    Functional Goals at discharge:  Predicted Outcome: Home with familyPATIENT'S LEVEL OF ASSISTANCE: Modified independence  Discharge therapy goals:  PT: Long term goals  Time Frame for Long term goals : Until d/c  Long term goal 1: pt to demonstrate Bilateral Knee AROM of 105 degrees knee flexion to demo improved joint mechanics for safe functional mobility   Long term goal 2: pt to improve overall BLE strength to 4/5 to improve strength and safety during functional transfers and mobility  Long term goal 3: pt to ambulate 100' with RW Mod I to improve safety and tolerance to amb household distances  Long term goal 4: pt to demo negotiation of 10' ramp with RW and 1 step with SBA to allow for safe home access  Long term goal 5: pt to perform all bed mobility on flat bed with or w/o rails Mod I to decrease burden of care  Long term goal 6: pt to independently perform HEP strengthening and ROM with good technique to demo indepence with self-care routine  Long term goal 7: pt to amb 76' with RW in 2 minutes to demo improved gait speed to improve safety with household ambulation  OT:Long term goals  Time Frame for Long term goals : By Discharge  Long term goal 1: Pt will complete BADL's with Mod I and Good safety using AE as needed. Long term goal 2: Pt will complete functional transfers with Mod I and Good safety using least restrictive device. Long term goal 3: Pt will tolerate standing for 10 minutes with 1-2 UE support and no LOB while completing a functional task. Long term goal 4: Pt will complete light housekeeping/simple meal prep with SBA and Good safety using least restrictive device. Long term goal 5: Pt will V/D at least 3 non-pharmacological pain mgt techniques and 3 EC/WS strategies that she can utilize during home routines. ST:     Team Members Present at Conference:  :  Jossy Chaudhry [place]  Occupational Therapist: Jagdeep Haji OT   Physical Therapist: Alice Acosta PT  Speech Therapist:  N/A  Nurse: Yanna Lockhart RN   Dietary/Nutrition: Bhavna Busch RD, LD  Pastoral Care: Sacha An  CMG: Kenny Miller RN    I approve the established interdisciplinary plan of care as documented within the medical record of Mike Diaz.     Ezra Zee MD

## 2021-10-04 NOTE — DISCHARGE INSTR - COC
Continuity of Care Form    Patient Name: Delon Perrin   :  1960  MRN:  [id number]    Admit date:  2021  Discharge date:  10/5    Code Status Order: Full Code   Advance Directives:      Admitting Physician:  Aleksandar Rosales MD  PCP: Vanessa Herrera    Discharging Nurse: [facility] Unit/Room#: 9866/5462-32  Discharging Unit Phone Number: [phone]    Emergency Contact:   Extended Emergency Contact Information  Primary Emergency Contact: Shaneka Soto  Mobile Phone: [phone]  Relation: Brother/Sister  Preferred language: Natacha Saucedo  Secondary Emergency Contact: Jordan Spears  Mobile Phone: [phone]  Relation: Parent    Past Surgical History:  Past Surgical History:   Procedure Laterality Date    EYE SURGERY Left 2018    had a tear in left eye- had laser surgery to repair    KNEE SURGERY Bilateral 2021    KNEE TOTAL ARTHROPLASTY BILATERAL performed by Lazarus Lax, MD at [address] [place] [address] Left     SKIN BIOPSY      negative    TUMOR REMOVAL      left foot    ULNAR TUNNEL RELEASE Right     pinched nerve- has a plate and 7 screws        Immunization History: There is no immunization history on file for this patient.     Active Problems:  Patient Active Problem List   Diagnosis Code    Primary osteoarthritis of knees, bilateral M17.0    Acute respiratory failure with hypoxia and hypercapnia ([facility]) J96.01, J96.02    KEVIN (obstructive sleep apnea) G47.33    Morbid obesity ([facility]) E66.01    History of tobacco use Z87.891    COPD (chronic obstructive pulmonary disease) ([facility] [address] J44.9    Aftercare following bilateral knee joint replacement surgery Z47.1, Z96.653    S/P total knee arthroplasty, bilateral Z93.699       Isolation/Infection:   Isolation            No Isolation          Patient Infection Status       Infection Onset Added Last Indicated Last Indicated By Review Planned Expiration Resolved Resolved By    None active    Resolved    COVID-19 Rule Out 21 10/5/21 at 12:25 AM EDT    CASE MANAGEMENT/SOCIAL WORK SECTION    Inpatient Status Date: 9/24/2021    Readmission Risk Assessment Score:  Readmission Risk              Risk of Unplanned Readmission:  8           Discharging to Facility/ Agency   · Name: [facility]  · [address],   · [place], [address] [place] [address]  · Phone: [phone]  · Fax: [phone]      / signature: Electronically signed by VIOLET Sharma on 10/4/21 at 8:57 AM EDT    PHYSICIAN SECTION    Prognosis: Good    Condition at Discharge: Stable    Rehab Potential (if transferring to Rehab): Good    Recommended Labs or Other Treatments After Discharge: PT/OT to eval and treat. Physician Certification: I certify the above information and transfer of Manjinder Tena  is necessary for the continuing treatment of the diagnosis listed and that she requires [address] for less 30 days.      Update Admission H&P: No change in H&P    PHYSICIAN SIGNATURE:  Electronically signed by Tara Yarbrough MD on 10/4/21 at 10:36 PM EDT

## 2021-10-04 NOTE — PLAN OF CARE
Nutrition Problem #1: Inadequate oral intake  Intervention: Food and/or Nutrient Delivery: Continue Current Diet, Discontinue Oral Nutrition Supplement  Nutritional Goals: PO intake % of most meals.

## 2021-10-04 NOTE — PLAN OF CARE
Problem: Falls - Risk of:  Goal: Will remain free from falls  Description: Will remain free from falls  10/4/2021 1539 by Stephen Saunders RN  Outcome: Ongoing  10/4/2021 0405 by Padmini Donaldson RN  Outcome: Ongoing  Goal: Absence of physical injury  Description: Absence of physical injury  10/4/2021 1539 by Stephen Saunders RN  Outcome: Ongoing  10/4/2021 0405 by Padmini Donaldson RN  Outcome: Ongoing     Problem: Skin Integrity:  Goal: Will show no infection signs and symptoms  Description: Will show no infection signs and symptoms  10/4/2021 1539 by Stephen Saunders RN  Outcome: Ongoing  10/4/2021 0405 by Padmini Donaldson RN  Outcome: Ongoing  Goal: Absence of new skin breakdown  Description: Absence of new skin breakdown  10/4/2021 1539 by Stephen Saunders RN  Outcome: Ongoing  10/4/2021 0405 by Padmini Donaldson RN  Outcome: Ongoing     Problem: Pain:  Goal: Pain level will decrease  Description: Pain level will decrease  10/4/2021 1539 by Stephen Saunders RN  Outcome: Ongoing  10/4/2021 0405 by Padmini Donaldson RN  Outcome: Ongoing  Goal: Control of acute pain  Description: Control of acute pain  10/4/2021 1539 by Stephen Saunders RN  Outcome: Ongoing  10/4/2021 0405 by Padmini Donaldson RN  Outcome: Ongoing  Goal: Control of chronic pain  Description: Control of chronic pain  10/4/2021 1539 by Stephen Saunders RN  Outcome: Ongoing  10/4/2021 0405 by Padmini Donaldson RN  Outcome: Ongoing     Problem: Musculor/Skeletal Functional Status  Goal: Highest potential functional level  10/4/2021 1539 by Stephen Saunders RN  Outcome: Ongoing  10/4/2021 0405 by Padmini Donaldson RN  Outcome: Ongoing  Goal: Absence of falls  10/4/2021 1539 by Stephen Saunders RN  Outcome: Ongoing  10/4/2021 0405 by Padmini Donaldson RN  Outcome: Ongoing     Problem: Nutrition  Goal: Optimal nutrition therapy  Outcome: Ongoing

## 2021-10-04 NOTE — CONSULTS
[facility]    CONSULTATION    / FOLLOW UP VISIT       Date:   10/4/2021  Patient name:  Wendie Dove  Date of admission:  9/24/2021  3:48 PM  MRN:   [id number]  Account:  [de-identified]  YOB: 1960  PCP:    Phylicia Morrison  Room:   2608/2608-01  Code Status:    Full Code    Physician Requesting Consult: Richardson Scott MD    History of Present Illness:      C/C ;  Medical comorbidity management     REASON FOR CONSULT;  Medical comorbidity and medication management ;                                                 *Principal Problem:    S/P total knee arthroplasty, bilateral  Active Problems: Morbid obesity ([facility] [address]    History of tobacco use    COPD (chronic obstructive pulmonary disease) ([facility] [address]    Aftercare following bilateral knee joint replacement surgery  Resolved Problems:    * No resolved hospital problems. *           HPI;    S/p bilateral knee arthroplasty   Known to have obstructive sleep apnea   Did have acute exacerbation of COPD  Was seen by Dr. Janett Thomas hypertensive  Patient is complaining of nausea and also have constipation. Denies vomiting, abdominal pain , urinary symptoms  Vitals:    10/03/21 0800 10/03/21 1003 10/03/21 1811 10/04/21 0745   BP: 136/66 119/63 (!) 115/55 118/66   Pulse: 66 66 64 66   Resp: 16  18 16   Temp: 98.2 °F (36.8 °C)  98.2 °F (36.8 °C) 97.8 °F (36.6 °C)   TempSrc: Oral  Oral Oral   SpO2: 94% 97% 95%    Weight:       Height:          The patient is a 64y.o.-year-old with ADL and mobility deficits secondary to knee osteoarthritis s/p bilateral total knee arthroplasty. She will require close medical monitoring for the comorbidities listed below. She will benefit from intensive interdisciplinary therapies and rehab nursing care and is appropriate for inpatient rehabilitation. The post admission physician evaluation (STEPHANIE) is consistent with the pre-admission assessment.   See above findings to disturbance. Respiratory: Positive for cough and shortness of breath. Negative for apnea, choking, chest tightness, wheezing and stridor. Cardiovascular: Positive for leg swelling. Negative for chest pain and palpitations. Gastrointestinal: Positive for constipation and nausea. Negative for abdominal distention and vomiting. Endocrine: Negative for cold intolerance and heat intolerance. Genitourinary: Negative for difficulty urinating and dysuria. Musculoskeletal: Positive for gait problem. Negative for arthralgias and back pain. Neurological: Negative for dizziness, tremors, seizures, syncope, facial asymmetry, speech difficulty, light-headedness, numbness and headaches. Hematological: Negative for adenopathy. Psychiatric/Behavioral: Negative for agitation, behavioral problems, confusion, decreased concentration, dysphoric mood, hallucinations, self-injury, sleep disturbance and suicidal ideas. The patient is not nervous/anxious. All other systems negative                Physical Exam:     Physical Exam   Vitals:    10/03/21 0800 10/03/21 1003 10/03/21 1811 10/04/21 0745   BP: 136/66 119/63 (!) 115/55 118/66   Pulse: 66 66 64 66   Resp: 16  18 16   Temp: 98.2 °F (36.8 °C)  98.2 °F (36.8 °C) 97.8 °F (36.6 °C)   TempSrc: Oral  Oral Oral   SpO2: 94% 97% 95%    Weight:       Height:                       Body mass index is 46.09 kg/m². General Appearance:   -, CO-OPERATIVE ,                                                        Pulmonary/Chest:        Clear to auscultation bilaterally . No wheezes, rales or rhonchi . Cardiovascular:            Normal rate, regular rhythm,                                          No murmur or  Gallop . Abdomen:                       Soft, non-tender                                                                                    Extremities:                    No Edema . Neuromuskuloskeletal    ... Data:     URINE ANALYSIS: No results found for: LABURIN     CBC:  Lab Results   Component Value Date    WBC 9.8 09/30/2021    HGB 13.0 09/30/2021     09/30/2021        BMP:    Lab Results   Component Value Date     09/30/2021    K 4.4 09/30/2021     09/30/2021    CO2 30 09/30/2021    BUN 13 09/30/2021    CREATININE 0.61 09/30/2021    GLUCOSE 106 09/30/2021      LIVER PROFILE:No results found for: ALT, AST, PROT, BILITOT, BILIDIR, LABALBU          Radiology:         Medications: Allergies:  No Known Allergies    Current Meds:   Scheduled Meds:    docusate sodium  100 mg Oral BID    senna  2 tablet Oral Nightly    aspirin  81 mg Oral BID    amLODIPine  10 mg Oral Daily    ipratropium-albuterol  1 ampule Inhalation 4x daily    lisinopril  20 mg Oral Daily    polyethylene glycol  17 g Oral Daily     Continuous Infusions:   PRN Meds: aluminum & magnesium hydroxide-simethicone, ondansetron, oxyCODONE **OR** [DISCONTINUED] oxyCODONE, calcium carbonate, magnesium hydroxide, acetaminophen, bisacodyl        Assessment :       Assessment Dx  Principal Problem:    S/P total knee arthroplasty, bilateral  Active Problems: Morbid obesity ([facility] [address]    History of tobacco use    COPD (chronic obstructive pulmonary disease) ([facility] [address]    Aftercare following bilateral knee joint replacement surgery  Resolved Problems:    * No resolved hospital problems.  *              Plan:     Patient s/p bilateral knee arthroplasty   Has advanced COPD obstructive sleep apnea morbid obesity         10/4/2021    Bilateral knee osteoarthritis s/p arthroplasty:  -Continue physical therapy  -Analgesics for pain control    Hypertension:  -Continue amlodipine 10 mg daily and lisinopril 20 mg daily  -Blood pressure is optimally controlled    COPD, not currently in exacerbation:  -Continue DuoNeb nebulizations 4 times daily  -BiPAP    Constipation:  -Continue Senokot 1 tablet 2 times daily, GlycoLax 17 g orally daily  -If no bowel movement by tomorrow, will consider enema  -Antiemetics as needed             Thanks for consulting us . Will monitor vitals and clinical course , and  Optimize therapy  as needed . Inocencia Nino MD       Copy sent to Dr. Sruthi Corbett that this chart was generated using voice recognition Dragon dictation software. Although every effort was made to ensure the accuracy of this automated transcription, some errors in transcription may have occurred.

## 2021-10-04 NOTE — PLAN OF CARE
Problem: Falls - Risk of:  Goal: Will remain free from falls  Description: Will remain free from falls  10/4/2021 0405 by Olivia Dooley RN  Outcome: Ongoing     Problem: Falls - Risk of:  Goal: Absence of physical injury  Description: Absence of physical injury  10/4/2021 0405 by Olivia Dooley RN  Outcome: Ongoing     Problem: Skin Integrity:  Goal: Will show no infection signs and symptoms  Description: Will show no infection signs and symptoms  10/4/2021 0405 by Olivia Dooley RN  Outcome: Ongoing     Problem: Skin Integrity:  Goal: Absence of new skin breakdown  Description: Absence of new skin breakdown  10/4/2021 0405 by Olivia Dooley RN  Outcome: Ongoing     Problem: Pain:  Goal: Pain level will decrease  Description: Pain level will decrease  10/4/2021 0405 by Olivia Dooley RN  Outcome: Ongoing     Problem: Pain:  Goal: Control of acute pain  Description: Control of acute pain  10/4/2021 0405 by Olivia Dooley RN  Outcome: Ongoing     Problem: Pain:  Goal: Control of chronic pain  Description: Control of chronic pain  10/4/2021 0405 by Olivia Dooley RN  Outcome: Ongoing     Problem: Musculor/Skeletal Functional Status  Goal: Highest potential functional level  10/4/2021 0405 by Olivia Dooley RN  Outcome: Ongoing     Problem: Musculor/Skeletal Functional Status  Goal: Absence of falls  10/4/2021 0405 by Olivia Dooley RN  Outcome: Ongoing

## 2021-10-04 NOTE — PROGRESS NOTES
[address]   ACUTE REHABILITATION OCCUPATIONAL THERAPY  DAILY NOTE    Date: 10/4/21  Patient Name: Aimee Corado      Room: 0810/5080-72    MRN: [id number]   : 1960  (64 y.o.)  Gender: female   Referring Practitioner: Jennifer Mitchell MD/Dr. Anne Granados  Diagnosis: B TKA 21       Restrictions  Restrictions/Precautions: Weight Bearing, Surgical Protocols, Fall Risk  Implants present? : Metal implants  Other position/activity restrictions: FWBAT RLE/LLE  Right Lower Extremity Weight Bearing: Weight Bearing As Tolerated  Left Lower Extremity Weight Bearing: Weight Bearing As Tolerated  Required Braces or Orthoses?: No  Equipment Used: Bed, Wheelchair    Subjective  Subjective: Pt reports upset she hasn't received pain meds yet upon entry, JOSE Patino arrived shortly after. Pt also reports another night of poor sleep. Comments: Pt pleasant and cooperative for session. Patient Currently in Pain: Yes  Pain Level: 5  Pain Location: Knee  Pain Orientation: Right;Left  Restrictions/Precautions: Weight Bearing;Surgical Protocols; Fall Risk  Overall Orientation Status: Within Functional Limits     Pain Assessment  Pain Assessment: 0-10  Pain Level: 5  Pain Type: Acute pain, Surgical pain  Pain Location: Knee  Pain Orientation: Right, Left    Objective  Cognition  Overall Cognitive Status: WFL  Perception  Overall Perceptual Status: WFL  Balance  Sitting Balance: Independent  Standing Balance: Modified independent   Bed mobility  Supine to Sit: Modified independent  Transfers  Sit to stand: Supervision  Stand to sit: Supervision     Functional Mobility  Functional - Mobility Device: Rolling Walker  Activity: To/from bathroom;Transport items  Assist Level: Modified independent   Functional Mobility Comments: Pt reports she will be using her 4WW at home, reports she was using prior to sx, has increased independence with trasnport of items and ability to sit prn safely.  Per clinical reasoning, pt would be safe with 4WW device. Tub Transfers  Tub - Transfer From: Walker  Tub - Transfer Type: To and From  Tub - Transfer To: Transfer tub bench  Tub - Technique: Ambulating  Tub Transfers: Supervision  Tub Transfers Comments: improved technique and comfort with faciliated transfer. Light Housekeeping  Light Housekeeping: Pt reports no concerns with getting around kitchen, did discuss bringing items to midline level for energy conservation techniques. ADL  Grooming: Modified independent   UE Bathing: None  LE Bathing: None  UE Dressing: Modified independent   LE Dressing: Minimal assistance (Assist with B TEDs)  Toileting: Modified independent   Additional Comments: Pt states she will not have anyone to assist her in donning or doffing TEDs, writer provided encouragement and other recommendations for swelling reduction techniques     Light Housekeeping  Light Housekeeping: Pt reports no concerns with getting around kitchen, did discuss bringing items to midline level for energy conservation techniques. Assessment  Performance deficits / Impairments: Decreased functional mobility ; Decreased ADL status; Decreased ROM; Decreased strength;Decreased safe awareness;Decreased endurance;Decreased balance;Decreased high-level IADLs;Decreased coordination  Prognosis: Good  Discharge Recommendations: Home with assist PRN  Activity Tolerance: Patient Tolerated treatment well  Safety Devices in place: Yes  Type of devices: Call light within reach; Left in chair  Equipment Recommendations  Equipment Needed: Yes  Transfer Tub Bench: x  Reacher: x  Sock Aid: x  Other: long handled sponge          Plan  Plan  Times per week: 900/7  Times per day: Twice a day  Current Treatment Recommendations: Strengthening, ROM, Balance Training, Functional Mobility Training, Endurance Training, Pain Management, Safety Education & Training, Patient/Caregiver Education & Training, Equipment Evaluation, Education, & procurement, Self-Care / ADL, Home Management Training  Patient Goals   Patient goals : To return to independence  Short term goals  Time Frame for Short term goals: One Week  Short term goal 1: Pt will complete UB bathing/dressing at Mod I level. (goal met)  Short term goal 2: Pt will complete LB bathing/dressing with Mod A and Good safety using AE as needed. (goal met for home level)  Short term goal 3: Pt will complete toilet transfer and toileting tasks with Min A and Good safety using least restrictive device. (goal met)  Short term goal 4: Pt will tolerate standing for 5+ minutes with 1-2 UE support, SBA, and no LOB while completing a functional task. (goal met)  Short term goal 5: Pt will actively participate in 30 minutes of therapeutic exercise/activity to promote increased independence and safety with self-care and mobility.(goal met)  Long term goals  Time Frame for Long term goals : By Discharge  Long term goal 1: Pt will complete BADL's with Mod I and Good safety using AE as needed. (goal met)  Long term goal 2: Pt will complete functional transfers with Mod I and Good safety using least restrictive device. (goal met)  Long term goal 3: Pt will tolerate standing for 10 minutes with 1-2 UE support and no LOB while completing a functional task. Long term goal 4: Pt will complete light housekeeping/simple meal prep with SBA and Good safety using least restrictive device. (goal met)  Long term goal 5: Pt will V/D at least 3 non-pharmacological pain mgt techniques and 3 EC/WS strategies that she can utilize during home routines.         10/04/21 0930 10/04/21 1321   OT Individual Minutes   Time In 0830  --    Time Out 0929  --    Minutes 59  --    Minute Variance   Variance  --  31   Reason  --  Refusal  (politely declines due to fatigue. )     Electronically signed by NICOLÁS Alexandre on 10/4/21 at 1:26 PM EDT

## 2021-10-04 NOTE — PROGRESS NOTES
Physical Medicine & Rehabilitation  Progress Note      Subjective:      64year-old female with B TKA. Patient is having problems with pain in the B knees, requiring pain medications    ROS:  Denies fevers, chills, sweats. No chest pain, palpitations, lightheadedness. Denies coughing, wheezing or shortness of breath. Denies abdominal pain, nausea, diarrhea or constipation. No new areas of joint pain. Denies new areas of numbness or weakness. Denies new anxiety or depression issues. No new skin problems. Rehabilitation:   Progressing in therapies. PT:  Restrictions/Precautions: Weight Bearing, Surgical Protocols, Fall Risk  Implants present? : Metal implants  Other position/activity restrictions: FWBAT RLE/LLE  Right Lower Extremity Weight Bearing: Weight Bearing As Tolerated  Left Lower Extremity Weight Bearing: Weight Bearing As Tolerated   Transfers  Sit to Stand: Stand by assistance  Stand to sit: Stand by assistance  Bed to Chair: Stand by assistance  Stand Pivot Transfers: Stand by assistance  Comment: slow guarded movements,  WB Status: FWBAT BLEs  Ambulation 1  Surface: level tile  Device: Rolling Walker  Other Apparatus: O2 (2)  Assistance: Stand by assistance  Quality of Gait: Very slow & antalgic gait, Wide LUX, pt moves slowly/cautiously with dec bilateral step height and length.  Heavy BUEs WB on RW  Gait Deviations: Slow Janet, Increased LUX, Decreased step length, Decreased step height  Distance: 152ft AM 121ft PM; short disyances within gym   Comments: patient tolerance to fatigue reported    Transfers  Sit to Stand: Stand by assistance  Stand to sit: Stand by assistance  Bed to Chair: Stand by assistance  Stand Pivot Transfers: Stand by assistance  Comment: slow guarded movements,  Ambulation  Ambulation?: Yes  WB Status: FWBAT BLEs  Ambulation 1  Surface: level tile  Device: Rolling Walker  Other Apparatus: O2 (2)  Assistance: Stand by assistance  Quality of Gait: Very slow & antalgic to assess  Rolling to Right: Unable to assess  Supine to Sit: Stand by assistance  Sit to Supine: Moderate assistance (assisted with B LEs)  Scooting: Contact guard assistance  Comment: HOB elevated  Transfers  Stand Step Transfers: Stand by assistance  Stand Pivot Transfers: Stand by assistance  Sit to stand: Supervision, Stand by assistance  Stand to sit: Supervision, Stand by assistance  Transfer Comments: pt demonstrates good hand placement. Toilet Transfers  Toilet - Technique: Ambulating  Equipment Used: Raised toilet seat with rails  Toilet Transfer: Stand by assistance  Toilet Transfers Comments: w/RW (per report this date. )  Tub Transfers  Tub - Transfer From: Walker  Tub - Transfer Type: To and From  Tub - Transfer To: Transfer tub bench  Tub - Technique: Ambulating  Tub Transfers: Contact guard, Stand by assistance  Tub Transfers Comments: Faciliated dry transfer completed in PM to ensurse good safety and technique for home transfer; Increased time, slightly anxious with trunk pivot due to decreased positioning control on edge of bench. Pt able to lift BLEs over edge of tub with minimal difficulty and increased time for rest break. Pt agreeable to further practice prior to DC to increase comfort in technique. Shower Transfers  Shower - Transfer From: [facility] (RW)  Shower - Transfer Type: To and From  Shower - Transfer To:  Transfer tub bench  Shower - Technique: Ambulating  Shower Transfers: Stand by assistance  Shower Transfers Comments: Pt ambulated into shower with CGA and RW; Stand pivot to w/c after shower 2* fatigue, completed transfer with GB and SBA  Wheelchair Bed Transfers  Wheelchair/Bed - Technique: Stand pivot  Equipment Used: Bed, Wheelchair  Level of Asssistance: Minimal assistance  Wheelchair Transfers Comments: Min A to stand from EOB, CGA for pivot with RW    SPEECH:      Objective:  BP (!) 115/55   Pulse 64   Temp 98.2 °F (36.8 °C) (Oral)   Resp 18   Ht 5' 2\" (1.575 m)   Wt 252 lb (114.3 kg)   SpO2 95%   BMI 46.09 kg/m²       GEN: well developed, well nourished, NAD  HEENT: NCAT, PERRL, EOMI, mucous membranes pink and moist  CV: RRR, no murmurs, rubs or gallops  PULM: CTAB, no rales or rhonchi. Respirations WNL and unlabored  ABD: soft, NT, ND, BS+ and equal  NEURO: A&O x3. Sensation intact to light touch. MSK: Functional ROM to approximately 90 degrees B knee flexion . Strength 4+/5 key muscles B hip flexion and knee extension  EXTREMITIES: No calf tenderness to palpation bilaterally. Post-op edema BLEs  SKIN: warm dry and intact with good turgor. B anterior knee incisions with aquacel dressings in place with dried drainage noted - no apparent new or active drainage  PSYCH: appropriately interactive. Affect WNL. Diagnostics:     CBC: No results for input(s): WBC, RBC, HGB, HCT, MCV, RDW, PLT in the last 72 hours. BMP: No results for input(s): NA, K, CL, CO2, PHOS, BUN, CREATININE, GLUCOSE in the last 72 hours. Invalid input(s): CA  BNP: No results for input(s): BNP in the last 72 hours. PT/INR: No results for input(s): PROTIME, INR in the last 72 hours. APTT: No results for input(s): APTT in the last 72 hours. CARDIAC ENZYMES: No results for input(s): CKMB, CKMBINDEX, TROPONINT in the last 72 hours. Invalid input(s): CKTOTAL;3  FASTING LIPID PANEL:No results found for: CHOL, HDL, TRIG  LIVER PROFILE: No results for input(s): AST, ALT, ALB, BILIDIR, BILITOT, ALKPHOS in the last 72 hours.      Current Medications:   Current Facility-Administered Medications: docusate sodium (COLACE) capsule 100 mg, 100 mg, Oral, BID  senna (SENOKOT) tablet 17.2 mg, 2 tablet, Oral, Nightly  aluminum & magnesium hydroxide-simethicone (MAALOX) 200-200-20 MG/5ML suspension 30 mL, 30 mL, Oral, Q6H PRN  ondansetron (ZOFRAN-ODT) disintegrating tablet 4 mg, 4 mg, Oral, Q6H PRN  oxyCODONE (ROXICODONE) immediate release tablet 5 mg, 5 mg, Oral, Q6H PRN **OR** [DISCONTINUED] oxyCODONE HCl (OXY-IR) immediate release tablet 10 mg, 10 mg, Oral, Q6H PRN  calcium carbonate (TUMS) chewable tablet 500 mg, 500 mg, Oral, TID PRN  aspirin EC tablet 81 mg, 81 mg, Oral, BID  magnesium hydroxide (MILK OF MAGNESIA) 400 MG/5ML suspension 30 mL, 30 mL, Oral, Daily PRN  amLODIPine (NORVASC) tablet 10 mg, 10 mg, Oral, Daily  ipratropium-albuterol (DUONEB) nebulizer solution 1 ampule, 1 ampule, Inhalation, 4x daily  lisinopril (PRINIVIL;ZESTRIL) tablet 20 mg, 20 mg, Oral, Daily  acetaminophen (TYLENOL) tablet 650 mg, 650 mg, Oral, Q4H PRN  polyethylene glycol (GLYCOLAX) packet 17 g, 17 g, Oral, Daily  bisacodyl (DULCOLAX) suppository 10 mg, 10 mg, Rectal, Daily PRN      Impression/Plan:   Impaired ADLs, gait, and mobility due to:      1. B TKA: PT/OT  for gait, mobility, strengthening, endurance, ADLs, and self care. WBAT. Has Tylenol and oxycodone prn.  2. Chronic urine incontinence  3. HTN: on amlodipine and lisinopril  4. COPD/KEVIN: has DuoNebs. For PFT as outpatient. She declined Bipap at hs. Requiring oxygen at hs. She declines nocturnal oxygen study and requests to pursue any follow up with her PCP. 5. Depression/Anxiety: stable without medication  6. Morbid obesity: BMI 46.09. Dietitian following  7. Bowel Management: Miralax daily, Senokot prn, Dulcolax prn  8. Internal medicine for medical management  9. DVT prophylaxis:  On ASA BID  10. Achieving therapy goals. Anticipate discharge home tomorrow. Follow up PCP 1-2 weeks, Dr. Andreia Garcia 1 week. Electronically signed by Veronica Maza MD on 10/4/2021 at 8:54 AM      This note is created with the assistance of a speech recognition program.  While intending to generate a document that actually reflects the content of the visit, the document can still have some errors including those of syntax and sound a like substitutions which may escape proof reading. In such instances, actual meaning can be extrapolated by contextual diversion.

## 2021-10-04 NOTE — PROGRESS NOTES
Physical Therapy  Facility/Department: [facility]  Daily Treatment Note  NAME: Wendie Dove  : 1960  MRN: [id number]    Date of Service: 10/4/2021    Discharge Recommendations:  Patient would benefit from continued therapy after discharge        Assessment      PT Education: Disease Specific Education; Functional Mobility Training  Patient Education: BLE TKR knee ROM Therex to improve overall flexion extension  Activity Tolerance  Activity Tolerance: Patient limited by fatigue;Patient limited by pain     Patient Diagnosis(es): There were no encounter diagnoses. has a past medical history of Anxiety, Arthritis, Depression, Hypertension, Knee pain, Murmur, PONV (postoperative nausea and vomiting), and Sleep apnea. has a past surgical history that includes Rotator cuff repair (Left); tumor removal; Ulnar tunnel release (Right); skin biopsy; eye surgery (Left, 2018); and knee surgery (Bilateral, 2021). Restrictions  Restrictions/Precautions  Restrictions/Precautions: Weight Bearing, Surgical Protocols, Fall Risk  Required Braces or Orthoses?: No  Implants present? : Metal implants  Lower Extremity Weight Bearing Restrictions  Right Lower Extremity Weight Bearing: Weight Bearing As Tolerated  Left Lower Extremity Weight Bearing: Weight Bearing As Tolerated  Position Activity Restriction  Other position/activity restrictions: FWBAT RLE/LLE  Subjective   General  Chart Reviewed: Yes  Additional Pertinent Hx: pt is a 64 y.o. woman recently admitted to hospital for Bilateral TKA procedure on 21 by Dr. Jeni Trejo. Pt with initial diagnosis of Bilateral knee Degenerative Joint Disease and H/O osteoarthritis, sleep apnea (no machine), and HTN.  Admitted to [facility] on 21  Family / Caregiver Present: No  Pain Assessment  Pain Level: 5  Pain Type: Acute pain;Surgical pain  Pain Location: Leg  Pain Orientation: Right       Orientation     Cognition      Objective   Bed mobility  Bridging: Modified independent   Rolling to Left: Modified independent  Rolling to Right: Modified independent  Supine to Sit: Modified independent  Sit to Supine: Modified independent  Scooting: Modified independent  Transfers  Stand to sit: Modified independent  Bed to Chair: Modified independent  Stand Pivot Transfers: Modified independent  Comment: use of personal rollator. demonstrated placement of brakes during transfers independently  Ambulation 1  Surface: level tile  Device: Rollator  Assistance: Modified Independent  Quality of Gait: steady gait today with increased eleonora. Distance: 2MWT 166 feet; 191 feet overall tolerated  Comments: patient tolerance to fatigue reported  Stairs  # Steps : 2 (also preformed 3- 4\" rise steps)  Stairs Height: 6\"  Rails: Bilateral  Curbs: 6\"  Device: 4 wheeled walker (SBA)  Assistance: Modified independent         Other exercises  Other exercises 1: seated B LE exercises x20 reps with orange tband   Other exercises 2: standing B LE hip flexion x15 reps with seated rest breaks in between. Became lightheaded afetr performing, requested to end AM session.  Took /63  Other exercises 3: NuStep L3 x10 minutes @ seat 9  Other exercises 4: (B) seated heel slides working on ROM      Strength RLE  Comment: Grossly 4/5 except ankle 4-/5  - limited by in pain                 G-Code     OutComes Score                                                     AM-PAC Score             Goals  Short term goals  Time Frame for Short term goals: 1 week  Short term goal 1: pt to demonstrate Bilateral Knee AROM of 90 degrees knee flexion to demo improved joint mechanics for safe functional mobility   Short term goal 2: pt to improve overall BLE strength by 1/2 manual muscle grade to improve strength and safety during functional transfers and mobility  Short term goal 3: pt to improve dynamic standing balance with RW from fair to fair+ to improve safety with functional mobility   Short term goal 4: pt to ambulate 48' & 2 turns with RW and CGA to improve mobility for household distances  Short term goal 5: pt to improve Bilat knee extension AROM to 0 degrees to improve joint mechanics for safe amb and transfers  Short term goal 6: pt to perform all bed mobility on flat bed with or w/o rails and MinAx1 to decrease burden of care  Long term goals  Time Frame for Long term goals : Until d/c  Long term goal 1: pt to demonstrate Bilateral Knee AROM of 105 degrees knee flexion to demo improved joint mechanics for safe functional mobility   Long term goal 2: pt to improve overall BLE strength to 4/5 to improve strength and safety during functional transfers and mobility  Long term goal 3: pt to ambulate 100' with RW Mod I to improve safety and tolerance to amb household distances  Long term goal 4: pt to demo negotiation of 10' ramp with RW and 1 step with SBA to allow for safe home access  Long term goal 5: pt to perform all bed mobility on flat bed with or w/o rails Mod I to decrease burden of care  Long term goal 6: pt to independently perform HEP strengthening and ROM with good technique to demo indepence with self-care routine  Long term goal 7: pt to amb 76' with RW in 2 minutes to demo improved gait speed to improve safety with household ambulation  Patient Goals   Patient goals : to return home safely    Plan    Plan  Times per week: 900 minutes per week of combined PT and OT  Times per day: Twice a day  Plan weeks: 1-2 weeks  Specific instructions for Next Treatment: transfer pt to sitting up in comfortable chair, seated ROM exercises for Bilateral knees. Current Treatment Recommendations: Strengthening, ROM, Balance Training, Functional Mobility Training, Transfer Training, Endurance Training, Gait Training, Stair training, Pain Management, Home Exercise Program, Safety Education & Training, Patient/Caregiver Education & Training, Equipment Evaluation, Education, & procurement  Safety Devices  Type of devices:  All fall risk precautions in place, Call light within reach, Left in bed  Restraints  Initially in place: No     Therapy Time     10/04/21 0946 10/04/21 1442   PT Individual Minutes   Time In 0946 1340   Time Out 1050 1415   Minutes 64 Mahes[address], PTA

## 2021-10-04 NOTE — PROGRESS NOTES
Monitoring and Evaluation:   Behavioral-Environmental Outcomes:  None Identified   Food/Nutrient Intake Outcomes:  Food and Nutrient Intake  Physical Signs/Symptoms Outcomes:  Biochemical Data, GI Status, Nausea or Vomiting, Fluid Status or Edema, Nutrition Focused Physical Findings, Skin, Weight     Discharge Planning: Too soon to determine     Some areas of assessment may be incomplete due to standard COVID-19 Precautions. Flakito Pederson R.D., L.D.   Phone: [phone]

## 2021-10-04 NOTE — CARE COORDINATION
Spoke with Lexii Franco at [facility]; they are able to accept. Pt notified and MARIAN updated. Pt does not want to see any pulmonologist in this area and prefers to discuss with her PCP for a local one. Dr. Monalisa Ratliff updated.

## 2021-10-05 VITALS
HEART RATE: 62 BPM | RESPIRATION RATE: 14 BRPM | SYSTOLIC BLOOD PRESSURE: 123 MMHG | HEIGHT: 62 IN | DIASTOLIC BLOOD PRESSURE: 55 MMHG | TEMPERATURE: 98.5 F | OXYGEN SATURATION: 96 % | WEIGHT: 252 LBS | BODY MASS INDEX: 46.38 KG/M2

## 2021-10-05 PROCEDURE — 99238 HOSP IP/OBS DSCHRG MGMT 30/<: CPT | Performed by: PHYSICAL MEDICINE & REHABILITATION

## 2021-10-05 PROCEDURE — 97110 THERAPEUTIC EXERCISES: CPT

## 2021-10-05 PROCEDURE — 6370000000 HC RX 637 (ALT 250 FOR IP): Performed by: PHYSICAL MEDICINE & REHABILITATION

## 2021-10-05 PROCEDURE — 97530 THERAPEUTIC ACTIVITIES: CPT

## 2021-10-05 PROCEDURE — 99232 SBSQ HOSP IP/OBS MODERATE 35: CPT | Performed by: INTERNAL MEDICINE

## 2021-10-05 PROCEDURE — 97116 GAIT TRAINING THERAPY: CPT

## 2021-10-05 PROCEDURE — 6370000000 HC RX 637 (ALT 250 FOR IP): Performed by: ORTHOPAEDIC SURGERY

## 2021-10-05 PROCEDURE — 97535 SELF CARE MNGMENT TRAINING: CPT

## 2021-10-05 RX ORDER — IPRATROPIUM BROMIDE AND ALBUTEROL SULFATE 2.5; .5 MG/3ML; MG/3ML
1 SOLUTION RESPIRATORY (INHALATION) EVERY 4 HOURS PRN
Status: DISCONTINUED | OUTPATIENT
Start: 2021-10-05 | End: 2021-10-05 | Stop reason: HOSPADM

## 2021-10-05 RX ADMIN — AMLODIPINE BESYLATE 10 MG: 10 TABLET ORAL at 08:02

## 2021-10-05 RX ADMIN — OXYCODONE HYDROCHLORIDE 5 MG: 5 TABLET ORAL at 14:13

## 2021-10-05 RX ADMIN — LISINOPRIL 20 MG: 20 TABLET ORAL at 08:02

## 2021-10-05 RX ADMIN — ASPIRIN 81 MG: 81 TABLET, COATED ORAL at 08:02

## 2021-10-05 RX ADMIN — OXYCODONE HYDROCHLORIDE 5 MG: 5 TABLET ORAL at 08:02

## 2021-10-05 RX ADMIN — DOCUSATE SODIUM 100 MG: 100 CAPSULE, LIQUID FILLED ORAL at 08:02

## 2021-10-05 ASSESSMENT — PAIN SCALES - GENERAL
PAINLEVEL_OUTOF10: 4
PAINLEVEL_OUTOF10: 5
PAINLEVEL_OUTOF10: 4

## 2021-10-05 ASSESSMENT — PAIN DESCRIPTION - ORIENTATION: ORIENTATION: RIGHT;LEFT

## 2021-10-05 ASSESSMENT — PAIN DESCRIPTION - PAIN TYPE: TYPE: ACUTE PAIN;SURGICAL PAIN

## 2021-10-05 ASSESSMENT — PAIN DESCRIPTION - DESCRIPTORS: DESCRIPTORS: ACHING

## 2021-10-05 ASSESSMENT — PAIN DESCRIPTION - LOCATION: LOCATION: KNEE

## 2021-10-05 NOTE — PLAN OF CARE
Problem: Falls - Risk of:  Goal: Will remain free from falls  Description: Will remain free from falls  10/5/2021 0322 by Agustin Pina RN  Outcome: Ongoing     Problem: Skin Integrity:  Goal: Will show no infection signs and symptoms  Description: Will show no infection signs and symptoms  10/5/2021 0322 by Agustin Pina RN  Outcome: Ongoing     Problem: Pain:  Goal: Pain level will decrease  Description: Pain level will decrease  10/5/2021 0322 by Agustin Pina RN  Outcome: Ongoing

## 2021-10-05 NOTE — PLAN OF CARE
Problem: Falls - Risk of:  Goal: Will remain free from falls  Description: Will remain free from falls  10/5/2021 1151 by Yesy Styles RN  Outcome: Completed  10/5/2021 0322 by Minerva Mason RN  Outcome: Ongoing  Goal: Absence of physical injury  Description: Absence of physical injury  Outcome: Completed     Problem: Skin Integrity:  Goal: Will show no infection signs and symptoms  Description: Will show no infection signs and symptoms  10/5/2021 1151 by Yesy Styles RN  Outcome: Completed  10/5/2021 0322 by Minerva Mason RN  Outcome: Ongoing  Goal: Absence of new skin breakdown  Description: Absence of new skin breakdown  Outcome: Completed     Problem: Pain:  Goal: Pain level will decrease  Description: Pain level will decrease  10/5/2021 1151 by Yesy Styles RN  Outcome: Completed  10/5/2021 0322 by Minerva Mason RN  Outcome: Ongoing  Goal: Control of acute pain  Description: Control of acute pain  Outcome: Completed  Goal: Control of chronic pain  Description: Control of chronic pain  Outcome: Completed     Problem: Musculor/Skeletal Functional Status  Goal: Highest potential functional level  Outcome: Completed  Goal: Absence of falls  Outcome: Completed     Problem: Nutrition  Goal: Optimal nutrition therapy  Outcome: Completed

## 2021-10-05 NOTE — PROGRESS NOTES
CLINICAL PHARMACY NOTE: MEDS TO BEDS    Total # of Prescriptions Filled: 2   The following medications were delivered to the patient:  · Aspirin 81mg  · Oxycodone 5mg    Additional Documentation:    Delivered medications to patients room

## 2021-10-05 NOTE — CONSULTS
[facility] Internal Medicine    CONSULTATION    / FOLLOW UP VISIT       Date:   10/5/2021  Patient name:  Rony Mark  Date of admission:  9/24/2021  3:48 PM  MRN:   [id number]  Account:  [de-identified]  YOB: 1960  PCP:    Mahamed Dow  Room:   2608/2608-01  Code Status:    Full Code    Physician Requesting Consult: Alma Rosa Leblanc MD    History of Present Illness:      C/C ;  Medical comorbidity management     REASON FOR CONSULT;  Medical comorbidity and medication management ;                                                 *Principal Problem:    S/P total knee arthroplasty, bilateral  Active Problems: Morbid obesity ([facility] [address]    History of tobacco use    COPD (chronic obstructive pulmonary disease) ([facility] [address]    Aftercare following bilateral knee joint replacement surgery  Resolved Problems:    * No resolved hospital problems. *           HPI;    S/p bilateral knee arthroplasty   Known to have obstructive sleep apnea   Did have acute exacerbation of COPD  Was seen by Dr. Justen Tang hypertensive  Patient is complaining of nausea and also have constipation. Denies vomiting, abdominal pain , urinary symptoms  Vitals:    10/04/21 0745 10/04/21 1755 10/04/21 2148 10/05/21 0630   BP: 118/66 138/73  (!) 123/55   Pulse: 66 64  62   Resp: 16 16  14   Temp: 97.8 °F (36.6 °C) 98.4 °F (36.9 °C)  98.5 °F (36.9 °C)   TempSrc: Oral   Oral   SpO2:  96% 96% 96%   Weight:       Height:          The patient is a 64y.o.-year-old with ADL and mobility deficits secondary to knee osteoarthritis s/p bilateral total knee arthroplasty. She will require close medical monitoring for the comorbidities listed below. She will benefit from intensive interdisciplinary therapies and rehab nursing care and is appropriate for inpatient rehabilitation. The post admission physician evaluation (STEPHANIE) is consistent with the pre-admission assessment.   See above findings to reflect the elements required in the STEPHANIE. Patient's admitting condition is consistent with the findings of the preadmission assessment by the rehabilitation admissions coordinator. Diagnoses/plan:     1. Bilateral knee osteoarthritis s/p bilateral total knee arthroplasty:  Procedure done 9/21/21. WBAT to the bilateral lower limbs. PT/OT for gait, mobility, strengthening, endurance, ADLs, and self care. Pain control with tylenol and oxycodone as needed. Follow up with Dr. Alexandru Davis as outpatient. 2. Bilateral hand cramps:  Patient reports that she has had these intermittently in the past.  Electrolytes WNL. Magnesium is normal.  Heat/ice as needed. Added lidocaine cream as needed for pain. 3. HTN:  On amlodipine, lisinopril  4. COPD:  Per pulmonology. On duo-neb 4 times daily. Will need PFTs as outpatient. 5. KEVIN:  Did not tolerate nocturnal bipap. Continue nasal cannula oxygen at night (at least 2-3 L to keep O2 sat > 88%). Will need sleep study as outpatient. 6. Depression, anxiety:  Not currently on medication  7. Morbid obesity:  BMI 46.09  8. Bowel Management: Miralax daily, senokot prn, dulcolax prn, milk of magnesia prn.  9. DVT Prophylaxis:  aspirin BID  10. Internal Medicine for medical management          Past and Surgical hx as in H and P  Social History:     Tobacco:    reports that she has been smoking. She has been smoking about 1.00 pack per day. She has never used smokeless tobacco.  Alcohol:      reports previous alcohol use. Drug Use:  reports previous drug use. Review of Systems:     POSITIVE AND NEGATIVES AS DESCRIBED IN HISTORY OF PRESENT ILLNESS ;  IN ADDITION ;  Review of Systems   Constitutional: Positive for activity change and appetite change. Negative for chills, diaphoresis, fatigue, fever and unexpected weight change. HENT: Negative for congestion, sore throat, trouble swallowing and voice change. Eyes: Negative for photophobia, discharge, itching and visual disturbance. Respiratory: Positive for cough and shortness of breath. Negative for apnea, choking, chest tightness, wheezing and stridor. Cardiovascular: Positive for leg swelling. Negative for chest pain and palpitations. Gastrointestinal: Positive for constipation and nausea. Negative for abdominal distention and vomiting. Endocrine: Negative for cold intolerance and heat intolerance. Genitourinary: Negative for difficulty urinating and dysuria. Musculoskeletal: Positive for gait problem. Negative for arthralgias and back pain. Neurological: Negative for dizziness, tremors, seizures, syncope, facial asymmetry, speech difficulty, light-headedness, numbness and headaches. Hematological: Negative for adenopathy. Psychiatric/Behavioral: Negative for agitation, behavioral problems, confusion, decreased concentration, dysphoric mood, hallucinations, self-injury, sleep disturbance and suicidal ideas. The patient is not nervous/anxious. All other systems negative                Physical Exam:     Physical Exam   Vitals:    10/04/21 0745 10/04/21 1755 10/04/21 2148 10/05/21 0630   BP: 118/66 138/73  (!) 123/55   Pulse: 66 64  62   Resp: 16 16  14   Temp: 97.8 °F (36.6 °C) 98.4 °F (36.9 °C)  98.5 °F (36.9 °C)   TempSrc: Oral   Oral   SpO2:  96% 96% 96%   Weight:       Height:                       Body mass index is 46.09 kg/m². General Appearance:   -, CO-OPERATIVE ,                                                        Pulmonary/Chest:        Clear to auscultation bilaterally . No wheezes, rales or rhonchi . Cardiovascular:            Normal rate, regular rhythm,                                          No murmur or  Gallop . Abdomen:                       Soft, non-tender                                                                                    Extremities:                    No Edema . movement by tomorrow, will consider enema  -Antiemetics as needed             Thanks for consulting us . Will monitor vitals and clinical course , and  Optimize therapy  as needed . Sydnie Willett MD       Copy sent to Dr. Agnes Cavanaugh that this chart was generated using voice recognition Dragon dictation software. Although every effort was made to ensure the accuracy of this automated transcription, some errors in transcription may have occurred.

## 2021-10-05 NOTE — PROGRESS NOTES
[address]   ACUTE REHABILITATION OCCUPATIONAL THERAPY  DAILY NOTE    Date: 10/5/21  Patient Name: Cookie Botello      Room: 2259/5534-12    MRN: [id number]   : 1960  (64 y.o.)  Gender: female   Referring Practitioner: Cielo Jarrett MD/Dr. Jeffry Carpenter  Diagnosis: B TKA 21       Restrictions  Restrictions/Precautions: Weight Bearing, Surgical Protocols, Fall Risk  Implants present? : Metal implants  Other position/activity restrictions: FWBAT RLE/LLE  Right Lower Extremity Weight Bearing: Weight Bearing As Tolerated  Left Lower Extremity Weight Bearing: Weight Bearing As Tolerated  Required Braces or Orthoses?: No  Equipment Used: Bed, Wheelchair    Subjective  Subjective: Pt reports eager and ready for discharge today. Comments: Pt pleasant and cooperative for session. Patient Currently in Pain: Yes (recently received pain meds. )  Pain Level: 4  Pain Location: Knee  Pain Orientation: Right;Left  Restrictions/Precautions: Weight Bearing;Surgical Protocols; Fall Risk  Overall Orientation Status: Within Functional Limits     Pain Assessment  Pain Assessment: 0-10  Pain Level: 4  Pain Type: Acute pain, Surgical pain  Pain Location: Knee  Pain Orientation: Right, Left  Pain Descriptors: Aching    Objective  Cognition  Overall Cognitive Status: WFL  Perception  Overall Perceptual Status: WFL  Balance  Sitting Balance: Independent  Standing Balance: Modified independent   Transfers  Sit to stand: Modified independent  Stand to sit: Modified independent     Functional Mobility  Functional - Mobility Device: 4-Wheeled Walker  Activity: Retrieve items;Transport items; To/from bathroom  Assist Level: Modified independent   Toilet Transfers  Toilet - Technique: Ambulating  Equipment Used: Raised toilet seat without rails  Toilet Transfer: Modified independent  Toilet Transfers Comments: with 4WW  Shower Transfers  Shower - Transfer From: Walker (8DI)  Shower - Transfer Type:  To and From  Shower - Transfer To: Transfer tub bench  Shower - Technique: Ambulating  Shower Transfers: Modified independence  Tub Transfers  Tub Transfers Comments: Insurance is providing pt with transfer tub bench for home. ADL  Feeding: Modified independent   Grooming: Modified independent   UE Bathing: Modified independent   LE Bathing: Modified independent   UE Dressing: Modified independent   LE Dressing: Modified independent  (including shoes this date; provided [facility] & elastic laces. Increased time due to decreased LE ROM however manages well overall. Pt reports will mostly wear slip on sandals at home.)  Toileting: Modified independent   Additional Comments: Pt declined wearing TEDs this date because she will not have anyone to assist her in taking them off once home later today. Writer reinforced compensatory techniques for edema management. Assessment  Performance deficits / Impairments: Decreased functional mobility ; Decreased ADL status; Decreased ROM; Decreased strength;Decreased safe awareness;Decreased endurance;Decreased balance;Decreased high-level IADLs;Decreased coordination  Prognosis: Good  Discharge Recommendations: Home with assist PRN  Activity Tolerance: Patient Tolerated treatment well  Safety Devices in place: Not Applicable (Pt mod I in room )  Equipment Recommendations  Equipment Needed: Yes  Transfer Tub Bench: x  Reacher: x  Sock Aid: x  Other: long handled sponge        Plan  Plan  Times per week: 900/7  Times per day: Twice a day  Current Treatment Recommendations: Strengthening, ROM, Balance Training, Functional Mobility Training, Endurance Training, Pain Management, Safety Education & Training, Patient/Caregiver Education & Training, Equipment Evaluation, Education, & procurement, Self-Care / ADL, Home Management Training  Patient Goals   Patient goals : To return to independence  Short term goals  Time Frame for Short term goals:  One Week  Short term goal 1: Pt will complete UB bathing/dressing at Mod I level. Short term goal 2: Pt will complete LB bathing/dressing with Mod A and Good safety using AE as needed. Short term goal 3: Pt will complete toilet transfer and toileting tasks with Min A and Good safety using least restrictive device. Short term goal 4: Pt will tolerate standing for 5+ minutes with 1-2 UE support, SBA, and no LOB while completing a functional task. Short term goal 5: Pt will actively participate in 30 minutes of therapeutic exercise/activity to promote increased independence and safety with self-care and mobility. Long term goals  Time Frame for Long term goals : By Discharge  Long term goal 1: Pt will complete BADL's with Mod I and Good safety using AE as needed. Long term goal 2: Pt will complete functional transfers with Mod I and Good safety using least restrictive device. Long term goal 3: Pt will tolerate standing for 10 minutes with 1-2 UE support and no LOB while completing a functional task. Long term goal 4: Pt will complete light housekeeping/simple meal prep with SBA and Good safety using least restrictive device. Long term goal 5: Pt will V/D at least 3 non-pharmacological pain mgt techniques and 3 EC/WS strategies that she can utilize during home routines.         10/05/21 1055   OT Individual Minutes   Time In 4564   Time Out 0945   Minutes 70     Electronically signed by NICOLÁS Alexandre on 10/5/21 at 11:02 AM EDT

## 2021-10-05 NOTE — PROGRESS NOTES
Physical Therapy  Facility/Department: [facility] ACUTE REHAB  Daily Treatment Note  NAME: Brittaney Casiano  : 1960  MRN: [id number]    Date of Service: 10/5/2021    Discharge Recommendations:  Patient would benefit from continued therapy after discharge        Assessment      Patient Education: BLE TKR knee ROM Therex to improve overall flexion extension  Activity Tolerance  Activity Tolerance: Patient limited by pain     Patient Diagnosis(es): The encounter diagnosis was S/P total knee arthroplasty, bilateral.     has a past medical history of Anxiety, Arthritis, Depression, Hypertension, Knee pain, Murmur, PONV (postoperative nausea and vomiting), and Sleep apnea. has a past surgical history that includes Rotator cuff repair (Left); tumor removal; Ulnar tunnel release (Right); skin biopsy; eye surgery (Left, ); and knee surgery (Bilateral, 2021). Restrictions  Restrictions/Precautions  Restrictions/Precautions: Weight Bearing, Surgical Protocols, Fall Risk  Required Braces or Orthoses?: No  Implants present? : Metal implants  Lower Extremity Weight Bearing Restrictions  Right Lower Extremity Weight Bearing: Weight Bearing As Tolerated  Left Lower Extremity Weight Bearing: Weight Bearing As Tolerated  Position Activity Restriction  Other position/activity restrictions: FWBAT RLE/LLE  Subjective              Orientation     Cognition      Objective   Bed mobility  Bridging: Modified independent   Rolling to Left: Modified independent  Rolling to Right: Modified independent  Supine to Sit: Modified independent  Sit to Supine: Modified independent  Scooting: Modified independent  Transfers  Stand to sit: Modified independent  Bed to Chair: Modified independent  Stand Pivot Transfers: Modified independent  Comment: use of personal rollator.  demonstrated placement of brakes during transfers independently  Ambulation 1  Surface: level tile  Device: Rollator  Assistance: Modified Independent  Quality of Gait: steady gait today with increased eleonora.   Distance: 157 feet x 2  Ambulation 2  Surface - 2: ramp  Device 2: Rolling Walker  Assistance 2: Modified Independent  Distance: 100ft  Stairs  # Steps : 2 (also preformed 3- 4\" rise steps)  Stairs Height: 6\"  Rails: Bilateral  Curbs: 6\"  Device: 4 wheeled walker (SBA)  Assistance: Modified independent   Comment: patient completed step to pattern with no LOB noted        Other exercises  Other exercises 1: seated B LE exercises x20 reps with orange tband   Other exercises 4: (B) seated heel slides working on ROM   AROM RLE (degrees)  RLE General AROM: Seated in chair  8-78º lacking full extension 8 º used stool prop at heel to encourage knee extension  PROM LLE (degrees)  LLE General PROM: Seated in chair 7º - 91º used stool prop at heel to encourage knee extension                    G-Code     OutComes Score                                                     AM-PAC Score             Goals  Short term goals  Time Frame for Short term goals: 1 week  Short term goal 1: pt to demonstrate Bilateral Knee AROM of 90 degrees knee flexion to demo improved joint mechanics for safe functional mobility   Short term goal 2: pt to improve overall BLE strength by 1/2 manual muscle grade to improve strength and safety during functional transfers and mobility  Short term goal 3: pt to improve dynamic standing balance with RW from fair to fair+ to improve safety with functional mobility   Short term goal 4: pt to ambulate 50' & 2 turns with RW and CGA to improve mobility for household distances  Short term goal 5: pt to improve Bilat knee extension AROM to 0 degrees to improve joint mechanics for safe amb and transfers  Short term goal 6: pt to perform all bed mobility on flat bed with or w/o rails and MinAx1 to decrease burden of care  Long term goals  Time Frame for Long term goals : Until d/c  Long term goal 1: pt to demonstrate Bilateral Knee AROM of 105 degrees knee flexion to demo improved joint mechanics for safe functional mobility   Long term goal 2: pt to improve overall BLE strength to 4/5 to improve strength and safety during functional transfers and mobility  Long term goal 3: pt to ambulate 100' with RW Mod I to improve safety and tolerance to amb household distances  Long term goal 4: pt to demo negotiation of 10' ramp with RW and 1 step with SBA to allow for safe home access  Long term goal 5: pt to perform all bed mobility on flat bed with or w/o rails Mod I to decrease burden of care  Long term goal 6: pt to independently perform HEP strengthening and ROM with good technique to demo indepence with self-care routine  Long term goal 7: pt to amb 76' with RW in 2 minutes to demo improved gait speed to improve safety with household ambulation  Patient Goals   Patient goals : to return home safely    Plan    Plan  Times per week: 900 minutes per week of combined PT and OT  Times per day: Twice a day  Plan weeks: 1-2 weeks  Specific instructions for Next Treatment: transfer pt to sitting up in comfortable chair, seated ROM exercises for Bilateral knees. Current Treatment Recommendations: Strengthening, ROM, Balance Training, Functional Mobility Training, Transfer Training, Endurance Training, Gait Training, Stair training, Pain Management, Home Exercise Program, Safety Education & Training, Patient/Caregiver Education & Training, Equipment Evaluation, Education, & procurement  Safety Devices  Type of devices:  All fall risk precautions in place, Call light within reach, Left in bed  Restraints  Initially in place: No     Therapy Time     10/05/21 1012   PT Individual Minutes   Time In 1010   Time Out 1103   Minutes [id number] Jessika Gregory, PTA

## 2021-10-05 NOTE — CARE COORDINATION
Informed patient SW called [facility] and confirmed shower bench would be delivered to room today. Pt stated her son will be picking her up and she has no questions regarding discharge.

## 2021-10-18 ENCOUNTER — TELEPHONE (OUTPATIENT)
Dept: ORTHOPEDIC SURGERY | Age: 61
End: 2021-10-18

## 2021-10-18 NOTE — TELEPHONE ENCOUNTER
Bernarda Gillespie from / [facility] is calling to advise that Foster Guidry has noticed some seeping around her bottom incision, right leg. Appears to be clear, but the site has a little redness, no fever. She is scheduled to be seen on 10/20. Please contact her to discuss. Thank you.

## 2021-10-18 NOTE — TELEPHONE ENCOUNTER
Spoke with patient. She denies any new redness or pain, denies area being warm to touch and has slight, clear drainage. Advised pt it doesn't appear to be an infection and some drainage is normal and will be seen Wednesday at first post op appt.

## 2021-10-20 ENCOUNTER — OFFICE VISIT (OUTPATIENT)
Dept: ORTHOPEDIC SURGERY | Age: 61
End: 2021-10-20

## 2021-10-20 DIAGNOSIS — Z96.653 STATUS POST BILATERAL KNEE REPLACEMENTS: Primary | ICD-10-CM

## 2021-10-20 PROCEDURE — 99024 POSTOP FOLLOW-UP VISIT: CPT | Performed by: ORTHOPAEDIC SURGERY

## 2021-10-20 NOTE — PROGRESS NOTES
Iesha Abel M.D.            [address] [facility] [address] [facility],[address], [facility].           Dept Phone: [phone]           Dept Fax:  [address], [facility]          Dept Phone: [phone]           Dept Fax:  [phone]      Chief Compliant:  Chief Complaint   Patient presents with    Post-Op Check     bilateral TKA 8/24/21        History of Present Illness: This is a 64 y.o. female who presents to the clinic today for evaluation / follow up of status post bilateral total knees x7 weeks. Patient was in rehab postoperatively. Patient states that she had been doing home physical therapy. She still has some aches and pains is making slow progress. .       Review of Systems   Constitutional: Negative for fever, chills, sweats. Eyes: Negative for changes in vision, or pain. HENT: Negative for ear ache, epistaxis, or sore throat. Respiratory/Cardio: Negative for Chest pain, palpitations, SOB, or cough. Gastrointestinal: Negative for abdominal pain, N/V/D. Genitourinary: Negative for dysuria, frequency, urgency, or hematuria. Neurological: Negative for headache, numbness, or weakness. Integumentary: Negative for rash, itching, laceration, or abrasion. Musculoskeletal: Positive for Post-Op Check (bilateral TKA 8/24/21)       Physical Exam:  Constitutional: Patient is oriented to person, place, and time. Patient appears well-developed and well nourished. HENT: Negative otherwise noted  Head: Normocephalic and Atraumatic  Nose: Normal  Eyes: Conjunctivae and EOM are normal  Neck: Normal range of motion Neck supple. Respiratory/Cardio: Effort normal. No respiratory distress. Musculoskeletal: Examination of patient's left knee notes the incision is healed. Steri-Strips intact.   Her knee motion is a little stiff she is 5 to José Barker is a 36 y.o.  female and presents with    Chief Complaint   Patient presents with    Labs     evaluation       Subjective:  Ms. Yoly Gamez presents today for lab work results. Additional Concerns: No         Patient Active Problem List   Diagnosis Code    Dysthymia F34.1    Type 2 diabetes mellitus with hyperglycemia, without long-term current use of insulin ([facility]) E11.65    Dyslipidemia E78.5     Current Outpatient Prescriptions   Medication Sig Dispense Refill    metFORMIN (GLUCOPHAGE) 1,000 mg tablet Take 1 Tab by mouth two (2) times daily (with meals). 60 Tab 2    pravastatin (PRAVACHOL) 20 mg tablet Take 1 Tab by mouth nightly. 30 Tab 2    multivitamin, tx-iron-ca-min (THERA-M W/ IRON) 9 mg iron-400 mcg tab tablet Take 1 Tab by mouth daily.  nystatin-triamcinolone (MYCOLOG) 100,000-0.1 unit/gram-% ointment Apply  to affected area two (2) times a day. 30 g 0    cetirizine (ZYRTEC) 10 mg tablet Take 1 Tab by mouth nightly. Indications: SEASONAL ALLERGIC RHINITIS 30 Tab 1     No Known Allergies  Past Medical History:   Diagnosis Date    Abnormal Papanicolaou smear of cervix      History reviewed. No pertinent surgical history. Family History   Problem Relation Age of Onset   Rama Stack Cancer Mother      Lung Cancer    Cancer Father      unknown cancer    Diabetes Brother 50     Social History   Substance Use Topics    Smoking status: Never Smoker    Smokeless tobacco: Not on file    Alcohol use Yes      Comment: occasionally       ROS   History obtained from the patient  General ROS: negative for - chills or fever    All other systems reviewed and are negative.       Objective:  Vitals:    04/12/17 1332 04/12/17 1337   BP: 146/89 138/86   Pulse: 96    Resp: 19    Temp: 97.3 °F (36.3 °C)    TempSrc: Oral    SpO2: 100%    Weight: 213 lb 12.8 oz (97 kg)    Height: 5' 6\" (1.676 m)    PainSc:   0 - No pain    LMP: 04/07/2017       General appearance - alert, well appearing, and about 95 degrees. No signs of redness there is no active drainage. Examination patient's right knee notes that the very inferior most aspect of her incision she has a small scab. There is no surrounding erythema I am able to get a slight serous drainage from the scab it appears. Coming from the scab itself is nonpurulent in nature. Patient has no warmth or redness to his her knee and her motion is 5 to about 95 degrees. Neurological: Patient is alert and oriented to person, place, and time. Normal strenght. No sensory deficit. Skin: Skin is warm and dry  Psychiatric: Behavior is normal. Thought content normal.  Nursing note and vitals reviewed. Labs and Imaging:     XR taken today:  No results found. No orders of the defined types were placed in this encounter. Assessment and Plan:  1. Status post bilateral knee replacements            This is a 64 y.o. female who presents to the clinic today for evaluation / follow up of status post bilateral total knees.      Past History:    Current Outpatient Medications:     aspirin 81 MG EC tablet, Take 1 tablet by mouth 2 times daily, Disp: 30 tablet, Rfl: 3    aluminum & magnesium hydroxide-simethicone (MAALOX) 200-200-20 MG/5ML SUSP suspension, Take 30 mLs by mouth every 6 hours as needed for Indigestion, Disp: , Rfl: 0    calcium carbonate (TUMS) 500 MG chewable tablet, Take 1 tablet by mouth 3 times daily as needed for Heartburn, Disp: , Rfl:     polyethylene glycol (GLYCOLAX) 17 g packet, Take 17 g by mouth daily, Disp: 527 g, Rfl: 1    amLODIPine (NORVASC) 10 MG tablet, Take 10 mg by mouth daily, Disp: , Rfl:     lisinopril (PRINIVIL;ZESTRIL) 20 MG tablet, Take 20 mg by mouth daily , Disp: , Rfl:   No Known Allergies  Social History     Socioeconomic History    Marital status:      Spouse name: Not on file    Number of children: Not on file    Years of education: Not on file    Highest education level: Not on file   Occupational in no distress  Mental status - normal mood, behavior, speech, dress, motor activity, and thought processes      LABS   Lab Results  Component Value Date/Time   WBC 7.7 04/10/2017 11:00 AM   HGB 13.1 04/10/2017 11:00 AM   HCT 41.8 04/10/2017 11:00 AM   PLATELET 866 58/78/1969 11:00 AM   MCV 76.0 04/10/2017 11:00 AM       Lab Results  Component Value Date/Time   Cholesterol, total 233 04/10/2017 11:00 AM   HDL Cholesterol 58 04/10/2017 11:00 AM   LDL, calculated 113.4 04/10/2017 11:00 AM   Triglyceride 308 04/10/2017 11:00 AM   CHOL/HDL Ratio 4.0 04/10/2017 11:00 AM       Lab Results  Component Value Date/Time   TSH 0.87 04/10/2017 01:25 PM   T4, Free 1.3 04/10/2017 01:25 PM      Lab Results   Component Value Date/Time    Sodium 131 04/10/2017 11:00 AM    Potassium 4.8 04/10/2017 11:00 AM    Chloride 95 04/10/2017 11:00 AM    CO2 24 04/10/2017 11:00 AM    Anion gap 12 04/10/2017 11:00 AM    Glucose 448 04/10/2017 11:00 AM    BUN 13 04/10/2017 11:00 AM    Creatinine 0.99 04/10/2017 11:00 AM    BUN/Creatinine ratio 13 04/10/2017 11:00 AM    GFR est AA >60 04/10/2017 11:00 AM    GFR est non-AA >60 04/10/2017 11:00 AM    Calcium 9.6 04/10/2017 11:00 AM    Bilirubin, total 0.4 04/10/2017 11:00 AM    ALT (SGPT) 32 04/10/2017 11:00 AM    AST (SGOT) 19 04/10/2017 11:00 AM    Alk.  phosphatase 69 04/10/2017 11:00 AM    Protein, total 7.6 04/10/2017 11:00 AM    Albumin 4.0 04/10/2017 11:00 AM    Globulin 3.6 04/10/2017 11:00 AM    A-G Ratio 1.1 04/10/2017 11:00 AM      Lab Results   Component Value Date/Time    Hemoglobin A1c 13.1 04/10/2017 11:00 AM      4/10/2017  9:24 PM - Luis, [facility]   Component Results   Component Value Flag Ref Range Units Status   Vitamin B12 521  211 - 911 pg/mL Final   Folate >20.0 (H) 3.10 - 17.50 ng/mL Final     4/11/2017  2:41 PM - [facility], [facility]   Component Results   Component Value Flag Ref Range Units Status   Calcitriol (Vit D 1, 25 di-OH) 40.6  19.9 - 79.3 pg/mL Final Assessment/Plan:    1. Type 2 Diabetes- newly diagnosed; a1c 13.1; extensive discussion with patient regarding diet and lifestyle change; discussed medication management; will start off with metformin 1000mg BID; may start with 1 tab daily then increase to 2 tabs after 1 week; discussed potential side effects of medication; return in 1 month for medication evaluation; recheck a1c in 3 months    2. Dyslipidemia- discussed lipid results and LDL and TG goals; discussed diet and lifestyle modification; start Pravastatin 20mg every evening; discussed potential side effect of medication; return in 1 month for med eval; recheck fasting lipid panel in 3 months     Lab review: labs reviewed, I note that glycosylated hemoglobin abnormal and needs improvement, lipids LDL result does not yet meet goal, HDL normal, triglycerides high    Today's Visit: Metformin 1000mg, Pravastatin 20mg    Health Maintenance:     Next Visit: in office Blood Sugar, med eval, 24 hour Diet Recall    I have discussed the diagnosis with the patient and the intended plan as seen in the above orders. The patient has received an after-visit summary and questions were answered concerning future plans. I have discussed medication side effects and warnings with the patient as well. I have reviewed the plan of care with the patient, accepted their input and they are in agreement with the treatment goals. Follow-up Disposition:  Return in about 1 month (around 5/12/2017) for medication eval.  More than 1/2 of this 25 minute visit was spent in counseling and coordination of care, as described above.     LRUDES Cisneros History    Not on file   Tobacco Use    Smoking status: Current Every Day Smoker     Packs/day: 1.00    Smokeless tobacco: Never Used   Vaping Use    Vaping Use: Some days    Substances: Nicotine, Flavoring   Substance and Sexual Activity    Alcohol use: Not Currently    Drug use: Not Currently    Sexual activity: Not on file   Other Topics Concern    Not on file   Social History Narrative    Not on file     Social Determinants of Health     Financial Resource Strain:     Difficulty of Paying Living Expenses:    Food Insecurity:     Worried About Running Out of Food in the Last Year:     Ran Out of Food in the Last Year:    Transportation Needs:     Lack of Transportation (Medical):      Lack of Transportation (Non-Medical):    Physical Activity:     Days of Exercise per Week:     Minutes of Exercise per Session:    Stress:     Feeling of Stress :    Social Connections:     Frequency of Communication with Friends and Family:     Frequency of Social Gatherings with Friends and Family:     Attends [employer] Services:     Active Member of Clubs or Organizations:     Attends Club or Organization Meetings:     Marital Status:    Intimate Partner Violence:     Fear of Current or Ex-Partner:     Emotionally Abused:     Physically Abused:     Sexually Abused:      Past Medical History:   Diagnosis Date    Anxiety     Arthritis     Depression     Hypertension     Knee pain     x10 years    Murmur     PONV (postoperative nausea and vomiting)     Sleep apnea     no machine     Past Surgical History:   Procedure Laterality Date    EYE SURGERY Left 2018    had a tear in left eye- had laser surgery to repair    KNEE SURGERY Bilateral 9/21/2021    KNEE TOTAL ARTHROPLASTY BILATERAL performed by Dev Noe MD at [address] [place] [address] Left     SKIN BIOPSY      negative    TUMOR REMOVAL      left foot    ULNAR TUNNEL RELEASE Right     pinched nerve- has a plate and 7 screws Family History   Problem Relation Age of Onset   Fatimah Addison Pacemaker Mother     Hypertension Father     Diabetes Father    Plan  The patient was instructed that she is a little bit behind her motion and I would like to get her transition from home therapy to outpatient therapy ASAP in the meantime however she has a small scabbed area and there is just 1 or 2 drops of nonpurulent fluid it appears to be scab leakage rather than then purulence. I do not see any obvious signs of infection whatsoever in regards to this. It is limited to an area that is small the size of a dime. Patient will be initiated into a wound care area for I think just a little local debridement and wound care will be necessary and this will be set up at [facility].  Patient also be given a prescription for outpatient therapy but I would like to see her back here the next 2 weeks just to keep an eye on this wound. Provider Attestation:  Lucy Noriega, personally performed the services described in this documentation. All medical record entries made by the scribe were at my direction and in my presence. I have reviewed the chart and discharge instructions and agree that the records reflect my personal performance and is accurate and complete. Benjamin Barthel, MD. 10/20/21      Please note that this chart was generated using voice recognition Dragon dictation software. Although every effort was made to ensure the accuracy of this automated transcription, some errors in transcription may have occurred.

## 2021-10-26 ENCOUNTER — TELEPHONE (OUTPATIENT)
Dept: ORTHOPEDIC SURGERY | Age: 61
End: 2021-10-26

## 2021-10-26 NOTE — TELEPHONE ENCOUNTER
Rocio from [address] is needing a Precert for [employer] [facility]. She states she tried taking care of this directly with her insurance, however, the request needs to come from the provider. Alena's scheduled evaluation is on 11/2. [facility] has provided the following:    NPI: [id number]  Procedure Codes: 75837, 66879, 79174     Any questions or concerns please contact her. Phone - [phone]  Fax - [phone]    Thank you.

## 2021-10-28 NOTE — TELEPHONE ENCOUNTER
LVM stating that I need a number of visit and duration if possible            Starting prior authorization

## 2021-10-29 NOTE — TELEPHONE ENCOUNTER
Did you start this prior authorization? These are Evaluation codes, they are only 1 time visit (we have to get all three approved and then when the visit is completed they decide which level to bill the insurance)    This is done on the [facility] website.

## 2021-11-01 NOTE — TELEPHONE ENCOUNTER
NAKUL barrera from [facility]  notifying that prior authorization for Physical therapy evaluation was approved confirmation # [id number]  ALSO christinem for pt as well

## 2021-11-03 ENCOUNTER — OFFICE VISIT (OUTPATIENT)
Dept: ORTHOPEDIC SURGERY | Age: 61
End: 2021-11-03

## 2021-11-03 DIAGNOSIS — Z96.653 STATUS POST BILATERAL KNEE REPLACEMENTS: Primary | ICD-10-CM

## 2021-11-03 PROCEDURE — 99024 POSTOP FOLLOW-UP VISIT: CPT | Performed by: ORTHOPAEDIC SURGERY

## 2021-11-03 RX ORDER — CEFDINIR 300 MG/1
300 CAPSULE ORAL 2 TIMES DAILY
Qty: 20 CAPSULE | Refills: 0 | Status: SHIPPED | OUTPATIENT
Start: 2021-11-03 | End: 2021-11-13

## 2021-11-03 NOTE — PROGRESS NOTES
Alberto Muller returns today to assess her knees. She is status post bilateral total knees x2 months patient states that she has no complaints with her left knee at all. She is on her right side still feels a little bit of tightness. She has been going to wound care the last couple weeks she has a small area on the inferior part that is not nonhealing. Does not appear to be gross infection. She states that the wound care says that it seems to be healing up but still requiring a special care. She denies any active drainage denies any fever or chills. Examination patient's right knee is notes that she has about a centimeter length opening on the inferior part of the wound. She has no obvious purulence. She has minimal redness to the area. She has basically no pain when I motion her knee 0 to 90 degrees she has little bit of pain after that no obvious effusions present no warmth or heat associated with it as well. No new x-rays taken today    Plan  Patient is encouraged to continue with wound care. This wound seems to be finally starting to close down but still requires active maintenance. No obvious signs of infectious process at this time. Patient is beginning outpatient therapy.   I would like to see the patient back in 2 weeks time for reevaluation

## 2021-11-17 ENCOUNTER — OFFICE VISIT (OUTPATIENT)
Dept: ORTHOPEDIC SURGERY | Age: 61
End: 2021-11-17

## 2021-11-17 DIAGNOSIS — Z96.653 STATUS POST BILATERAL KNEE REPLACEMENTS: Primary | ICD-10-CM

## 2021-11-17 PROCEDURE — 99024 POSTOP FOLLOW-UP VISIT: CPT | Performed by: ORTHOPAEDIC SURGERY

## 2021-11-17 RX ORDER — CEFDINIR 300 MG/1
300 CAPSULE ORAL 2 TIMES DAILY
Qty: 28 CAPSULE | Refills: 0 | Status: SHIPPED | OUTPATIENT
Start: 2021-11-17 | End: 2021-12-01

## 2021-11-17 NOTE — PROGRESS NOTES
Anuja Clifford returns today she is here to check her right knee she is status post bilateral total knees 8/24/2021. She has a very small wound at the inferior aspect of her incision that she has been going to wound care. She was started empirically on Omnicef she denies any fever or chills. She states her pain is only modest at nighttime. She still having difficulty getting established with physical therapy. This is very frustrating. Examination notes that her left knee is unremarkable patient's right knee wound looks significantly improved. She has about a 1/2 inch area of open clean wound there is no purulence or or drainage whatsoever. She has a nice beefy red granulation tissue on either side. There is no surrounding redness nothing can be expressed she has relatively pain-free motion. Impression  Significantly improved small delayed wound healing without signs of infection right knee as well as previous left total knee    Plan  Patient to continue with her wound care this is coming along very much improved. I think empirically given her smoking history having remain on Omnicef is prudent. Continue the wound care. I really stressed the importance of her getting in the back in the therapy.   We will see her back for recheck in 2 weeks

## 2021-12-01 ENCOUNTER — OFFICE VISIT (OUTPATIENT)
Dept: ORTHOPEDIC SURGERY | Age: 61
End: 2021-12-01

## 2021-12-01 DIAGNOSIS — Z96.653 STATUS POST BILATERAL KNEE REPLACEMENTS: Primary | ICD-10-CM

## 2021-12-01 PROCEDURE — 99024 POSTOP FOLLOW-UP VISIT: CPT | Performed by: ORTHOPAEDIC SURGERY

## 2021-12-01 RX ORDER — CEFDINIR 300 MG/1
300 CAPSULE ORAL 2 TIMES DAILY
Qty: 28 CAPSULE | Refills: 0 | Status: SHIPPED | OUTPATIENT
Start: 2021-12-01 | End: 2021-12-15

## 2021-12-01 NOTE — PROGRESS NOTES
Toñito Carrero comes in today she she is here status post bilateral total knees in 8/24/2021. Her left knee has been no issues she has a tiny small wound at the very inferior aspect of her right knee that has had difficulty healing. There is been no gross evidence for infection and she her knee has remained completely asymptomatic on the side other than delaying her physical therapy and range of motion. She has been doing wound care as well as antibiotics. She is here for 2-week check on the right side she states that the wound care says that she is almost completely healed    Examination of her right knee notes that I can motion her knee 0 to about 110 degrees which is a big improvement for the patient she is essentially pain-free with this. She has a small eraser sized area that is nearly closed off at the very inferior most aspect of her right knee. There is no drainage I can see the bandages that just has light serous drainage to it. There is no warmth or redness to the knee at all. Examination of her left knee is completely benign motion 0 to 115degrees. Impression  Status post bilateral total knees    Plan  Patient is a wound is essentially healed off. She is good to continue until she has completely closed with the wound care and the SAMANTHA SLOAN. Also encouraged continue physical therapy she is making slow steady progress with her therapy.   We will see her back in 2 weeks for repeat wound check

## 2021-12-15 ENCOUNTER — OFFICE VISIT (OUTPATIENT)
Dept: ORTHOPEDIC SURGERY | Age: 61
End: 2021-12-15

## 2021-12-15 DIAGNOSIS — Z96.653 STATUS POST BILATERAL KNEE REPLACEMENTS: Primary | ICD-10-CM

## 2021-12-15 PROCEDURE — 99024 POSTOP FOLLOW-UP VISIT: CPT | Performed by: ORTHOPAEDIC SURGERY

## 2021-12-15 NOTE — PROGRESS NOTES
Returns today. She status post bilateral total knees on 8/24/2021. Milka Wetzel has been followed by me for some time for her right knee. She has had a small tiny little wound in the inferior aspect of her right knee. This has been very much of a struggle to get healed completely. She has been doing a wound care down in [place] and is also been doing physical therapy. She denies fever chills states he only really has major discomfort at nighttime when she is trying to find a comfortable space/position to sleep. Examination notes that the patient has no redness or warmth to her knee. She at the very most inferior aspect she has a smaller than a pencil eraser sized wound. There is just scant drainage to this area no surrounding redness I cannot express anything she is pretty much pain-free with motion of the knee joint she cannot stand direct pressure however along the kneecap area no effusion is present. Patient's left knee is unremarkable    Impression  Status post bilateral total knees  Small persistent superficial wound delayed healing without any evidence for infectious process    Plan  Patient continue the wound care. It is essentially at the point of almost closing off. My index of suspicion for infection to the joint is very low at this point but if persistent we may need to consider infectious work-up with a sed rate and/or aspiration but again I do not feel that this is seems to be the case.   We will see the patient back here in 3 weeks for reevaluation

## 2022-01-05 ENCOUNTER — OFFICE VISIT (OUTPATIENT)
Dept: ORTHOPEDIC SURGERY | Age: 62
End: 2022-01-05
Payer: MEDICAID

## 2022-01-05 DIAGNOSIS — Z96.653 STATUS POST BILATERAL KNEE REPLACEMENTS: Primary | ICD-10-CM

## 2022-01-05 PROCEDURE — G8417 CALC BMI ABV UP PARAM F/U: HCPCS | Performed by: ORTHOPAEDIC SURGERY

## 2022-01-05 PROCEDURE — G8484 FLU IMMUNIZE NO ADMIN: HCPCS | Performed by: ORTHOPAEDIC SURGERY

## 2022-01-05 PROCEDURE — 4004F PT TOBACCO SCREEN RCVD TLK: CPT | Performed by: ORTHOPAEDIC SURGERY

## 2022-01-05 PROCEDURE — 99212 OFFICE O/P EST SF 10 MIN: CPT | Performed by: ORTHOPAEDIC SURGERY

## 2022-01-05 PROCEDURE — 3017F COLORECTAL CA SCREEN DOC REV: CPT | Performed by: ORTHOPAEDIC SURGERY

## 2022-01-05 PROCEDURE — G8428 CUR MEDS NOT DOCUMENT: HCPCS | Performed by: ORTHOPAEDIC SURGERY

## 2022-01-05 NOTE — PROGRESS NOTES
Guillermina Pepe returns today. She status post bilateral total knees 8/24/2021. Guillermina Pepe states that overall her knees are doing very well except on her right knee at nighttime she has discomfort with a stabbing pain trying to get a good position. She states that when she is up and ambulating her pain is 100% better than it was prior to surgery. Marya Avilez has been dealing with a small punctate wound in the inferior aspect of her right knee that she was going to wound care. He states that wound care is basically told her that she does not need any antibiotics as he is just doing an antibiotic swab. She denies any fever chills denies any redness she denies any drainage    Examination of the patient's right knee notes that the inferior 1/2 cm basically more less a blister that is nondraining. There is no opening whatsoever looks more purely just like an abrasion at this point. It is trying to epithelialize. No redness surrounding redness or cellulitis is noted. She has pain-free motion of the knee. No new x-rays today today    Impression  Status post bilateral total knees  Delayed wound healing without evidence for infection right knee almost the point of complete resolution    Plan  Patient continue antibiotic swab. She does wound care every 2 weeks down in [place]. When this is healed over she may begin physical therapy.   We will see her back here in 6 weeks

## 2022-02-09 ENCOUNTER — OFFICE VISIT (OUTPATIENT)
Dept: ORTHOPEDIC SURGERY | Age: 62
End: 2022-02-09
Payer: MEDICAID

## 2022-02-09 VITALS — BODY MASS INDEX: 46.38 KG/M2 | HEIGHT: 62 IN | WEIGHT: 252 LBS

## 2022-02-09 DIAGNOSIS — Z96.653 STATUS POST BILATERAL KNEE REPLACEMENTS: Primary | ICD-10-CM

## 2022-02-09 PROCEDURE — G8417 CALC BMI ABV UP PARAM F/U: HCPCS | Performed by: ORTHOPAEDIC SURGERY

## 2022-02-09 PROCEDURE — 99213 OFFICE O/P EST LOW 20 MIN: CPT | Performed by: ORTHOPAEDIC SURGERY

## 2022-02-09 PROCEDURE — G8484 FLU IMMUNIZE NO ADMIN: HCPCS | Performed by: ORTHOPAEDIC SURGERY

## 2022-02-09 PROCEDURE — 4004F PT TOBACCO SCREEN RCVD TLK: CPT | Performed by: ORTHOPAEDIC SURGERY

## 2022-02-09 PROCEDURE — 3017F COLORECTAL CA SCREEN DOC REV: CPT | Performed by: ORTHOPAEDIC SURGERY

## 2022-02-09 PROCEDURE — G8427 DOCREV CUR MEDS BY ELIG CLIN: HCPCS | Performed by: ORTHOPAEDIC SURGERY

## 2022-02-09 NOTE — PROGRESS NOTES
Conjunctivae and EOM are normal  Neck: Normal range of motion Neck supple. Respiratory/Cardio: Effort normal. No respiratory distress. Musculoskeletal: Lamination notes that the patient has complete wound healing with complete epithelialization over the inferior aspect where she had his chronic open wound. She has good motion of the knee zero to about 115 degrees which is doing very well. Neurological: Patient is alert and oriented to person, place, and time. Normal strenght. No sensory deficit. Skin: Skin is warm and dry  Psychiatric: Behavior is normal. Thought content normal.  Nursing note and vitals reviewed. Labs and Imaging:     XR taken today:  No results found. No orders of the defined types were placed in this encounter. 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 female who presents to the clinic today for evaluation / follow up of status post bilateral total knees.      Past History:    Current Outpatient Medications:     aspirin 81 MG EC tablet, Take 1 tablet by mouth 2 times daily, Disp: 30 tablet, Rfl: 3    aluminum & magnesium hydroxide-simethicone (MAALOX) 200-200-20 MG/5ML SUSP suspension, Take 30 mLs by mouth every 6 hours as needed for Indigestion, Disp: , Rfl: 0    amLODIPine (NORVASC) 10 MG tablet, Take 10 mg by mouth daily, Disp: , Rfl:     lisinopril (PRINIVIL;ZESTRIL) 20 MG tablet, Take 20 mg by mouth daily , Disp: , Rfl:   No Known Allergies  Social History     Socioeconomic History    Marital status:      Spouse name: Not on file    Number of children: Not on file    Years of education: Not on file    Highest education level: Not on file   Occupational History    Not on file   Tobacco Use    Smoking status: Current Every Day Smoker     Packs/day: 1.00    Smokeless tobacco: Never Used   Vaping Use    Vaping Use: Some days    Substances: Nicotine, Flavoring   Substance and Sexual Activity    Alcohol use: Not Currently    Drug use: Not Currently    Sexual activity: Not on file   Other Topics Concern    Not on file   Social History Narrative    Not on file     Social Determinants of Health     Financial Resource Strain:     Difficulty of Paying Living Expenses: Not on file   Food Insecurity:     Worried About Running Out of Food in the Last Year: Not on file    Clari of Food in the Last Year: Not on file   Transportation Needs:     Lack of Transportation (Medical): Not on file    Lack of Transportation (Non-Medical):  Not on file   Physical Activity:     Days of Exercise per Week: Not on file    Minutes of Exercise per Session: Not on file   Stress:     Feeling of Stress : Not on file   Social Connections:     Frequency of Communication with Friends and Family: Not on file    Frequency of Social Gatherings with Friends and Family: Not on file    Attends Latter-day Services: Not on file    Active Member of [address] [place] [address] or Organizations: Not on file    Attends Club or Organization Meetings: Not on file    Marital Status: Not on file   Intimate Partner Violence:     Fear of Current or Ex-Partner: Not on file    Emotionally Abused: Not on file    Physically Abused: Not on file    Sexually Abused: Not on file   Housing Stability:     Unable to Pay for Housing in the Last Year: Not on file    Number of [place] in the Last Year: Not on file    Unstable Housing in the Last Year: Not on file     Past Medical History:   Diagnosis Date    Anxiety     Arthritis     Depression     Hypertension     Knee pain     x10 years    Murmur     PONV (postoperative nausea and vomiting)     Sleep apnea     no machine     Past Surgical History:   Procedure Laterality Date    EYE SURGERY Left 2018    had a tear in left eye- had laser surgery to repair    KNEE SURGERY Bilateral 9/21/2021    KNEE TOTAL ARTHROPLASTY BILATERAL performed by Natalya Beauchamp MD at [address] [place] [address] Left     SKIN BIOPSY      negative    TUMOR REMOVAL      left foot    ULNAR TUNNEL RELEASE Right     pinched nerve- has a plate and 7 screws      Family History   Problem Relation Age of Onset   Ranjana Barahona Pacemaker Mother     Hypertension Father     Diabetes Father    Plan  Patient is finally healed this wound. She is overall functioning well. She is no longer using any assist devices and is in the pool which she says helped out a lot. We will see her back here for first year anniversary call if she has any problems prior to that time      Provider Attestation:  Angelo Cordova, personally performed the services described in this documentation. All medical record entries made by the scribe were at my direction and in my presence. I have reviewed the chart and discharge instructions and agree that the records reflect my personal performance and is accurate and complete. Natalya Beauchamp MD. 02/09/22      Please note that this chart was generated using voice recognition Dragon dictation software. Although every effort was made to ensure the accuracy of this automated transcription, some errors in transcription may have occurred.

## 2022-09-07 ENCOUNTER — OFFICE VISIT (OUTPATIENT)
Dept: ORTHOPEDIC SURGERY | Age: 62
End: 2022-09-07
Payer: MEDICAID

## 2022-09-07 DIAGNOSIS — M25.561 PAIN IN BOTH KNEES, UNSPECIFIED CHRONICITY: Primary | ICD-10-CM

## 2022-09-07 DIAGNOSIS — M25.562 PAIN IN BOTH KNEES, UNSPECIFIED CHRONICITY: Primary | ICD-10-CM

## 2022-09-07 PROCEDURE — 99213 OFFICE O/P EST LOW 20 MIN: CPT | Performed by: ORTHOPAEDIC SURGERY

## 2022-09-07 PROCEDURE — G8417 CALC BMI ABV UP PARAM F/U: HCPCS | Performed by: ORTHOPAEDIC SURGERY

## 2022-09-07 PROCEDURE — 4004F PT TOBACCO SCREEN RCVD TLK: CPT | Performed by: ORTHOPAEDIC SURGERY

## 2022-09-07 PROCEDURE — G8428 CUR MEDS NOT DOCUMENT: HCPCS | Performed by: ORTHOPAEDIC SURGERY

## 2022-09-07 PROCEDURE — 3017F COLORECTAL CA SCREEN DOC REV: CPT | Performed by: ORTHOPAEDIC SURGERY

## 2022-09-07 NOTE — PROGRESS NOTES
Doris Dias M.D.            [facility] [address], [address] [facility]           Dept Phone: [phone]           Dept Fax:  [phone]           Judy Haile          Dept Phone: [phone]           Dept Fax:  [phone]      Chief Compliant:  Chief Complaint   Patient presents with    Pain     Tj TKA 8/24/21        History of Present Illness: This is a 58 y.o. female who presents to the clinic today for evaluation / follow up of that is post bilateral total knees x1 year. None overall says her knees feel fine but she states that her right side feels a little stiffer than the left but overall she is very pleased with the results. .       Review of Systems   Constitutional: Negative for fever, chills, sweats. Eyes: Negative for changes in vision, or pain. HENT: Negative for ear ache, epistaxis, or sore throat. Respiratory/Cardio: Negative for Chest pain, palpitations, SOB, or cough. Gastrointestinal: Negative for abdominal pain, N/V/D. Genitourinary: Negative for dysuria, frequency, urgency, or hematuria. Neurological: Negative for headache, numbness, or weakness. Integumentary: Negative for rash, itching, laceration, or abrasion. Musculoskeletal: Positive for Pain (Tj TKA 8/24/21)       Physical Exam:  Constitutional: Patient is oriented to person, place, and time. Patient appears well-developed and well nourished. HENT: Negative otherwise noted  Head: Normocephalic and Atraumatic  Nose: Normal  Eyes: Conjunctivae and EOM are normal  Neck: Normal range of motion Neck supple. Respiratory/Cardio: Effort normal. No respiratory distress.   Musculoskeletal: Examination the patient's left knee notes her knee motion is 0 at about 125 degrees she has good varus and valgus stability throughout good patellar tracking    Examination the right knee notes approximately same motion 0 to 120 degrees varus valgus stability also is adequate as with his patellar tracking. Neurological: Patient is alert and oriented to person, place, and time. Normal strength. No sensory deficit. Skin: Skin is warm and dry  Psychiatric: Behavior is normal. Thought content normal.  Nursing note and vitals reviewed. Labs and Imaging:     XR taken today:  XR KNEE LEFT (1-2 VIEWS)    Result Date: 9/7/2022  X-rays taken today reviewed by me show standing AP lateral views the patient's left knee. Patient is status post left total knee arthroplasty components. .  Excellent alignment both AP lateral views with appropriate patellar height. No acute process is noted    XR KNEE RIGHT (1-2 VIEWS)    Result Date: 9/7/2022  X-rays taken a reviewed by me show standing AP lateral views the patient's right knee. Patient is status post right total knee arthroplasty components are in acceptable alignment of both AP and lateral views. Patellar height is appropriate no acute process is noted          Orders Placed This Encounter   Procedures    XR KNEE RIGHT (1-2 VIEWS)     Standing Status:   Future     Number of Occurrences:   1     Standing Expiration Date:   9/2/2023    XR KNEE LEFT (1-2 VIEWS)     Standing Status:   Future     Number of Occurrences:   1     Standing Expiration Date:   9/2/2023       Assessment and Plan:  Status post bilateral total knees 8/24/2021        This is a 58 y.o. female who presents to the clinic today for evaluation / follow up of status post bilateral total knees 8/24/2021.      Past History:    Current Outpatient Medications:     aspirin 81 MG EC tablet, Take 1 tablet by mouth 2 times daily, Disp: 30 tablet, Rfl: 3    aluminum & magnesium hydroxide-simethicone (MAALOX) 200-200-20 MG/5ML SUSP suspension, Take 30 mLs by mouth every 6 hours as needed for Indigestion, Disp: , Rfl: 0    amLODIPine (NORVASC) 10 MG tablet, Take 10 mg by mouth daily, Disp: , Rfl:     lisinopril (PRINIVIL;ZESTRIL) 20 MG tablet, Take 20 mg by mouth daily , Disp: , Rfl:   No Known Allergies  Social History     Socioeconomic History    Marital status:      Spouse name: Not on file    Number of children: Not on file    Years of education: Not on file    Highest education level: Not on file   Occupational History    Not on file   Tobacco Use    Smoking status: Every Day     Packs/day: 1.00     Types: Cigarettes    Smokeless tobacco: Never   Vaping Use    Vaping Use: Some days    Substances: Nicotine, Flavoring   Substance and Sexual Activity    Alcohol use: Not Currently    Drug use: Not Currently    Sexual activity: Not on file   Other Topics Concern    Not on file   Social History Narrative    Not on file     Social Determinants of Health     Financial Resource Strain: Not on file   Food Insecurity: Not on file   Transportation Needs: Not on file   Physical Activity: Not on file   Stress: Not on file   Social Connections: Not on file   Intimate Partner Violence: Not on file   Housing Stability: Not on file     Past Medical History:   Diagnosis Date    Anxiety     Arthritis     Depression     Hypertension     Knee pain     x10 years    Murmur     PONV (postoperative nausea and vomiting)     Sleep apnea     no machine     Past Surgical History:   Procedure Laterality Date    EYE SURGERY Left 2018    had a tear in left eye- had laser surgery to repair    KNEE SURGERY Bilateral 9/21/2021    KNEE TOTAL ARTHROPLASTY BILATERAL performed by Dora Mtz MD at [address] [place] [address],[address] Left     SKIN BIOPSY      negative    TUMOR REMOVAL      left foot    ULNAR TUNNEL RELEASE Right     pinched nerve- has a plate and 7 screws      Family History   Problem Relation Age of Onset    Pacemaker Mother     Hypertension Father     Diabetes Father    Plan  Patient overall is very pleased. Encouraged her to continue exercise program as well as weight loss.   We will see her back here in 2 years or call any problems prior to that time      Provider Attestation:  aSthya Dunbar, personally performed the services described in this documentation. All medical record entries made by the scribe were at my direction and in my presence. I have reviewed the chart and discharge instructions and agree that the records reflect my personal performance and is accurate and complete. Edda Camacho MD. 09/07/22      Please note that this chart was generated using voice recognition Dragon dictation software. Although every effort was made to ensure the accuracy of this automated transcription, some errors in transcription may have occurred.

## 2023-05-03 ENCOUNTER — OFFICE VISIT (OUTPATIENT)
Dept: ORTHOPEDIC SURGERY | Age: 63
End: 2023-05-03

## 2023-05-03 DIAGNOSIS — Z96.653 STATUS POST BILATERAL KNEE REPLACEMENTS: Primary | ICD-10-CM

## 2023-05-03 NOTE — PROGRESS NOTES
Edu Lucero M.D.            [facility] [address] [facility],[address], [address] [facility]           Dept Phone: [phone]           Dept Fax:  [phone]           Judy Haile          Dept Phone: [phone]           Dept Fax:  [phone]      Chief Compliant:  Chief Complaint   Patient presents with    Pain     H/o B/L TKA 8/24/21        History of Present Illness: This is a 58 y.o. female who presents to the clinic today for evaluation / follow up of post bilateral total knees done in August 2021. Patient overall has been doing very well however she does note that she was on her knees the other day looking for something underneath the bed and and after she got up she had significant swelling and discoloration around her right knee. She denies twisting or turning it. She states that it has gotten a lot better since then but she wanted to make sure everything was okay. Review of Systems   Constitutional: Negative for fever, chills, sweats. Eyes: Negative for changes in vision, or pain. HENT: Negative for ear ache, epistaxis, or sore throat. Respiratory/Cardio: Negative for Chest pain, palpitations, SOB, or cough. Gastrointestinal: Negative for abdominal pain, N/V/D. Genitourinary: Negative for dysuria, frequency, urgency, or hematuria. Neurological: Negative for headache, numbness, or weakness. Integumentary: Negative for rash, itching, laceration, or abrasion. Musculoskeletal: Positive for Pain (H/o B/L TKA 8/24/21)       Physical Exam:  Constitutional: Patient is oriented to person, place, and time. Patient appears well-developed and well nourished. HENT: Negative otherwise noted  Head: Normocephalic and Atraumatic  Nose: Normal  Eyes: Conjunctivae and EOM are normal  Neck: Normal range of motion Neck supple.

## (undated) DEVICE — 450 ML BOTTLE OF 0.05% CHLORHEXIDINE GLUCONATE IN 99.95% STERILE WATER FOR IRRIGATION, USP AND APPLICATOR.: Brand: IRRISEPT ANTIMICROBIAL WOUND LAVAGE

## (undated) DEVICE — DRESSING ALGINATE POST OPERATIVE 12X3.5 IN RECT PRIMASEAL

## (undated) DEVICE — SUTURE VCRL SZ 0 L36IN ABSRB UD CT-1 L36MM 1/2 CIR TAPR PNT VCP946H

## (undated) DEVICE — SYRINGE, LUER LOCK, 60ML: Brand: MEDLINE

## (undated) DEVICE — BNDG,ELSTC,MATRIX,STRL,6"X5YD,LF,HOOK&LP: Brand: MEDLINE

## (undated) DEVICE — YANKAUER,SMOOTH HANDLE,HIGH CAPACITY: Brand: MEDLINE INDUSTRIES, INC.

## (undated) DEVICE — DRAPE,U/ SHT,SPLIT,PLAS,STERIL: Brand: MEDLINE

## (undated) DEVICE — LABEL MED DRUG DESCRIPTION MP STL

## (undated) DEVICE — CLOSURE SKIN FLX NONINVASIVE PRELOC TECHNOLOGY FOR 24IN

## (undated) DEVICE — BLANKET WRM W29.9XL79.1IN UP BODY FORC AIR MISTRAL-AIR

## (undated) DEVICE — COOLER THER 20-31IN L CRYO COMB W/ PD KNEE TB GRAV FLO

## (undated) DEVICE — DUAL CUT SAGITTAL BLADE

## (undated) DEVICE — MARKER,SKIN,WI/RULER AND LABELS: Brand: MEDLINE

## (undated) DEVICE — INTENDED FOR TISSUE SEPARATION, AND OTHER PROCEDURES THAT REQUIRE A SHARP SURGICAL BLADE TO PUNCTURE OR CUT.: Brand: BARD-PARKER ® CARBON RIB-BACK BLADES

## (undated) DEVICE — MERCY HEALTH ST CHARLES: Brand: MEDLINE INDUSTRIES, INC.

## (undated) DEVICE — APPLICATOR MEDICATED 26 CC SOLUTION HI LT ORNG CHLORAPREP

## (undated) DEVICE — SUTURE STRATAFIX SYMMETRIC PDS + SZ 1 L18IN ABSRB VLT L48MM SXPP1A400

## (undated) DEVICE — GLOVE ORTHO 8   MSG9480

## (undated) DEVICE — KIT AUTOTRNS APPL AERO 2 SET SYR 2 TIP FOR PLT SEP SYS GPS

## (undated) DEVICE — INTENDED TO SUPPORT AND MAINTAIN THE POSITION OF AN ANESTHETIZED PATIENT DURING SURGERY: Brand: HERMANTOR XL PINK KNEE POSITIONING PAD

## (undated) DEVICE — 4-PORT MANIFOLD: Brand: NEPTUNE 2

## (undated) DEVICE — PAD,ABDOMINAL,8"X7.5",ST,LF,20/BX: Brand: MEDLINE INDUSTRIES, INC.

## (undated) DEVICE — BANDAGE COMPR M W6INXL10YD WHT BGE VELC E MTRX HK AND LOOP

## (undated) DEVICE — NEEDLE SPNL 18GA L3.5IN W/ QNCKE SHARPER BVL DURA CLICK

## (undated) DEVICE — 3M™ IOBAN™ 2 ANTIMICROBIAL INCISE DRAPE 6650EZ: Brand: IOBAN™ 2

## (undated) DEVICE — SOLUTION IRRIG 1000ML STRL H2O USP PLAS POUR BTL

## (undated) DEVICE — ELECTRODE ES L3IN S STL BLDE INSUL DISP VALLEYLAB EDGE

## (undated) DEVICE — BANDAGE,ELASTIC,ESMARK,STERILE,6"X9',LF: Brand: MEDLINE

## (undated) DEVICE — KIT SEP W/ BLD DRAW TB SYR NDL TRNQT PD

## (undated) DEVICE — BANDAGE COBAN 4 IN COMPR W4INXL5YD FOAM COHESIVE QUIK STK SELF ADH SFT

## (undated) DEVICE — SUTURE STRATAFIX SPRL MCRYL + SZ 2 0 L27IN ABSRB UD W NDL SXMP1B419

## (undated) DEVICE — DRAPE, EXTREMITY, BILATERAL, STERILE: Brand: MEDLINE

## (undated) DEVICE — CEMENT MIXING SYSTEM WITH FEMORAL BREAKWAY NOZZLE: Brand: REVOLUTION

## (undated) DEVICE — DRESSING PETRO W3XL8IN OIL EMUL N ADH GZ KNIT IMPREG CELOS

## (undated) DEVICE — 3M™ STERI-DRAPE™ U-DRAPE 1015: Brand: STERI-DRAPE™

## (undated) DEVICE — SPONGE LAP W18XL18IN WHT COT 4 PLY FLD STRUNG RADPQ DISP ST

## (undated) DEVICE — DRAPE,REIN 53X77,STERILE: Brand: MEDLINE

## (undated) DEVICE — HIGH FLOW TIP

## (undated) DEVICE — DRAPE,T,LIMB,BILATERAL,STERILE: Brand: MEDLINE

## (undated) DEVICE — 2108 SERIES SAGITTAL BLADE FLARED, GROUND  (29.0 X 1.32 X 84.0MM)

## (undated) DEVICE — Device

## (undated) DEVICE — STOCKINETTE,IMPERVIOUS,12X48,STERILE: Brand: MEDLINE

## (undated) DEVICE — GLOVE ORANGE PI 8 1/2   MSG9085

## (undated) DEVICE — PADDING CAST W6INXL4YD COT LO LINTING WYTEX

## (undated) DEVICE — MASTISOL ADHESIVE LIQ 2/3ML

## (undated) DEVICE — SOLUTION IRRIG 1000ML 0.9% SOD CHL USP POUR PLAS BTL

## (undated) DEVICE — SOLUTION IRRIG 3000ML 0.9% SOD CHL USP UROMATIC PLAS CONT